# Patient Record
Sex: MALE | Race: BLACK OR AFRICAN AMERICAN
[De-identification: names, ages, dates, MRNs, and addresses within clinical notes are randomized per-mention and may not be internally consistent; named-entity substitution may affect disease eponyms.]

---

## 2017-01-12 ENCOUNTER — HOSPITAL ENCOUNTER (INPATIENT)
Dept: HOSPITAL 61 - ER | Age: 62
LOS: 2 days | Discharge: HOME | DRG: 392 | End: 2017-01-14
Attending: INTERNAL MEDICINE | Admitting: INTERNAL MEDICINE
Payer: MEDICARE

## 2017-01-12 VITALS — WEIGHT: 177 LBS | HEIGHT: 67 IN | BODY MASS INDEX: 27.78 KG/M2

## 2017-01-12 VITALS — DIASTOLIC BLOOD PRESSURE: 73 MMHG | SYSTOLIC BLOOD PRESSURE: 134 MMHG

## 2017-01-12 VITALS — SYSTOLIC BLOOD PRESSURE: 151 MMHG | DIASTOLIC BLOOD PRESSURE: 79 MMHG

## 2017-01-12 VITALS — SYSTOLIC BLOOD PRESSURE: 140 MMHG | DIASTOLIC BLOOD PRESSURE: 89 MMHG

## 2017-01-12 DIAGNOSIS — M87.9: ICD-10-CM

## 2017-01-12 DIAGNOSIS — M16.10: ICD-10-CM

## 2017-01-12 DIAGNOSIS — R07.89: ICD-10-CM

## 2017-01-12 DIAGNOSIS — E66.3: ICD-10-CM

## 2017-01-12 DIAGNOSIS — R10.30: Primary | ICD-10-CM

## 2017-01-12 DIAGNOSIS — N43.3: ICD-10-CM

## 2017-01-12 DIAGNOSIS — Z82.49: ICD-10-CM

## 2017-01-12 DIAGNOSIS — Z84.1: ICD-10-CM

## 2017-01-12 DIAGNOSIS — F12.90: ICD-10-CM

## 2017-01-12 DIAGNOSIS — G89.29: ICD-10-CM

## 2017-01-12 DIAGNOSIS — Z98.890: ICD-10-CM

## 2017-01-12 DIAGNOSIS — F17.210: ICD-10-CM

## 2017-01-12 LAB
ALBUMIN SERPL-MCNC: 3.3 G/DL (ref 3.4–5)
ALBUMIN/GLOB SERPL: 0.9 {RATIO} (ref 1–1.7)
ALP SERPL-CCNC: 76 U/L (ref 46–116)
ALT SERPL-CCNC: 18 U/L (ref 16–63)
ANION GAP SERPL CALC-SCNC: 8 MMOL/L (ref 6–14)
AST SERPL-CCNC: 12 U/L (ref 15–37)
BASOPHILS # BLD AUTO: 0 X10^3/UL (ref 0–0.2)
BASOPHILS NFR BLD: 0 % (ref 0–3)
BILIRUB SERPL-MCNC: 0.3 MG/DL (ref 0.2–1)
BUN SERPL-MCNC: 6 MG/DL (ref 8–26)
BUN/CREAT SERPL: 8 (ref 6–20)
CALCIUM SERPL-MCNC: 8.7 MG/DL (ref 8.5–10.1)
CHLORIDE SERPL-SCNC: 108 MMOL/L (ref 98–107)
CK SERPL-CCNC: 148 U/L (ref 39–308)
CKMB INDEX: 0.5 % (ref 0–4)
CKMB MASS: 0.8 NG/ML (ref 0–3.6)
CO2 SERPL-SCNC: 26 MMOL/L (ref 21–32)
CREAT SERPL-MCNC: 0.8 MG/DL (ref 0.7–1.3)
EOSINOPHIL NFR BLD: 2 % (ref 0–3)
ERYTHROCYTE [DISTWIDTH] IN BLOOD BY AUTOMATED COUNT: 13.3 % (ref 11.5–14.5)
GFR SERPLBLD BASED ON 1.73 SQ M-ARVRAT: 119.7 ML/MIN
GLOBULIN SER-MCNC: 3.7 G/DL (ref 2.2–3.8)
GLUCOSE SERPL-MCNC: 106 MG/DL (ref 70–99)
HCT VFR BLD CALC: 41.1 % (ref 39–53)
HGB BLD-MCNC: 13.7 G/DL (ref 13–17.5)
INR PPP: 1.1 (ref 0.8–1.1)
LYMPHOCYTES # BLD: 1.1 X10^3/UL (ref 1–4.8)
LYMPHOCYTES NFR BLD AUTO: 10 % (ref 24–48)
MCH RBC QN AUTO: 31 PG (ref 25–35)
MCHC RBC AUTO-ENTMCNC: 33 G/DL (ref 31–37)
MCV RBC AUTO: 93 FL (ref 79–100)
MONOCYTES NFR BLD: 6 % (ref 0–9)
NEUTROPHILS NFR BLD AUTO: 82 % (ref 31–73)
PLATELET # BLD AUTO: 228 X10^3/UL (ref 140–400)
POTASSIUM SERPL-SCNC: 3.6 MMOL/L (ref 3.5–5.1)
PROT SERPL-MCNC: 7 G/DL (ref 6.4–8.2)
PROTHROMBIN TIME: 13.2 SEC (ref 11.7–14)
RBC # BLD AUTO: 4.43 X10^6/UL (ref 4.3–5.7)
SODIUM SERPL-SCNC: 142 MMOL/L (ref 136–145)
WBC # BLD AUTO: 10.8 X10^3/UL (ref 4–11)

## 2017-01-12 PROCEDURE — 80053 COMPREHEN METABOLIC PANEL: CPT

## 2017-01-12 PROCEDURE — 83880 ASSAY OF NATRIURETIC PEPTIDE: CPT

## 2017-01-12 PROCEDURE — 85610 PROTHROMBIN TIME: CPT

## 2017-01-12 PROCEDURE — 82150 ASSAY OF AMYLASE: CPT

## 2017-01-12 PROCEDURE — 93306 TTE W/DOPPLER COMPLETE: CPT

## 2017-01-12 PROCEDURE — 36415 COLL VENOUS BLD VENIPUNCTURE: CPT

## 2017-01-12 PROCEDURE — 96374 THER/PROPH/DIAG INJ IV PUSH: CPT

## 2017-01-12 PROCEDURE — 93005 ELECTROCARDIOGRAM TRACING: CPT

## 2017-01-12 PROCEDURE — 84484 ASSAY OF TROPONIN QUANT: CPT

## 2017-01-12 PROCEDURE — 73502 X-RAY EXAM HIP UNI 2-3 VIEWS: CPT

## 2017-01-12 PROCEDURE — 76870 US EXAM SCROTUM: CPT

## 2017-01-12 PROCEDURE — 71010: CPT

## 2017-01-12 PROCEDURE — 83690 ASSAY OF LIPASE: CPT

## 2017-01-12 PROCEDURE — 82553 CREATINE MB FRACTION: CPT

## 2017-01-12 PROCEDURE — 85027 COMPLETE CBC AUTOMATED: CPT

## 2017-01-12 PROCEDURE — 72195 MRI PELVIS W/O DYE: CPT

## 2017-01-12 PROCEDURE — 80061 LIPID PANEL: CPT

## 2017-01-12 RX ADMIN — MORPHINE SULFATE PRN MG: 4 INJECTION, SOLUTION INTRAMUSCULAR; INTRAVENOUS at 15:16

## 2017-01-12 RX ADMIN — MORPHINE SULFATE PRN MG: 4 INJECTION, SOLUTION INTRAMUSCULAR; INTRAVENOUS at 18:36

## 2017-01-12 RX ADMIN — LIDOCAINE SCH PATCH: 50 PATCH CUTANEOUS at 16:33

## 2017-01-12 RX ADMIN — OXYCODONE HYDROCHLORIDE AND ACETAMINOPHEN PRN TAB: 10; 325 TABLET ORAL at 16:31

## 2017-01-12 RX ADMIN — TRAZODONE HYDROCHLORIDE SCH MG: 100 TABLET ORAL at 22:16

## 2017-01-12 NOTE — EKG
[facility]

              [address] [place] [address]

Test Date:    2017               Test Time:    11:02:52

Pat Name:     OSMIN BARRAZA           Department:   

Patient ID:   [id number]           Room:          

Gender:       M                        Technician:   

:          1955               Requested By: RICHARD PAK

Order Number: [id number]            Reading MD:   Terrence Cooper

                                 Measurements

Intervals                              Axis          

Rate:         70                       P:            38

FL:           168                      QRS:          84

QRSD:         106                      T:            34

QT:           366                                    

QTc:          398                                    

                           Interpretive Statements

SINUS RHYTHM



Electronically Signed On 2017 10:13:54 CST by Terrence Cooper

## 2017-01-12 NOTE — RAD
Exam:  AP portable chest. 



History:  Chest pain beginning this morning, smoking history.



Comparison:  6/20/2014.



Findings:  The heart and mediastinal structures are within normal limits for

size.  Lungs are without infiltrate.  No pneumothorax or pleural effusion is

appreciated.  Old granulomatous disease of the chest is noted.



Impression:  

1.  No acute cardiopulmonary process.

## 2017-01-12 NOTE — ACF
Admission Forms Criteria


                            CARDIOLOGY GRG





Clinical Indications for Admission to Inpatient Care


    


                                                                               (

Place 'X' for any and all applicable criteria): 





Hospital admission is needed for appropriate care of the patient because of ANY 

ONE of the following (1): 





[ ] I.    Hemodynamic instability as indicated by ALL of the following (1)(2)(3)

(4)(5)


         [ ]a)   Vital signs or other findings not as expected for chronic 

patient condition or baseline


         [ ]b)   Instability indicated by ANY ONE of the following:


                  [ ]i)     Hypotension


                  [ ]ii)    Symptomatic Tachycardia unresponsive to treatment (

e.g., analgesia, fluids, sedation as indicated)


                  [ ]iii)   Inadequate perfusion indicated by ANY ONE of the 

following:


                            [ ] 1)   Lactic acidosis (> 2 mmol/L)


                            [ ] 2)   New abnormal capillary refill (> 3 seconds)


                            [ ] 3)   Reduced urine output


                            [ ] 4)   New altered mental status


                  [ ]iv)   Orthostatic vital sign changes unresponsive to 

treatment (e.g., fluids)              


                  [ ]v)    IV inotropic or vasopressor medication required to 

maintain adequate blood pressure or perfusion


[ ] II.  Severe heart failure as indicated by ANY ONE of the following(17)(18)


         [ ]a)  Respiratory distress        


         [ ]b)  Hypotension


         [ ]c)  Anasarca (refractory to outpatient therapy) 


         [ ]d)  Cardiac arrhythmias of immediate concern 


         [ ]e)  Myocardial ischemia


[ ] III. Cardiac arrhythmias or findings of immediate concern indicated by ANY 

ONE of the following (19)(20):


         [ ] a)  Heart rhythms that are inherently dangerous or unstable 

indicated by ANY ONE of the following (21)(22)(23):


                 [ ] i)    Resuscitated ventricular fibrillation or cardiac 

arrest


                 [ ] ii)   Ventricular escape rhythm


                 [ ] iii)  Sustained ventricular tachycardia (30 seconds or 

more of ventricular rhythm at greater than 100 beats per minute)


                 [ ] iv)  Nonsustained ventricular tachycardia and ANY ONE of 

the following:


                          [ ] 1)    Suspected cardiac ischemia as cause or 

consequence of ventricular tachycardia    


                          [ ] 2)    In setting of acute myocarditis         


         [ ] b)  Unstable cardiac conduction defects indicated by ANY ONE of 

the following(23)(24)(25)


                  [ ] i)    Type II second-degree atrioventricular block    


                  [ ]ii)    Third-degree atrioventricular block                


                  [ ]iii)    New-onset left bundle branch block with suspected 

myocardial ischemia


         [ ]c)   Any heart rhythm and ANY ONE of the following (21)(22)(26)(27) 

(28)


                  [ ] i)    Continuous long-term ECG monitoring needed (e.g., 

initiation of drug requiring monitoring for more than 24 hours)


                  [ ] ii)   Patient has automatic implanted cardioverter 

defibrillator that is repeatedly firing, malfunctioning, or in need of 

immediate 


                            adjustment of settings beyond the scope of 

ambulatory or observation care


        [ ]d)    Heart rhythms of concern due to ANY ONE of the following:


                  [ ] i)    Hypotension


                  [ ] ii)    Respiratory distress


                  [ ] iii)   Association with other significant symptoms (e.g., 

bradycardia with syncope or ongoing dizziness, supraventricular 


                            tachycardia with chest pain (14)(15)(17)


[ ] IV.   Monitoring for cardiac contusion beyond the scope of observation care 

needed [A](30)(31)(32)


[ ] V.    Surgical or device complication (e.g., valve replacement complication

, pacemaker dysfunction) (35)(41)(44)(45)(46)


[ ] VI.   Inpatient palliative care needed. [B](49) Also use Inpatient 

Palliative Care Criteria


[ ] VII.  Nonbacterial thrombotic (marantic) endocarditis (36)(43)(47)(48)


[X] VIII. Cardiology condition, symptom, or finding for which emergency and 

observation care has failed or are not considered appropriate.


[ ] IX.   Acute valvular disease requiring inpatient as indicated by ANY ONE of 

the following (41)


          [ ]a)   Acute valvular regurgitation (42)


          [ ]b)   Noninfectious valvulitis (43)


          [ ]c)   Obstructive valve thrombosis 


          [ ]d)   Paravalvular leak


          [ ]e)   Other significant valvular disorder remaining after emergency 

or observation level of care (as appropriate)


[ ]X.    Pericardial disease requiring inpatient treatment as indicated by ANY 

ONE of the following (33)(34)(35)(36)(37)           


          [ ]a)   Suspected tamponade (38)(39)(40)


          [ ]b)   Hemopericardium


          [ ]c)   Other significant pericardial disorder remaining after 

emergency or observation level of care (as appropriate)


[ ] XI.  Cardiac ischemia beyond scope of emergency and observation care. 


[ ] XII. Hypertension requiring inpatient treatment as indicated by ANY ONE of 

the following (6)(7)(8)


         [ ]a)    SBP greater than 220 mm Hg or DBP greater than 120 mmHg 

despite treatment


         [ ]b)    SBP greater than 140 mm Hg or DBP greater than 100 mm Hg with 

evidence of acute end organ damage as indicated


                   by ANY ONE of the following


                   [ ] i)      Encephalopathy


                   [ ] ii)     Acute renal failure as indicated by new onset of 

ANY ONE of the following (9)(10)(11)(12)(13)


                               [ ]1)  3-fold rise in serum creatinine from 

baseline


                               [ ]2)  Serum creatinine greater than 4 mg/dL (

354 micromoles/L) with acute rise greater than 0.5 mg/dL (44.2 micromoles/L)


                               [ ]3)  Reduction of more than 75% in estimated 

glomerular filtration rate from baseline 


                               [ ]4)  Estimated glomerular filtration rate less 

than 35 mL/min/1.73m2 (0.59 mL/sec/1.73m2) in child up to 18 years of age


                               [ ]5)  Cessation of urine output indicated by ALL

 of the following


                                       [ ]A.   Adequate volume status


                                       [ ]B.   Inadequate urine output as 

indicated by ANY ONE of the following       


                                                [ ]a.   Urine output less than 

0.3 mL/kg/hr for 24 hours


                                                [ ]b.   Anuria (urine output 

less than 0.1 mL/kg/hr) for 12 hours 


                  [ ] iii)      Aortic dissection


                  [ ] iv)      Myocardial Ischemia


                  [ ] v)       Left ventricular heart failure 


                  [ ]vi)       Retinal Hemorrhage


                  [ ]vii)      Other significant finding


         [ ]c)   Hypertension in child requiring inpatient treatment as 

indicated by ALL of the following(14)(15)(16)


                  [ ] i)        Outpatient treatment not effective, not 

available, or not appropriate               


                  [ ]ii)        SBP or DBP greater than 95th percentile for age


                  [ ]iii)       Evidence of acute end organ damage as indicated 

by ANY ONE of the following 


                               [ ]1)     Altered mental status


                               [ ]2)    Acute renal failure as indicated by new 

onset of ANY ONE of the following(9)(10)(11)(12)(13)


                                         [ ]A.    3-fold rise in serum 

creatinine from baseline


                                         [ ]B.    Serum creatinine greater than 

4 mg/dL (354 micromoles/L) with acute rise greater than 0.5 mg/dL (44.2 

micromoles/L)


                                         [ ]C.    Reduction of more than 75% in 

estimated glomerular filtration rate from baseline


                                         [ ]D.    Estimated glomerular 

filtration rate less than 35 mL/min/1.73m2 (0.59 mL/sec/1.73m2) in child up to 

18 years of age 


                                         [ ]E.    Cessation of urine output 

indicated by ALL of the following 


                                                   [ ]a.  Adequate volume status


                                                   [ ]b.  Inadequate urine 

output as indicated by ANY ONE of the following 


                                                           [ ]i)    Urine 

output less than 0.3 mL/kg/hr for 24 hours


                                                           [ ]ii)   Anuria (

urine output less than 0.1 mL/kg/hr) for 12 hours                              

  


                                                    [ ]3)  Severe headache


                              [ ]4)  Visual disturbance 


                              [ ]5)  Retinal hemorrhage


                              [ ]6)  Other significant finding


[ ]XIII.   Complications of transplanted heart indicated by ANY ONE of the 

following(61):


           [ ]a)  Acute graft rejection requiring inpatient management (eg, 

intravenous immunosuppression)(62)(63)


           [ ]b)  Acute graft heart failure indicated by ANY ONE of the 

following(64):


                   [ ]i)   Hemodynamic instability


                   [ ]ii)  Cardiac arrhythmias of immediate concern


                   [ ]iii) Pulmonary edema that is very severe (eg, mechanical 

ventilation needed,


                          imminent or likely, need for 100% oxygen to keep 

oxygen saturation above 90%)


                   [ ]iv) Pulmonary edema that is persistent as indicated by ALL

 of the following:


                          [ ]1)   New need for oxygen therapy to keep oxygen 

saturation above 90% (or increased FiO2 need from baseline)


                          [ ]2)   Has not improved sufficiently with emergency 

department or observation


                                   care IV diuretics or other heart failure 

treatments[E]


                   [ ]v)   Altered mental status that is severe or persistent


                   [ ]vi)   Increased creatinine (new on laboratory test) with 

reduction of more than 50% in


                            estimated glomerular filtration rate from baseline


                   [ ]vii)  Progressively (ongoing) rising creatinine (known 

from past laboratory test) with


                            reduction of more than 25% in estimated glomerular 

filtration rate from baseline


                   [ ]viii) Acute renal failure


                   [ ]ix)  Acute peripheral ischemia (eg, examination shows 

pulseless, cool, mottled, or cyanotic extremity)


                   [ ]x)   Pulmonary artery catheter monitoring needed


                   [ ]xi)  Other sign or symptom of heart failure requiring 

inpatient treatment (ie, too severe or


                            not responsive to outpatient and observation care 

treatment)


        [ ]c)    Infection requiring inpatient management (eg, Hemodynamic 

instability, need for intravenous antimicrobial treatment)(66)(67)(68)(69)(70)


        [ ]d)   Cardiac allograft vasculopathy requiring inpatient management (

eg evidence of cardiac ischemia)(71)


        [ ]e)   Other complication of transplanted heart (eg, stroke, severe 

pulmonary hypertension, severe valvular 


                 dysfunction) requiring inpatient management(72)








The original [facility] content created by [facility] has been revised. 


The portions of the content which have been revised are identified through the 

use of italic text or in bold, and [facility] 


has neither reviewed nor approved the modified material. All other unmodified 

content is copyright  [facility].





Please see references footnoted in the original [facility] edition 

2016


Admission Criteria Met?:  Yes








MARELY MORA Jan 12, 2017 11:28

## 2017-01-12 NOTE — PDOC2
ASHLEY ROSARIO APRN 1/12/17 1530:


CARDIAC CONSULT


DATE OF CONSULT


Date of Consult


DATE: 1/12/17 


TIME: 15:05





REASON FOR CONSULT


Reason for Consult:


Chest pain





REFERRING PHYSICIAN


Referring Physician:


Félix





SOURCE


Source:  Chart review, Patient





HISTORY OF PRESENT ILLNESS


HISTORY OF PRESENT ILLNESS


This is a 60 yo AA male admitted for multiple complaints.  All of which started 

about 2 days ago.  Reports right neck pain and anterior chest pain sharp in 

consistency and duplicated easily with palpation. Reports that he has chronic 

pain and takes percocet due to chronic back pain.  He just had his 2nd lumbar 

surgery notable for lumbar fusion about 2 months ago which he did well.  He 

actually fell about a month ago and was reevaluated by his neurosurgery and was 

ok accdg to him.  Reports that his wife just had stroke and also he has a son 

that has CP.  As far as activity tolerance he has been doing well but exertion 

wise it has significantly increased as far as lifting with his son and wife. 

Also he has been lifting laundry. Concurrent with his chest pain he also 

abdominal pain.  He has diffuse tenderness throughout but mainly localized to 

his LLQ with mild palpation.  This also started 2 days ago and may have had 

some chills but no documented fever.  Verbalized nausea and vomiting but no 

blood.  In the last 24 hours he has vomited twice and had 3 hard stools and 

last one was runny.  Denies any blood or dark stools.  Denies any SOA, 

palpitations, had some dizziness but at the same time he has not drank much the 

whole day due to his abd pain and nausea.  Denies any CAD, VTE, falls, syncope, 

CVA, HA, or any recent injury. Denies chronic NSAID use and only takes percocet.





PAST MEDICAL HISTORY


Cardiovascular:  No pertinent hx


Pulmonary:  No pertinent hx


CENTRAL NERVOUS SYSTEM:  Other (No pertinent history)


GI:  Diverticulosis, Other (inguinal hernia)


Heme/Onc:  No pertinent hx


Hepatobiliary:  No pertinent hx


Psych:  No pertinent hx


Musculoskeletal:   low back pain, Osteoarthritis


Rheumatologic:  No pertinent hx


Infectious disease:  No pertinent hx


ENT:  No pertinent hx


Renal/:  No pertinent hx


Endocrine:  No pertinent hx


Dermatology:  No pertinent hx





PAST SURGICAL HISTORY


Past Surgical History:  Hernia Repair (inguinal bilateral ), Other (lumbar 

surgery x2 with the last one with fusion 11/2016)





FAMILY HISTORY


Family History:  Kidney Disease (mother)





SOCIAL HISTORY


Smoke:  <1 pack per day (cigars in the last 4 yrs otherwise 30 pk yr of regular 

tobacco)


ALCOHOL:  occassional


Drugs:  Marijuana


Lives:  with Family





CURRENT MEDICATIONS


CURRENT MEDICATIONS





Current Medications








 Medications


  (Trade)  Dose


 Ordered  Sig/Andrew


 Route


 PRN Reason  Start Time


 Stop Time Status Last Admin


Dose Admin


 


 Aspirin


  (Franklin Aspirin)  325 mg  1X  ONCE


 PO


   1/12/17 11:30


 1/12/17 11:31 DC 1/12/17 11:45


 


 


 Morphine Sulfate  5 mg  1X  ONCE


 IV


   1/12/17 11:30


 1/12/17 11:31 DC 1/12/17 11:46


 











ALLERGIES


ALLERGIES:  


Coded Allergies:  


     No Known Drug Allergies (Unverified , 6/20/14)





ROS


Review of System


14 point ROS evaluated with pertinent positives noted per HPI





PHYSICAL EXAM


General:  Alert, Oriented X3, Cooperative, mild distress


HEENT:  Atraumatic, Mucous membr. moist/pink


Lungs:  Clear to auscultation, Normal air movement


Abdomen:  Soft, Other (hyperactive bowel sounds to all quads; Diffuse abd 

tenderness localazing with more intensity to LLQ. )


Extremities:  No cyanosis, No edema


Skin:  No breakdown, No significant lesion


Neuro:  Normal speech, Sensation intact


Psych/Mental Status:  Mental status NL, Mood NL


MUSCULOSKELETAL:  Osteoarthritic changes both hands





VITALS


VITALS





 Vital Signs








  Date Time  Temp Pulse Resp B/P Pulse Ox O2 Delivery O2 Flow Rate FiO2


 


1/12/17 10:58 97.8 72 22 169/98 98 Room Air  





 97.8       











LABS


Lab:





Laboratory Tests








Test


  1/12/17


11:12


 


White Blood Count


  10.8x10^3/uL


(4.0-11.0)


 


Red Blood Count


  4.43x10^6/uL


(4.30-5.70)


 


Hemoglobin


  13.7g/dL


(13.0-17.5)


 


Hematocrit


  41.1%


(39.0-53.0)


 


Mean Corpuscular Volume 93fL () 


 


Mean Corpuscular Hemoglobin 31pg (25-35) 


 


Mean Corpuscular Hemoglobin


Concent 33g/dL (31-37) 


 


 


Red Cell Distribution Width


  13.3%


(11.5-14.5)


 


Platelet Count


  228x10^3/uL


(140-400)


 


Neutrophils (%) (Auto) 82% (31-73) 


 


Lymphocytes (%) (Auto) 10% (24-48) 


 


Monocytes (%) (Auto) 6% (0-9) 


 


Eosinophils (%) (Auto) 2% (0-3) 


 


Basophils (%) (Auto) 0% (0-3) 


 


Neutrophils # (Auto)


  8.8x10^3uL


(1.8-7.7)


 


Lymphocytes # (Auto)


  1.1x10^3/uL


(1.0-4.8)


 


Monocytes # (Auto)


  0.6x10^3/uL


(0.0-1.1)


 


Eosinophils # (Auto)


  0.2x10^3/uL


(0.0-0.7)


 


Basophils # (Auto)


  0.0x10^3/uL


(0.0-0.2)


 


Prothrombin Time


  13.2SEC


(11.7-14.0)


 


Prothromb Time International


Ratio 1.1 (0.8-1.1) 


 


 


Sodium Level


  142mmol/L


(136-145)


 


Potassium Level


  3.6mmol/L


(3.5-5.1)


 


Chloride Level


  108mmol/L


()


 


Carbon Dioxide Level


  26mmol/L


(21-32)


 


Anion Gap 8 (6-14) 


 


Blood Urea Nitrogen 6mg/dL (8-26) 


 


Creatinine


  0.8mg/dL


(0.7-1.3)


 


Estimated GFR


(Cockcroft-Gault) 119.7 


 


 


BUN/Creatinine Ratio 8 (6-20) 


 


Glucose Level


  106mg/dL


(70-99)


 


Calcium Level


  8.7mg/dL


(8.5-10.1)


 


Total Bilirubin


  0.3mg/dL


(0.2-1.0)


 


Aspartate Amino Transf


(AST/SGOT) 12U/L (15-37) 


 


 


Alanine Aminotransferase


(ALT/SGPT) 18U/L (16-63) 


 


 


Alkaline Phosphatase 76U/L () 


 


Creatine Kinase


  148U/L


()


 


Creatine Kinase MB (Mass)


  0.8ng/mL


(0.0-3.6)


 


Creatine Kinase MB Relative


Index 0.5% (0-4) 


 


 


Troponin I Quantitative


  < 0.017ng/mL


(0.000-0.055)


 


NT-Pro-B-Type Natriuretic


Peptide 29pg/mL


(0-124)


 


Total Protein


  7.0g/dL


(6.4-8.2)


 


Albumin


  3.3g/dL


(3.4-5.0)


 


Albumin/Globulin Ratio 0.9 (1.0-1.7) 


 


Lipase


  457U/L


()











ASSESSMENT/PLAN


ASSESSMENT/PLAN


1. Atypical CP: doubt ACS. Initial troponin normal, continue to trend.  EKG SR, 

no acute changes.  Suspect MSK with well documented increased lifting with 

chronic back pain.  No prior cardiac workup.  TTE to note baseline.  If no 

significant changes then no further cardiac workup. 





2. Abdominal pain.  localized LLQ pain, likely inguinal lymphadenopathy, unable 

to palpate well due to significant tenderness.  Possible STI. UA pending. Sono 

revealed possible epididymitis. WBC normal but hx of diverticulosis. Positive 

for loose stools and vomiting.  Recommend CT abd/pevis and will defer workup to 

PCP





3. Chronic back pain: successful lumbar fusion about 2 months ago at .  





4. HTN? elevated likely from pain.  Will trend and evaluate for need of 

antiHTN. 





5. Tobaccoism: 30 pk yr regular tobacco then quit and switch to cigars for 4 

yrs now. 





6. Substance abuse: reports uses it for pain.


Problems:  





FABIO CATALAN MD 1/13/17 1640:


CARDIAC CONSULT


ALLERGIES


ALLERGIES:  


Coded Allergies:  


     No Known Drug Allergies (Unverified , 6/20/14)





ASSESSMENT/PLAN


ASSESSMENT/PLAN


Patient seen and examined.  Agree with above nurse practitioner note.


Patient seen on 01/13/2017.  61-year-old male presenting with noncardiac chest 

pain.  Echocardiogram is unremarkable.  Blood pressure likely elevated from his 

pain.  Discussed tobacco cessation.  Continue supportive care from a cardiac 

perspective.  No further testing necessary at this time.  Thank you for this 

consultation.  We will follow along peripherally.


Problems:  








ASHLEY ROSARIO Jan 12, 2017 15:30


FABIO CATALAN MD Jan 13, 2017 16:40

## 2017-01-12 NOTE — RAD
EXAM: Left hip, 2 views.



HISTORY: Pain.



COMPARISON: 5/8/2013.



FINDINGS: Frontal and frog-leg views of the left hip are obtained. There is

curvilinear sclerosis within the superior left greater than right femoral

heads, most images of of avascular necrosis. No cortical collapse is seen.

There is fusion and instrumentation at the lumbosacral junction. A left S1

screw is not attached to a vertical allan.



IMPRESSION: 

1. 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

2. Instrumented fusion at the lumbosacral junction. A left S1 screw does is

not attached to a vertical allan.

## 2017-01-12 NOTE — PDOC1
History and Physical


Date of Admission


Date of Admission


DATE: 1/12/17 


TIME: 14:55





Identification/Chief Complaint


Chief Complaint


groin pain, chest pain, neck pain





History of Present Illness


History of Present Illness


61 y.o AA male who just had laminectomy at  not long ago comes to ER 

complaining of chest pain, neck pain and left groin pain. NO known CAD, HTN or 

DM, 1 tobacco lasts him 2 days, occasional drinker, He has some chronic opiates 

at home, those are his only meds. CArdiac work up at er level is neg, MACKAY 

scrotum was done bec of groin pain which was unimpressive,


On palpation, left hip - tender to touch, no dx of OA


Pt admitted for ACS r.o





Past Medical History


Musculoskeletal:   low back pain





Past Surgical History


Past Surgical History:  Other (laminectomy )





Family History


Family History:  Heart Disease, High Cholestrol, Hypertension





Social History


Smoke:  <1 pack per day


ALCOHOL:  occassional


Drugs:  None





Current Problem List


Problem List


 Problems


Medical Problems:


(1) Chest pain


Status: Acute  





(2) Chest pain of uncertain etiology


Status: Acute  





(3) Hydrocele in adult


Status: Acute  





(4) Neck pain, acute


Status: Acute  








Problems:  





Current Medications


Current Medications





 Current Medications


Aspirin (Fraknlin Aspirin) 325 mg 1X  ONCE PO  Last administered on 1/12/17at 11:45

;  Start 1/12/17 at 11:30;  Stop 1/12/17 at 11:31;  Status DC


Morphine Sulfate 5 mg 1X  ONCE IV  Last administered on 1/12/17at 11:46;  Start 

1/12/17 at 11:30;  Stop 1/12/17 at 11:31;  Status DC


Ondansetron HCl (Zofran) 4 mg PRN Q8HRS  PRN IV NAUSEA/VOMITING;  Start 1/12/17 

at 14:15;  Stop 1/13/17 at 14:14


Morphine Sulfate 4 mg PRN Q2HR  PRN IV PAIN;  Start 1/12/17 at 14:15;  Stop 1/13 /17 at 14:14


Acetaminophen (Tylenol) 650 mg PRN Q4HRS  PRN PO FEVER;  Start 1/12/17 at 14:15

;  Stop 1/13/17 at 14:14





Active Scripts


Active


Hydrocodone-Apap 5-325  ** (Hydrocodone Bit/Acetaminophen) 1 Each Tablet 1 Tab 

PO PRN Q6HRS PRN


Zofran Odt (Ondansetron) 4 Mg Tab.rapdis 1 Tab SL Q8HRS


Reported


Percocet  Mg Tablet (Oxycodone/Acetaminophen) 1 Each Tablet 1 Each PO 


Trazodone Hcl 100 Mg Tablet 100 Mg PO 


Diazepam 10 Mg Tablet 10 Mg PO





Allergies


Allergies:  


Coded Allergies:  


     No Known Drug Allergies (Unverified , 6/20/14)





ROS


Review of System


all else in HPI,none rest ROS





Physical Exam


General:  Alert, Oriented X3, Cooperative, No acute distress


HEENT:  Atraumatic, EOMI


Lungs:  Clear to auscultation, Normal air movement


Heart:  S1S2, RRR, no thrills, no rubs, no gallops, no murmurs


Cardiovascular:  S1


Abdomen:  Normal bowel sounds, Soft, No tenderness, No hepatosplenomegaly, No 

masses


Male Genitals Exam:  normal genitalia, normal prostate


PELVIC:  Other (tender to palpation left hip )


Extremities:  No clubbing, No cyanosis, No edema, Normal pulses, No tenderness/

swelling


Skin:  No rashes, No breakdown, No significant lesion


Neuro:  Normal gait, Normal speech, Strength at 5/5 X4 ext, Normal tone, 

Sensation intact, Cranial nerves 3-12 NL, Reflexes 2+





Vitals


Vitals





 Vital Signs








  Date Time  Temp Pulse Resp B/P Pulse Ox O2 Delivery O2 Flow Rate FiO2


 


1/12/17 10:58 97.8 72 22 169/98 98 Room Air  





 97.8       











Labs


Labs





Laboratory Tests








Test


  1/12/17


11:12


 


White Blood Count


  10.8x10^3/uL


(4.0-11.0)


 


Red Blood Count


  4.43x10^6/uL


(4.30-5.70)


 


Hemoglobin


  13.7g/dL


(13.0-17.5)


 


Hematocrit


  41.1%


(39.0-53.0)


 


Mean Corpuscular Volume 93fL () 


 


Mean Corpuscular Hemoglobin 31pg (25-35) 


 


Mean Corpuscular Hemoglobin


Concent 33g/dL (31-37) 


 


 


Red Cell Distribution Width


  13.3%


(11.5-14.5)


 


Platelet Count


  228x10^3/uL


(140-400)


 


Neutrophils (%) (Auto) 82% (31-73) 


 


Lymphocytes (%) (Auto) 10% (24-48) 


 


Monocytes (%) (Auto) 6% (0-9) 


 


Eosinophils (%) (Auto) 2% (0-3) 


 


Basophils (%) (Auto) 0% (0-3) 


 


Neutrophils # (Auto)


  8.8x10^3uL


(1.8-7.7)


 


Lymphocytes # (Auto)


  1.1x10^3/uL


(1.0-4.8)


 


Monocytes # (Auto)


  0.6x10^3/uL


(0.0-1.1)


 


Eosinophils # (Auto)


  0.2x10^3/uL


(0.0-0.7)


 


Basophils # (Auto)


  0.0x10^3/uL


(0.0-0.2)


 


Prothrombin Time


  13.2SEC


(11.7-14.0)


 


Prothromb Time International


Ratio 1.1 (0.8-1.1) 


 


 


Sodium Level


  142mmol/L


(136-145)


 


Potassium Level


  3.6mmol/L


(3.5-5.1)


 


Chloride Level


  108mmol/L


()


 


Carbon Dioxide Level


  26mmol/L


(21-32)


 


Anion Gap 8 (6-14) 


 


Blood Urea Nitrogen 6mg/dL (8-26) 


 


Creatinine


  0.8mg/dL


(0.7-1.3)


 


Estimated GFR


(Cockcroft-Gault) 119.7 


 


 


BUN/Creatinine Ratio 8 (6-20) 


 


Glucose Level


  106mg/dL


(70-99)


 


Calcium Level


  8.7mg/dL


(8.5-10.1)


 


Total Bilirubin


  0.3mg/dL


(0.2-1.0)


 


Aspartate Amino Transf


(AST/SGOT) 12U/L (15-37) 


 


 


Alanine Aminotransferase


(ALT/SGPT) 18U/L (16-63) 


 


 


Alkaline Phosphatase 76U/L () 


 


Creatine Kinase


  148U/L


()


 


Creatine Kinase MB (Mass)


  0.8ng/mL


(0.0-3.6)


 


Creatine Kinase MB Relative


Index 0.5% (0-4) 


 


 


Troponin I Quantitative


  < 0.017ng/mL


(0.000-0.055)


 


NT-Pro-B-Type Natriuretic


Peptide 29pg/mL


(0-124)


 


Total Protein


  7.0g/dL


(6.4-8.2)


 


Albumin


  3.3g/dL


(3.4-5.0)


 


Albumin/Globulin Ratio 0.9 (1.0-1.7) 


 


Lipase


  457U/L


()








Laboratory Tests








Test


  1/12/17


11:12


 


White Blood Count


  10.8x10^3/uL


(4.0-11.0)


 


Red Blood Count


  4.43x10^6/uL


(4.30-5.70)


 


Hemoglobin


  13.7g/dL


(13.0-17.5)


 


Hematocrit


  41.1%


(39.0-53.0)


 


Mean Corpuscular Volume 93fL () 


 


Mean Corpuscular Hemoglobin 31pg (25-35) 


 


Mean Corpuscular Hemoglobin


Concent 33g/dL (31-37) 


 


 


Red Cell Distribution Width


  13.3%


(11.5-14.5)


 


Platelet Count


  228x10^3/uL


(140-400)


 


Neutrophils (%) (Auto) 82% (31-73) 


 


Lymphocytes (%) (Auto) 10% (24-48) 


 


Monocytes (%) (Auto) 6% (0-9) 


 


Eosinophils (%) (Auto) 2% (0-3) 


 


Basophils (%) (Auto) 0% (0-3) 


 


Neutrophils # (Auto)


  8.8x10^3uL


(1.8-7.7)


 


Lymphocytes # (Auto)


  1.1x10^3/uL


(1.0-4.8)


 


Monocytes # (Auto)


  0.6x10^3/uL


(0.0-1.1)


 


Eosinophils # (Auto)


  0.2x10^3/uL


(0.0-0.7)


 


Basophils # (Auto)


  0.0x10^3/uL


(0.0-0.2)


 


Prothrombin Time


  13.2SEC


(11.7-14.0)


 


Prothromb Time International


Ratio 1.1 (0.8-1.1) 


 


 


Sodium Level


  142mmol/L


(136-145)


 


Potassium Level


  3.6mmol/L


(3.5-5.1)


 


Chloride Level


  108mmol/L


()


 


Carbon Dioxide Level


  26mmol/L


(21-32)


 


Anion Gap 8 (6-14) 


 


Blood Urea Nitrogen 6mg/dL (8-26) 


 


Creatinine


  0.8mg/dL


(0.7-1.3)


 


Estimated GFR


(Cockcroft-Gault) 119.7 


 


 


BUN/Creatinine Ratio 8 (6-20) 


 


Glucose Level


  106mg/dL


(70-99)


 


Calcium Level


  8.7mg/dL


(8.5-10.1)


 


Total Bilirubin


  0.3mg/dL


(0.2-1.0)


 


Aspartate Amino Transf


(AST/SGOT) 12U/L (15-37) 


 


 


Alanine Aminotransferase


(ALT/SGPT) 18U/L (16-63) 


 


 


Alkaline Phosphatase 76U/L () 


 


Creatine Kinase


  148U/L


()


 


Creatine Kinase MB (Mass)


  0.8ng/mL


(0.0-3.6)


 


Creatine Kinase MB Relative


Index 0.5% (0-4) 


 


 


Troponin I Quantitative


  < 0.017ng/mL


(0.000-0.055)


 


NT-Pro-B-Type Natriuretic


Peptide 29pg/mL


(0-124)


 


Total Protein


  7.0g/dL


(6.4-8.2)


 


Albumin


  3.3g/dL


(3.4-5.0)


 


Albumin/Globulin Ratio 0.9 (1.0-1.7) 


 


Lipase


  457U/L


()











VTE Prophylaxis Ordered


VTE Prophylaxis Devices:  Yes


VTE Pharmacological Prophylaxi:  Yes





Assessment/Plan


Assessment/Plan


1, Atypical chest pain in an adult at rest - unlikely ACS , but admitted for 

rule out


2. SMoker


3. Left hip pain and groin pain - most of the times hip OA manifests as groin 

pain, will check basic xray, trial lidoderm, PT/OT


4  overweight


5. Lumbago with recent laminectomy





PLAN


Cycle CE


Consult cards


Resume home meds. 


PT/O


Check hip xray


Trial lidoderm patch or capsaicin cream


Seen at [facility] MD Jan 12, 2017 15:01

## 2017-01-12 NOTE — PHYS DOC
Past Medical History


Past Medical History:  Other


Additional Past Medical Histor:  chronic back pain


Past Surgical History:  Lumbar Laminectomy, Other


Additional Past Surgical Histo:  back surgery- hardware, inguinal hernia


Alcohol Use:  Rarely


Drug Use:  Marijuana





Adult General


Chief Complaint


Chief Complaint:  Neck Pain





HPI


HPI


Patient is a 61  year old male with history of lumbar laminectomy done in 

October 2016 presents today with an 8 out of 10 right lateral neck pain, 8/10 

sharp bilateral chest pain, and left groin pain  that began yesterday around 8 

PM when he went to bed. Patient denies any known injury. He states his pain is 

worse on the neck when he moves his neck side-to-side. Patient denies his chest 

pain radiating to or from the neck. Patient denies any shortness of breath. 

Patient states he has history of left inguinal hernia which has been repaired 

twice before. Patient states he has no PCP. He states he follows up with a 

neurosurgeon at [facility] that did his back. He states he is currently on 

oxycodone, trazodone, and baclofen, which she took last night. He states his 

pain to the neck chest and groin has been exacerbated by lifting laundry as 

well as taking care of his son with cerebral palsy.


Patient has been very insistent on getting pain medicine.





Review of Systems


Review of Systems





Constitutional: Denies fever or chills []


Eyes: Denies change in visual acuity, redness, or eye pain []


HENT: Denies nasal congestion or sore throat []


Respiratory: Denies cough or shortness of breath []


Cardiovascular: Bilateral chest pain


GI: Denies abdominal pain, nausea, vomiting, bloody stools or diarrhea []


: Left groin pain


Musculoskeletal: Right lateral neck pain


Integument: Denies rash or skin lesions []


Neurologic: Denies headache, focal weakness or sensory changes []


Endocrine: Denies polyuria or polydipsia []





Current Medications


Current Medications





 Current Medications








 Medications


  (Trade)  Dose


 Ordered  Sig/Andrew  Start Time


 Stop Time Status Last Admin


Dose Admin


 


 Aspirin


  (Franklin Aspirin)  325 mg  1X  ONCE  1/12/17 11:30


 1/12/17 11:31 DC 1/12/17 11:45


325 MG


 


 Morphine Sulfate  5 mg  1X  ONCE  1/12/17 11:30


 1/12/17 11:31 DC 1/12/17 11:46


5 MG











Allergies


Allergies





 Allergies








Coded Allergies Type Severity Reaction Last Updated Verified


 


  No Known Drug Allergies    6/20/14 No











Physical Exam


Physical Exam





Constitutional: Well developed, well nourished, no acute distress, non-toxic 

appearance. []


HENT: Normocephalic, atraumatic, bilateral external ears normal, oropharynx 

moist, no oral exudates, nose normal. []


Eyes: PERRLA, EOMI, conjunctiva normal, no discharge. [] 


Neck: Normal range of motion, paraspinal muscle tenderness on palpation of the 

right lateral cervical spine, no midline tenderness to the cervical spine, 

supple, no stridor. [] 


Cardiovascular:Heart rate regular rhythm, no murmur []


Lungs & Thorax:  Bilateral breath sounds clear to auscultation []


Abdomen: Bowel sounds normal, soft, no tenderness, no masses, no pulsatile 

masses. [] 


Male  exam: Groin pain difficult because patient will not get up and stand 

for exam. No obvious abnormalities noted on physical assessment of the groin.


Skin: Warm, dry, no erythema, no rash. [] 


Back: No tenderness, no CVA tenderness. [] 


Extremities: No tenderness, no cyanosis, no clubbing, ROM intact, no edema. [] 


Neurologic: Alert and oriented X 3, normal motor function, normal sensory 

function, no focal deficits noted. []


Psychologic: Affect normal, judgement normal, mood normal. []





Current Patient Data


Vital Signs





 Vital Signs








  Date Time  Temp Pulse Resp B/P Pulse Ox O2 Delivery O2 Flow Rate FiO2


 


1/12/17 10:58 97.8 72 22 169/98 98 Room Air  





 97.8       








Lab Values





 Laboratory Tests








Test


  1/12/17


11:12


 


White Blood Count


  10.8x10^3/uL


(4.0-11.0)


 


Red Blood Count


  4.43x10^6/uL


(4.30-5.70)


 


Hemoglobin


  13.7g/dL


(13.0-17.5)


 


Hematocrit


  41.1%


(39.0-53.0)


 


Mean Corpuscular Volume 93fL ()  


 


Mean Corpuscular Hemoglobin 31pg (25-35)  


 


Mean Corpuscular Hemoglobin


Concent 33g/dL (31-37)


 


 


Red Cell Distribution Width


  13.3%


(11.5-14.5)


 


Platelet Count


  228x10^3/uL


(140-400)


 


Neutrophils (%) (Auto) 82% (31-73)  H


 


Lymphocytes (%) (Auto) 10% (24-48)  L


 


Monocytes (%) (Auto) 6% (0-9)  


 


Eosinophils (%) (Auto) 2% (0-3)  


 


Basophils (%) (Auto) 0% (0-3)  


 


Neutrophils # (Auto)


  8.8x10^3uL


(1.8-7.7)  H


 


Lymphocytes # (Auto)


  1.1x10^3/uL


(1.0-4.8)


 


Monocytes # (Auto)


  0.6x10^3/uL


(0.0-1.1)


 


Eosinophils # (Auto)


  0.2x10^3/uL


(0.0-0.7)


 


Basophils # (Auto)


  0.0x10^3/uL


(0.0-0.2)


 


Prothrombin Time


  13.2SEC


(11.7-14.0)


 


Prothrombin Time INR 1.1 (0.8-1.1)  


 


Sodium Level


  142mmol/L


(136-145)


 


Potassium Level


  3.6mmol/L


(3.5-5.1)


 


Chloride Level


  108mmol/L


()  H


 


Carbon Dioxide Level


  26mmol/L


(21-32)


 


Anion Gap 8 (6-14)  


 


Blood Urea Nitrogen


  6mg/dL (8-26)


L


 


Creatinine


  0.8mg/dL


(0.7-1.3)


 


Estimated GFR


(Cockcroft-Gault) 119.7  


 


 


BUN/Creatinine Ratio 8 (6-20)  


 


Glucose Level


  106mg/dL


(70-99)  H


 


Calcium Level


  8.7mg/dL


(8.5-10.1)


 


Total Bilirubin


  0.3mg/dL


(0.2-1.0)


 


Aspartate Amino Transferase


(AST) 12U/L (15-37)


L


 


Alanine Aminotransferase (ALT) 18U/L (16-63)  


 


Alkaline Phosphatase


  76U/L ()


 


 


Creatine Kinase


  148U/L


()


 


Creatine Kinase MB (Mass)


  0.8ng/mL


(0.0-3.6)


 


Creatine Kinase MB Relative


Index 0.5% (0-4)  


 


 


Troponin I Quantitative


  < 0.017ng/mL


(0.000-0.055)


 


NT-Pro-B-Type Natriuretic


Peptide 29pg/mL


(0-124)


 


Total Protein


  7.0g/dL


(6.4-8.2)


 


Albumin


  3.3g/dL


(3.4-5.0)  L


 


Albumin/Globulin Ratio


  0.9 (1.0-1.7)


L


 


Lipase


  457U/L


()  H





 Laboratory Tests


1/12/17 11:12








 Laboratory Tests


1/12/17 11:12











EKG


EKG


[]





Radiology/Procedures


Radiology/Procedures


[]PROCEDURE: TESTICULAR/SCROTUM














EXAM: Testicular sonogram.





HISTORY: Left groin pain.





TECHNIQUE: Grayscale and color Doppler sonographic imaging of the testes with


spectral waveform analysis was performed.





COMPARISON: None.





FINDINGS: The right testis measures 3.3 x 1.9 x 3.3 cm. The left testis


measures 3.7 x 3.3 x 1.8 cm. There is normal symmetric blood flow within both


testes. No focal testicular parenchymal lesion is seen. There is slight


asymmetry in the size of the left greater than right epididymis. There is


symmetric blood flow within the epididymides. There are bilateral epididymal


head cysts, measuring 4 mm on the right and 5 mm of the left. There is a trace


right hydrocele. There is no varicocele.





IMPRESSION:


1. Slight asymmetry in the size of the left greater than right epididymides.


There is no corresponding increased blood flow to suggest epididymitis.


2. Bilateral epididymal head cysts.


3. Trace right hydrocele.

















DICTATED and SIGNED BY:     YASMIN ROME MD


DATE:     01/12/17 1243





CC: RICHARD PAK APRN; NO PCP ~





Course & Med Decision Making


Course & Med Decision Making


Pertinent Labs and Imaging studies reviewed. (See chart for details)





Patient is in the ED with multiple complaints including right lateral neck pain

, the lateral chest pain, and left groin pain since last night. Patient has 

been very insistent on getting pain medicine since he arrived to the ED. I even 

told him  priority will be getting aspirin.





11:02 Ekg interpreted by Dr. Paulson, sinus rate them, incomplete right bundle 

branch block, heart rate 70, QRS interval 106, no STEMI.





CBC no acute findings, CMP with no acute findings, lipase 457. Patient has no 

abdominal pain.





He has not given us urine yet





Chest x-ray interpreted by radiologist as negative for any acute findings.





Scrotal ultrasound is noted for hydrocele





Vitals on arrival to the ED temperature 97.8, heart rate 72, respiration 22 on 

room air, O2 sats 98% on room air, blood pressure 169/98, patient denies any 

history of hypertension.





Considering his age and complaint of chest pain, we admitted patient to rule 

out any cardiac origin for his pain





13:45 consulted with  who accepted patient for admission





13:47 spoke with Rocio Mccarthy NP for cardiology who will follow-up with 

patient





Dragon Disclaimer


Dragon Disclaimer


This electronic medical record was generated, in whole or in part, using a 

voice recognition dictation system.





Departure


Departure


Impression:  


 Primary Impression:  


 Chest pain of uncertain etiology


 Additional Impressions:  


 Neck pain, acute


 Hydrocele in adult


Disposition:  09 ADMITTED AS INPATIENT


Admitting Physician:  Vanessa Jacques


Referrals:  


NO PCP (PCP)





Problem Qualifiers








RICHARD PAK Jan 12, 2017 11:54

## 2017-01-12 NOTE — RAD
EXAM: Testicular sonogram.



HISTORY: Left groin pain.



TECHNIQUE: Grayscale and color Doppler sonographic imaging of the testes with

spectral waveform analysis was performed.



COMPARISON: None.



FINDINGS: The right testis measures 3.3 x 1.9 x 3.3 cm. The left testis

measures 3.7 x 3.3 x 1.8 cm. There is normal symmetric blood flow within both

testes. No focal testicular parenchymal lesion is seen. There is slight

asymmetry in the size of the left greater than right epididymis. There is

symmetric blood flow within the epididymides. There are bilateral epididymal

head cysts, measuring 4 mm on the right and 5 mm of the left. There is a trace

right hydrocele. There is no varicocele.



IMPRESSION:

1. Slight asymmetry in the size of the left greater than right epididymides.

There is no corresponding increased blood flow to suggest epididymitis.

2. Bilateral epididymal head cysts.

3. Trace right hydrocele.

## 2017-01-13 VITALS — DIASTOLIC BLOOD PRESSURE: 69 MMHG | SYSTOLIC BLOOD PRESSURE: 149 MMHG

## 2017-01-13 VITALS — DIASTOLIC BLOOD PRESSURE: 70 MMHG | SYSTOLIC BLOOD PRESSURE: 125 MMHG

## 2017-01-13 VITALS — SYSTOLIC BLOOD PRESSURE: 136 MMHG | DIASTOLIC BLOOD PRESSURE: 71 MMHG

## 2017-01-13 VITALS — DIASTOLIC BLOOD PRESSURE: 75 MMHG | SYSTOLIC BLOOD PRESSURE: 143 MMHG

## 2017-01-13 VITALS — DIASTOLIC BLOOD PRESSURE: 71 MMHG | SYSTOLIC BLOOD PRESSURE: 136 MMHG

## 2017-01-13 VITALS — SYSTOLIC BLOOD PRESSURE: 142 MMHG | DIASTOLIC BLOOD PRESSURE: 69 MMHG

## 2017-01-13 VITALS — SYSTOLIC BLOOD PRESSURE: 143 MMHG | DIASTOLIC BLOOD PRESSURE: 70 MMHG

## 2017-01-13 LAB
ALBUMIN SERPL-MCNC: 2.9 G/DL (ref 3.4–5)
ALBUMIN/GLOB SERPL: 0.9 {RATIO} (ref 1–1.7)
ALP SERPL-CCNC: 65 U/L (ref 46–116)
ALT SERPL-CCNC: 15 U/L (ref 16–63)
ANION GAP SERPL CALC-SCNC: 9 MMOL/L (ref 6–14)
AST SERPL-CCNC: 9 U/L (ref 15–37)
BASOPHILS # BLD AUTO: 0 X10^3/UL (ref 0–0.2)
BASOPHILS NFR BLD: 0 % (ref 0–3)
BILIRUB SERPL-MCNC: 0.5 MG/DL (ref 0.2–1)
BUN SERPL-MCNC: 9 MG/DL (ref 8–26)
BUN/CREAT SERPL: 11 (ref 6–20)
CALCIUM SERPL-MCNC: 8.5 MG/DL (ref 8.5–10.1)
CHLORIDE SERPL-SCNC: 108 MMOL/L (ref 98–107)
CHOLEST SERPL-MCNC: 125 MG/DL (ref 0–200)
CHOLEST/HDLC SERPL: 3 {RATIO}
CO2 SERPL-SCNC: 27 MMOL/L (ref 21–32)
CREAT SERPL-MCNC: 0.8 MG/DL (ref 0.7–1.3)
EOSINOPHIL NFR BLD: 3 % (ref 0–3)
ERYTHROCYTE [DISTWIDTH] IN BLOOD BY AUTOMATED COUNT: 13.6 % (ref 11.5–14.5)
GFR SERPLBLD BASED ON 1.73 SQ M-ARVRAT: 119.7 ML/MIN
GLOBULIN SER-MCNC: 3.4 G/DL (ref 2.2–3.8)
GLUCOSE SERPL-MCNC: 101 MG/DL (ref 70–99)
HCT VFR BLD CALC: 38.9 % (ref 39–53)
HDLC SERPL-MCNC: 42 MG/DL (ref 40–60)
HGB BLD-MCNC: 12.6 G/DL (ref 13–17.5)
LYMPHOCYTES # BLD: 1.5 X10^3/UL (ref 1–4.8)
LYMPHOCYTES NFR BLD AUTO: 15 % (ref 24–48)
MCH RBC QN AUTO: 31 PG (ref 25–35)
MCHC RBC AUTO-ENTMCNC: 32 G/DL (ref 31–37)
MCV RBC AUTO: 96 FL (ref 79–100)
MONOCYTES NFR BLD: 7 % (ref 0–9)
NEUTROPHILS NFR BLD AUTO: 76 % (ref 31–73)
PLATELET # BLD AUTO: 202 X10^3/UL (ref 140–400)
POTASSIUM SERPL-SCNC: 3.6 MMOL/L (ref 3.5–5.1)
PROT SERPL-MCNC: 6.3 G/DL (ref 6.4–8.2)
RBC # BLD AUTO: 4.07 X10^6/UL (ref 4.3–5.7)
SODIUM SERPL-SCNC: 144 MMOL/L (ref 136–145)
TRIGL SERPL-MCNC: 56 MG/DL (ref 0–150)
WBC # BLD AUTO: 10 X10^3/UL (ref 4–11)

## 2017-01-13 RX ADMIN — OXYCODONE HYDROCHLORIDE AND ACETAMINOPHEN PRN TAB: 10; 325 TABLET ORAL at 12:38

## 2017-01-13 RX ADMIN — TRAZODONE HYDROCHLORIDE SCH MG: 100 TABLET ORAL at 21:51

## 2017-01-13 RX ADMIN — MORPHINE SULFATE PRN MG: 4 INJECTION, SOLUTION INTRAMUSCULAR; INTRAVENOUS at 12:39

## 2017-01-13 RX ADMIN — OXYCODONE HYDROCHLORIDE AND ACETAMINOPHEN PRN TAB: 10; 325 TABLET ORAL at 18:50

## 2017-01-13 RX ADMIN — LIDOCAINE SCH PATCH: 50 PATCH CUTANEOUS at 08:17

## 2017-01-13 RX ADMIN — OXYCODONE HYDROCHLORIDE AND ACETAMINOPHEN PRN TAB: 10; 325 TABLET ORAL at 02:50

## 2017-01-13 RX ADMIN — DOCUSATE SODIUM SCH MG: 100 CAPSULE, LIQUID FILLED ORAL at 21:50

## 2017-01-13 RX ADMIN — MORPHINE SULFATE PRN MG: 4 INJECTION, SOLUTION INTRAMUSCULAR; INTRAVENOUS at 08:16

## 2017-01-13 RX ADMIN — MORPHINE SULFATE PRN MG: 2 INJECTION, SOLUTION INTRAMUSCULAR; INTRAVENOUS at 21:51

## 2017-01-13 RX ADMIN — OXYCODONE HYDROCHLORIDE AND ACETAMINOPHEN PRN TAB: 10; 325 TABLET ORAL at 08:15

## 2017-01-13 NOTE — EKG
[facility]

              [address] [place] [address]

Test Date:    2017               Test Time:    07:34:40

Pat Name:     OSMIN BARRAZA           Department:   

Patient ID:   [id number]           Room:         408 

Gender:       M                        Technician:   DU

:          [id number]               Requested By: RICHARD PAK

Order Number: [id number]            Reading MD:   Terrence Cooper

                                 Measurements

Intervals                              Axis          

Rate:         62                       P:            33

MO:           166                      QRS:          134

QRSD:         106                      T:            25

QT:           390                                    

QTc:          398                                    

                           Interpretive Statements

SINUS RHYTHM



Electronically Signed On 2017 10:27:02 CST by Terrence Cooper

## 2017-01-13 NOTE — CARD
--------------- APPROVED REPORT --------------





EXAM: Two-dimensional and M-mode echocardiogram with Doppler and color Doppler.



Other Information 

Quality : GoodHR: 67bpm

Rhythm : NSR



INDICATION

Chest Pain 



2D DIMENSIONS 

RVDd2.3 (2.9-3.5cm)Left Atrium(2D)3.9 (1.6-4.0cm)

IVSd1.3 (0.7-1.1cm)Aortic Root(2D)3.5 (2.0-3.7cm)

LVDd4.0 (3.9-5.9cm)LVOT Diameter2.3 (1.8-2.4cm)

PWd1.3 (0.7-1.1cm)LVDs2.6 (2.5-4.0cm)

FS (%) 35.5 %SV44.9 ml

LVEF(%)65.6 (>50%)



Aortic Valve

AoV Peak Luis Enrique.200.9cm/sAoV VTI41.1cm

AO Peak GR.16.1mmHgLVOT Peak Luis Enrique.127.6cm/s

AO Mean GR.8mmHgAVA (VMAX)2.68cm2

AI P 1/2 [id number]



Mitral Valve

MV E Vejfjsmz149.9cm/sMV DECEL [id number]

MV A Hblgwjug441.2cm/sE/A  Ratio0.8

MV A [id number]



Pulmonary Valve

PV Peak Npjpuuru47.0cm/s



Pulmonary Vein

S1 Ltitjwne78.0cm/sD2 Vmtsrbpv79.7cm/s

PVa [id number]



 LEFT VENTRICLE 

The left ventricle is normal size. There is mild concentric left ventricular hypertrophy. The left ve
ntricular systolic function is normal and the ejection fraction is within normal range. The Ejection 
Fraction is 60-65%. There is normal LV segmental wall motion. Transmitral Doppler flow pattern is Gra
de I-abnormal relaxation pattern.



 RIGHT VENTRICLE 

The right ventricle is normal size. There is normal right ventricular wall thickness. The right ventr
icular systolic function is normal.



 ATRIA 

The left atrium size is normal. The right atrium size is normal. The interatrial septum is intact wit
h no evidence for an atrial septal defect or patent foramen ovale as noted on 2-D or Doppler imaging.




 AORTIC VALVE 

The aortic valve is normal in structure and function. Doppler and Color Flow revealed trace to mild a
ortic regurgitation. There is no significant aortic valvular stenosis.



 MITRAL VALVE 

The mitral valve is normal in structure and function. There is no evidence of mitral valve prolapse. 
There is no mitral valve stenosis. Doppler and Color Flow revealed trace mitral regurgitation.



 TRICUSPID VALVE 

The tricuspid valve is normal in structure and function. Doppler and Color Flow revealed no tricuspid
 valve regurgitation noted. There is no tricuspid valve stenosis.



 PULMONIC VALVE 

The pulmonary valve is normal in structure and function. Doppler and Color Flow revealed trace pulmon
ic valvular regurgitation. There is no pulmonic valvular stenosis.



 GREAT VESSELS 

The aortic root is normal in size. The ascending aorta is normal in size. The pulmonary artery is nor
mal. The IVC is normal in size and collapses >50% with inspiration.



 PERICARDIAL EFFUSION 

There is no evidence of significant pericardial effusion.



Critical Notification

Critical Value: No



<Conclusion>

The left ventricle is normal size.

The left ventricular systolic function is normal and the ejection fraction is within normal range.

The Ejection Fraction is 60-65%.

There is mild concentric left ventricular hypertrophy.

There is no significant aortic valvular stenosis.

Doppler and Color Flow revealed trace to mild aortic regurgitation.

Doppler and Color Flow revealed trace mitral regurgitation.

Doppler and Color Flow revealed no tricuspid valve regurgitation noted.

## 2017-01-13 NOTE — PDOC
ORTHO PROGRESS NOTES


Vitals





 Vital Signs








  Date Time  Temp Pulse Resp B/P Pulse Ox O2 Delivery O2 Flow Rate FiO2


 


1/13/17 09:09      Room Air  


 


1/13/17 07:00 97.9 59 18 136/71 96   





 97.9       








Labs





Laboratory Tests








Test


  1/12/17


11:12 1/12/17


17:05 1/13/17


02:00


 


White Blood Count


  10.8x10^3/uL


(4.0-11.0) 


  10.0x10^3/uL


(4.0-11.0)


 


Red Blood Count


  4.43x10^6/uL


(4.30-5.70) 


  4.07x10^6/uL


(4.30-5.70)


 


Hemoglobin


  13.7g/dL


(13.0-17.5) 


  12.6g/dL


(13.0-17.5)


 


Hematocrit


  41.1%


(39.0-53.0) 


  38.9%


(39.0-53.0)


 


Mean Corpuscular Volume 93fL ()   96fL () 


 


Mean Corpuscular Hemoglobin 31pg (25-35)   31pg (25-35) 


 


Mean Corpuscular Hemoglobin


Concent 33g/dL (31-37) 


  


  32g/dL (31-37) 


 


 


Red Cell Distribution Width


  13.3%


(11.5-14.5) 


  13.6%


(11.5-14.5)


 


Platelet Count


  228x10^3/uL


(140-400) 


  202x10^3/uL


(140-400)


 


Neutrophils (%) (Auto) 82% (31-73)   76% (31-73) 


 


Lymphocytes (%) (Auto) 10% (24-48)   15% (24-48) 


 


Monocytes (%) (Auto) 6% (0-9)   7% (0-9) 


 


Eosinophils (%) (Auto) 2% (0-3)   3% (0-3) 


 


Basophils (%) (Auto) 0% (0-3)   0% (0-3) 


 


Neutrophils # (Auto)


  8.8x10^3uL


(1.8-7.7) 


  7.6x10^3uL


(1.8-7.7)


 


Lymphocytes # (Auto)


  1.1x10^3/uL


(1.0-4.8) 


  1.5x10^3/uL


(1.0-4.8)


 


Monocytes # (Auto)


  0.6x10^3/uL


(0.0-1.1) 


  0.7x10^3/uL


(0.0-1.1)


 


Eosinophils # (Auto)


  0.2x10^3/uL


(0.0-0.7) 


  0.3x10^3/uL


(0.0-0.7)


 


Basophils # (Auto)


  0.0x10^3/uL


(0.0-0.2) 


  0.0x10^3/uL


(0.0-0.2)


 


Prothrombin Time


  13.2SEC


(11.7-14.0) 


  


 


 


Prothromb Time International


Ratio 1.1 (0.8-1.1) 


  


  


 


 


Sodium Level


  142mmol/L


(136-145) 


  144mmol/L


(136-145)


 


Potassium Level


  3.6mmol/L


(3.5-5.1) 


  3.6mmol/L


(3.5-5.1)


 


Chloride Level


  108mmol/L


() 


  108mmol/L


()


 


Carbon Dioxide Level


  26mmol/L


(21-32) 


  27mmol/L


(21-32)


 


Anion Gap 8 (6-14)   9 (6-14) 


 


Blood Urea Nitrogen 6mg/dL (8-26)   9mg/dL (8-26) 


 


Creatinine


  0.8mg/dL


(0.7-1.3) 


  0.8mg/dL


(0.7-1.3)


 


Estimated GFR


(Cockcroft-Gault) 119.7 


  


  119.7 


 


 


BUN/Creatinine Ratio 8 (6-20)   11 (6-20) 


 


Glucose Level


  106mg/dL


(70-99) 


  101mg/dL


(70-99)


 


Calcium Level


  8.7mg/dL


(8.5-10.1) 


  8.5mg/dL


(8.5-10.1)


 


Total Bilirubin


  0.3mg/dL


(0.2-1.0) 


  0.5mg/dL


(0.2-1.0)


 


Aspartate Amino Transf


(AST/SGOT) 12U/L (15-37) 


  


  9U/L (15-37) 


 


 


Alanine Aminotransferase


(ALT/SGPT) 18U/L (16-63) 


  


  15U/L (16-63) 


 


 


Alkaline Phosphatase 76U/L ()   65U/L () 


 


Creatine Kinase


  148U/L


() 


  


 


 


Creatine Kinase MB (Mass)


  0.8ng/mL


(0.0-3.6) 


  


 


 


Creatine Kinase MB Relative


Index 0.5% (0-4) 


  


  


 


 


Troponin I Quantitative


  < 0.017ng/mL


(0.000-0.055) < 0.017ng/mL


(0.000-0.055) < 0.017ng/mL


(0.000-0.055)


 


NT-Pro-B-Type Natriuretic


Peptide 29pg/mL


(0-124) 


  


 


 


Total Protein


  7.0g/dL


(6.4-8.2) 


  6.3g/dL


(6.4-8.2)


 


Albumin


  3.3g/dL


(3.4-5.0) 


  2.9g/dL


(3.4-5.0)


 


Albumin/Globulin Ratio 0.9 (1.0-1.7)   0.9 (1.0-1.7) 


 


Amylase Level


  261U/L


() 


  


 


 


Lipase


  457U/L


() 


  


 


 


Triglycerides Level


  


  


  56mg/dL


(0-150)


 


Cholesterol Level


  


  


  125mg/dL


(0-200)


 


LDL Cholesterol, Calculated


  


  


  72mg/dL


(0-100)


 


VLDL Cholesterol, Calculated   11mg/dL (0-40) 


 


HDL Cholesterol


  


  


  42mg/dL


(40-60)


 


Cholesterol/HDL Ratio   3.0 








Laboratory Tests








Test


  1/12/17


11:12 1/12/17


17:05 1/13/17


02:00


 


White Blood Count


  10.8x10^3/uL


(4.0-11.0) 


  10.0x10^3/uL


(4.0-11.0)


 


Red Blood Count


  4.43x10^6/uL


(4.30-5.70) 


  4.07x10^6/uL


(4.30-5.70)


 


Hemoglobin


  13.7g/dL


(13.0-17.5) 


  12.6g/dL


(13.0-17.5)


 


Hematocrit


  41.1%


(39.0-53.0) 


  38.9%


(39.0-53.0)


 


Mean Corpuscular Volume 93fL ()   96fL () 


 


Mean Corpuscular Hemoglobin 31pg (25-35)   31pg (25-35) 


 


Mean Corpuscular Hemoglobin


Concent 33g/dL (31-37) 


  


  32g/dL (31-37) 


 


 


Red Cell Distribution Width


  13.3%


(11.5-14.5) 


  13.6%


(11.5-14.5)


 


Platelet Count


  228x10^3/uL


(140-400) 


  202x10^3/uL


(140-400)


 


Neutrophils (%) (Auto) 82% (31-73)   76% (31-73) 


 


Lymphocytes (%) (Auto) 10% (24-48)   15% (24-48) 


 


Monocytes (%) (Auto) 6% (0-9)   7% (0-9) 


 


Eosinophils (%) (Auto) 2% (0-3)   3% (0-3) 


 


Basophils (%) (Auto) 0% (0-3)   0% (0-3) 


 


Neutrophils # (Auto)


  8.8x10^3uL


(1.8-7.7) 


  7.6x10^3uL


(1.8-7.7)


 


Lymphocytes # (Auto)


  1.1x10^3/uL


(1.0-4.8) 


  1.5x10^3/uL


(1.0-4.8)


 


Monocytes # (Auto)


  0.6x10^3/uL


(0.0-1.1) 


  0.7x10^3/uL


(0.0-1.1)


 


Eosinophils # (Auto)


  0.2x10^3/uL


(0.0-0.7) 


  0.3x10^3/uL


(0.0-0.7)


 


Basophils # (Auto)


  0.0x10^3/uL


(0.0-0.2) 


  0.0x10^3/uL


(0.0-0.2)


 


Prothrombin Time


  13.2SEC


(11.7-14.0) 


  


 


 


Prothromb Time International


Ratio 1.1 (0.8-1.1) 


  


  


 


 


Sodium Level


  142mmol/L


(136-145) 


  144mmol/L


(136-145)


 


Potassium Level


  3.6mmol/L


(3.5-5.1) 


  3.6mmol/L


(3.5-5.1)


 


Chloride Level


  108mmol/L


() 


  108mmol/L


()


 


Carbon Dioxide Level


  26mmol/L


(21-32) 


  27mmol/L


(21-32)


 


Anion Gap 8 (6-14)   9 (6-14) 


 


Blood Urea Nitrogen 6mg/dL (8-26)   9mg/dL (8-26) 


 


Creatinine


  0.8mg/dL


(0.7-1.3) 


  0.8mg/dL


(0.7-1.3)


 


Estimated GFR


(Cockcroft-Gault) 119.7 


  


  119.7 


 


 


BUN/Creatinine Ratio 8 (6-20)   11 (6-20) 


 


Glucose Level


  106mg/dL


(70-99) 


  101mg/dL


(70-99)


 


Calcium Level


  8.7mg/dL


(8.5-10.1) 


  8.5mg/dL


(8.5-10.1)


 


Total Bilirubin


  0.3mg/dL


(0.2-1.0) 


  0.5mg/dL


(0.2-1.0)


 


Aspartate Amino Transf


(AST/SGOT) 12U/L (15-37) 


  


  9U/L (15-37) 


 


 


Alanine Aminotransferase


(ALT/SGPT) 18U/L (16-63) 


  


  15U/L (16-63) 


 


 


Alkaline Phosphatase 76U/L ()   65U/L () 


 


Creatine Kinase


  148U/L


() 


  


 


 


Creatine Kinase MB (Mass)


  0.8ng/mL


(0.0-3.6) 


  


 


 


Creatine Kinase MB Relative


Index 0.5% (0-4) 


  


  


 


 


Troponin I Quantitative


  < 0.017ng/mL


(0.000-0.055) < 0.017ng/mL


(0.000-0.055) < 0.017ng/mL


(0.000-0.055)


 


NT-Pro-B-Type Natriuretic


Peptide 29pg/mL


(0-124) 


  


 


 


Total Protein


  7.0g/dL


(6.4-8.2) 


  6.3g/dL


(6.4-8.2)


 


Albumin


  3.3g/dL


(3.4-5.0) 


  2.9g/dL


(3.4-5.0)


 


Albumin/Globulin Ratio 0.9 (1.0-1.7)   0.9 (1.0-1.7) 


 


Amylase Level


  261U/L


() 


  


 


 


Lipase


  457U/L


() 


  


 


 


Triglycerides Level


  


  


  56mg/dL


(0-150)


 


Cholesterol Level


  


  


  125mg/dL


(0-200)


 


LDL Cholesterol, Calculated


  


  


  72mg/dL


(0-100)


 


VLDL Cholesterol, Calculated   11mg/dL (0-40) 


 


HDL Cholesterol


  


  


  42mg/dL


(40-60)


 


Cholesterol/HDL Ratio   3.0 








Assessment and Plan


note dictated


L groin pain x 1wk


AVN on xray, will image R hip and MRI bilateral hips








TISH COOPER II, MD Jan 13, 2017 09:21

## 2017-01-13 NOTE — PDOC
PROGRESS NOTES


Chief Complaint


Chief Complaint


1, Atypical chest pain in an adult at rest - unlikely ACS , but admitted for 

rule out


2. SMoker


3 Avascular necrosis of bilateral hip


4. Lumbago with recent laminectomy





History of Present Illness


History of Present Illness


NO inc in CP


Left groin pain is the more bothersome if at all





XRay reviewed, shows bilateral necrosis





Reviewed films with orthopedics - will do MRI, if need injections for pain 

relief, usually CT guided by IR


Plan of care dw pt





Vitals


Vitals





 Vital Signs








  Date Time  Temp Pulse Resp B/P Pulse Ox O2 Delivery O2 Flow Rate FiO2


 


1/13/17 09:09      Room Air  


 


1/13/17 07:00 97.9 59 18 136/71 96   





 97.9       











Physical Exam


General:  Alert, Oriented X3, Cooperative, mild distress


Abdomen:  Soft, Other (hyperactive bowel sounds to all quads; Diffuse abd 

tenderness localazing with more intensity to LLQ. )


Extremities:  No cyanosis, No edema


Skin:  No breakdown, No significant lesion





Labs


LABS





Laboratory Tests








Test


  1/12/17


17:05 1/13/17


02:00


 


Troponin I Quantitative


  < 0.017ng/mL


(0.000-0.055) < 0.017ng/mL


(0.000-0.055)


 


White Blood Count


  


  10.0x10^3/uL


(4.0-11.0)


 


Red Blood Count


  


  4.07x10^6/uL


(4.30-5.70)


 


Hemoglobin


  


  12.6g/dL


(13.0-17.5)


 


Hematocrit


  


  38.9%


(39.0-53.0)


 


Mean Corpuscular Volume  96fL () 


 


Mean Corpuscular Hemoglobin  31pg (25-35) 


 


Mean Corpuscular Hemoglobin


Concent 


  32g/dL (31-37) 


 


 


Red Cell Distribution Width


  


  13.6%


(11.5-14.5)


 


Platelet Count


  


  202x10^3/uL


(140-400)


 


Neutrophils (%) (Auto)  76% (31-73) 


 


Lymphocytes (%) (Auto)  15% (24-48) 


 


Monocytes (%) (Auto)  7% (0-9) 


 


Eosinophils (%) (Auto)  3% (0-3) 


 


Basophils (%) (Auto)  0% (0-3) 


 


Neutrophils # (Auto)


  


  7.6x10^3uL


(1.8-7.7)


 


Lymphocytes # (Auto)


  


  1.5x10^3/uL


(1.0-4.8)


 


Monocytes # (Auto)


  


  0.7x10^3/uL


(0.0-1.1)


 


Eosinophils # (Auto)


  


  0.3x10^3/uL


(0.0-0.7)


 


Basophils # (Auto)


  


  0.0x10^3/uL


(0.0-0.2)


 


Sodium Level


  


  144mmol/L


(136-145)


 


Potassium Level


  


  3.6mmol/L


(3.5-5.1)


 


Chloride Level


  


  108mmol/L


()


 


Carbon Dioxide Level


  


  27mmol/L


(21-32)


 


Anion Gap  9 (6-14) 


 


Blood Urea Nitrogen  9mg/dL (8-26) 


 


Creatinine


  


  0.8mg/dL


(0.7-1.3)


 


Estimated GFR


(Cockcroft-Gault) 


  119.7 


 


 


BUN/Creatinine Ratio  11 (6-20) 


 


Glucose Level


  


  101mg/dL


(70-99)


 


Calcium Level


  


  8.5mg/dL


(8.5-10.1)


 


Total Bilirubin


  


  0.5mg/dL


(0.2-1.0)


 


Aspartate Amino Transf


(AST/SGOT) 


  9U/L (15-37) 


 


 


Alanine Aminotransferase


(ALT/SGPT) 


  15U/L (16-63) 


 


 


Alkaline Phosphatase  65U/L () 


 


Total Protein


  


  6.3g/dL


(6.4-8.2)


 


Albumin


  


  2.9g/dL


(3.4-5.0)


 


Albumin/Globulin Ratio  0.9 (1.0-1.7) 


 


Triglycerides Level


  


  56mg/dL


(0-150)


 


Cholesterol Level


  


  125mg/dL


(0-200)


 


LDL Cholesterol, Calculated


  


  72mg/dL


(0-100)


 


VLDL Cholesterol, Calculated  11mg/dL (0-40) 


 


HDL Cholesterol


  


  42mg/dL


(40-60)


 


Cholesterol/HDL Ratio  3.0 











Review of Systems


Review of Systems


NO inc in CP, no SOA


Groin pain





Assessment and Plan


Assessmemt and Plan


ortho consult


MRI hip today


further tx Pending MRI 


cont supprotive care


await echo results


ok to dc tele


 Problems


Medical Problems:


(1) Chest pain


Status: Acute  





(2) Chest pain of uncertain etiology


Status: Acute  





(3) Hydrocele in adult


Status: Acute  





(4) Neck pain, acute


Status: Acute  





Problems:  





Comment


Review of Relevant


I have reviewed the following items fred (where applicable) has been applied.


Labs





Laboratory Tests








Test


  1/12/17


11:12 1/12/17


17:05 1/13/17


02:00


 


White Blood Count


  10.8x10^3/uL


(4.0-11.0) 


  10.0x10^3/uL


(4.0-11.0)


 


Red Blood Count


  4.43x10^6/uL


(4.30-5.70) 


  4.07x10^6/uL


(4.30-5.70)


 


Hemoglobin


  13.7g/dL


(13.0-17.5) 


  12.6g/dL


(13.0-17.5)


 


Hematocrit


  41.1%


(39.0-53.0) 


  38.9%


(39.0-53.0)


 


Mean Corpuscular Volume 93fL ()   96fL () 


 


Mean Corpuscular Hemoglobin 31pg (25-35)   31pg (25-35) 


 


Mean Corpuscular Hemoglobin


Concent 33g/dL (31-37) 


  


  32g/dL (31-37) 


 


 


Red Cell Distribution Width


  13.3%


(11.5-14.5) 


  13.6%


(11.5-14.5)


 


Platelet Count


  228x10^3/uL


(140-400) 


  202x10^3/uL


(140-400)


 


Neutrophils (%) (Auto) 82% (31-73)   76% (31-73) 


 


Lymphocytes (%) (Auto) 10% (24-48)   15% (24-48) 


 


Monocytes (%) (Auto) 6% (0-9)   7% (0-9) 


 


Eosinophils (%) (Auto) 2% (0-3)   3% (0-3) 


 


Basophils (%) (Auto) 0% (0-3)   0% (0-3) 


 


Neutrophils # (Auto)


  8.8x10^3uL


(1.8-7.7) 


  7.6x10^3uL


(1.8-7.7)


 


Lymphocytes # (Auto)


  1.1x10^3/uL


(1.0-4.8) 


  1.5x10^3/uL


(1.0-4.8)


 


Monocytes # (Auto)


  0.6x10^3/uL


(0.0-1.1) 


  0.7x10^3/uL


(0.0-1.1)


 


Eosinophils # (Auto)


  0.2x10^3/uL


(0.0-0.7) 


  0.3x10^3/uL


(0.0-0.7)


 


Basophils # (Auto)


  0.0x10^3/uL


(0.0-0.2) 


  0.0x10^3/uL


(0.0-0.2)


 


Prothrombin Time


  13.2SEC


(11.7-14.0) 


  


 


 


Prothromb Time International


Ratio 1.1 (0.8-1.1) 


  


  


 


 


Sodium Level


  142mmol/L


(136-145) 


  144mmol/L


(136-145)


 


Potassium Level


  3.6mmol/L


(3.5-5.1) 


  3.6mmol/L


(3.5-5.1)


 


Chloride Level


  108mmol/L


() 


  108mmol/L


()


 


Carbon Dioxide Level


  26mmol/L


(21-32) 


  27mmol/L


(21-32)


 


Anion Gap 8 (6-14)   9 (6-14) 


 


Blood Urea Nitrogen 6mg/dL (8-26)   9mg/dL (8-26) 


 


Creatinine


  0.8mg/dL


(0.7-1.3) 


  0.8mg/dL


(0.7-1.3)


 


Estimated GFR


(Cockcroft-Gault) 119.7 


  


  119.7 


 


 


BUN/Creatinine Ratio 8 (6-20)   11 (6-20) 


 


Glucose Level


  106mg/dL


(70-99) 


  101mg/dL


(70-99)


 


Calcium Level


  8.7mg/dL


(8.5-10.1) 


  8.5mg/dL


(8.5-10.1)


 


Total Bilirubin


  0.3mg/dL


(0.2-1.0) 


  0.5mg/dL


(0.2-1.0)


 


Aspartate Amino Transf


(AST/SGOT) 12U/L (15-37) 


  


  9U/L (15-37) 


 


 


Alanine Aminotransferase


(ALT/SGPT) 18U/L (16-63) 


  


  15U/L (16-63) 


 


 


Alkaline Phosphatase 76U/L ()   65U/L () 


 


Creatine Kinase


  148U/L


() 


  


 


 


Creatine Kinase MB (Mass)


  0.8ng/mL


(0.0-3.6) 


  


 


 


Creatine Kinase MB Relative


Index 0.5% (0-4) 


  


  


 


 


Troponin I Quantitative


  < 0.017ng/mL


(0.000-0.055) < 0.017ng/mL


(0.000-0.055) < 0.017ng/mL


(0.000-0.055)


 


NT-Pro-B-Type Natriuretic


Peptide 29pg/mL


(0-124) 


  


 


 


Total Protein


  7.0g/dL


(6.4-8.2) 


  6.3g/dL


(6.4-8.2)


 


Albumin


  3.3g/dL


(3.4-5.0) 


  2.9g/dL


(3.4-5.0)


 


Albumin/Globulin Ratio 0.9 (1.0-1.7)   0.9 (1.0-1.7) 


 


Amylase Level


  261U/L


() 


  


 


 


Lipase


  457U/L


() 


  


 


 


Triglycerides Level


  


  


  56mg/dL


(0-150)


 


Cholesterol Level


  


  


  125mg/dL


(0-200)


 


LDL Cholesterol, Calculated


  


  


  72mg/dL


(0-100)


 


VLDL Cholesterol, Calculated   11mg/dL (0-40) 


 


HDL Cholesterol


  


  


  42mg/dL


(40-60)


 


Cholesterol/HDL Ratio   3.0 








Laboratory Tests








Test


  1/12/17


17:05 1/13/17


02:00


 


Troponin I Quantitative


  < 0.017ng/mL


(0.000-0.055) < 0.017ng/mL


(0.000-0.055)


 


White Blood Count


  


  10.0x10^3/uL


(4.0-11.0)


 


Red Blood Count


  


  4.07x10^6/uL


(4.30-5.70)


 


Hemoglobin


  


  12.6g/dL


(13.0-17.5)


 


Hematocrit


  


  38.9%


(39.0-53.0)


 


Mean Corpuscular Volume  96fL () 


 


Mean Corpuscular Hemoglobin  31pg (25-35) 


 


Mean Corpuscular Hemoglobin


Concent 


  32g/dL (31-37) 


 


 


Red Cell Distribution Width


  


  13.6%


(11.5-14.5)


 


Platelet Count


  


  202x10^3/uL


(140-400)


 


Neutrophils (%) (Auto)  76% (31-73) 


 


Lymphocytes (%) (Auto)  15% (24-48) 


 


Monocytes (%) (Auto)  7% (0-9) 


 


Eosinophils (%) (Auto)  3% (0-3) 


 


Basophils (%) (Auto)  0% (0-3) 


 


Neutrophils # (Auto)


  


  7.6x10^3uL


(1.8-7.7)


 


Lymphocytes # (Auto)


  


  1.5x10^3/uL


(1.0-4.8)


 


Monocytes # (Auto)


  


  0.7x10^3/uL


(0.0-1.1)


 


Eosinophils # (Auto)


  


  0.3x10^3/uL


(0.0-0.7)


 


Basophils # (Auto)


  


  0.0x10^3/uL


(0.0-0.2)


 


Sodium Level


  


  144mmol/L


(136-145)


 


Potassium Level


  


  3.6mmol/L


(3.5-5.1)


 


Chloride Level


  


  108mmol/L


()


 


Carbon Dioxide Level


  


  27mmol/L


(21-32)


 


Anion Gap  9 (6-14) 


 


Blood Urea Nitrogen  9mg/dL (8-26) 


 


Creatinine


  


  0.8mg/dL


(0.7-1.3)


 


Estimated GFR


(Cockcroft-Gault) 


  119.7 


 


 


BUN/Creatinine Ratio  11 (6-20) 


 


Glucose Level


  


  101mg/dL


(70-99)


 


Calcium Level


  


  8.5mg/dL


(8.5-10.1)


 


Total Bilirubin


  


  0.5mg/dL


(0.2-1.0)


 


Aspartate Amino Transf


(AST/SGOT) 


  9U/L (15-37) 


 


 


Alanine Aminotransferase


(ALT/SGPT) 


  15U/L (16-63) 


 


 


Alkaline Phosphatase  65U/L () 


 


Total Protein


  


  6.3g/dL


(6.4-8.2)


 


Albumin


  


  2.9g/dL


(3.4-5.0)


 


Albumin/Globulin Ratio  0.9 (1.0-1.7) 


 


Triglycerides Level


  


  56mg/dL


(0-150)


 


Cholesterol Level


  


  125mg/dL


(0-200)


 


LDL Cholesterol, Calculated


  


  72mg/dL


(0-100)


 


VLDL Cholesterol, Calculated  11mg/dL (0-40) 


 


HDL Cholesterol


  


  42mg/dL


(40-60)


 


Cholesterol/HDL Ratio  3.0 








Medications





 Current Medications


Aspirin ([place] Aspirin) 325 mg 1X  ONCE PO  Last administered on 1/12/17at 11:45

;  Start 1/12/17 at 11:30;  Stop 1/12/17 at 11:31;  Status DC


Morphine Sulfate 5 mg 1X  ONCE IV  Last administered on 1/12/17at 11:46;  Start 

1/12/17 at 11:30;  Stop 1/12/17 at 11:31;  Status DC


Ondansetron HCl (Zofran) 4 mg PRN Q8HRS  PRN IV NAUSEA/VOMITING;  Start 1/12/17 

at 14:15;  Stop 1/12/17 at 14:55;  Status DC


Morphine Sulfate 4 mg PRN Q2HR  PRN IV PAIN Last administered on 1/13/17at 08:16

;  Start 1/12/17 at 14:15;  Stop 1/13/17 at 14:14


Acetaminophen (Tylenol) 650 mg PRN Q4HRS  PRN PO FEVER;  Start 1/12/17 at 14:15

;  Stop 1/13/17 at 14:14


Ondansetron HCl (Zofran) 4 mg PRN Q6HRS  PRN IV NAUSEA/VOMITING;  Start 1/12/17 

at 14:52


Lidocaine (Lidoderm) 1 patch DAILY TD  Last administered on 1/12/17at 16:33;  

Start 1/12/17 at 16:00


Acetaminophen/ Hydrocodone Bitart (Lortab 5/325) 1 tab PRN Q6HRS  PRN PO PAIN 

Last administered on 1/12/17at 22:14;  Start 1/12/17 at 15:00


Ondansetron HCl (Zofran Odt) 4 mg PRN Q8HRS  PRN PO n/v;  Start 1/12/17 at 15:00


Oxycodone/ Acetaminophen (Percocet 10/325) 1 tab PRN QID  PRN PO pain Last 

administered on 1/13/17at 08:15;  Start 1/12/17 at 15:00


Trazodone HCl (Desyrel) 100 mg DAILY PO ;  Start 1/13/17 at 09:00;  Stop 1/13/ 17 at 09:00;  Status DC


Trazodone HCl (Desyrel) 100 mg HS PO  Last administered on 1/12/17at 22:16;  

Start 1/12/17 at 21:00





Active Scripts


Active


Hydrocodone-Apap 5-325  ** (Hydrocodone Bit/Acetaminophen) 1 Each Tablet 1 Tab 

PO PRN Q6HRS PRN


Zofran Odt (Ondansetron) 4 Mg Tab.rapdis 1 Tab SL Q8HRS


Reported


Baclofen 10 Mg Tablet 1 Tab PO PRN QID PRN


Percocet  Mg Tablet (Oxycodone/Acetaminophen) 1 Each Tablet 1 Each PO 


Trazodone Hcl 100 Mg Tablet 100 Mg PO 


Diazepam 10 Mg Tablet 10 Mg PO


Vitals/I & O





 Vital Sign - Last 24 Hours








 1/12/17 1/12/17 1/12/17 1/12/17





 11:45 14:00 14:00 15:00


 


Pulse 72  68 62


 


Resp 16  16 16


 


B/P 167/90   


 


Pulse Ox 99  97 97


 


O2 Delivery Room Air Room Air Room Air 





 1/12/17 1/12/17 1/12/17 1/12/17





 16:10 16:31 19:00 19:06


 


Temp 98.1  97.9 





 98.1  97.9 


 


Pulse 69  64 


 


Resp 18  18 16


 


B/P 134/73  151/79 


 


Pulse Ox 96  97 


 


O2 Delivery Room Air Room Air Room Air 


 


    





    





 1/12/17 1/12/17 1/12/17 1/12/17





 20:00 22:14 23:10 23:45


 


Temp    98.2





    98.2


 


Pulse    62


 


Resp  16 15 18


 


B/P    140/89


 


Pulse Ox    94


 


O2 Delivery Room Air Room Air Room Air Room Air


 


    





    





 1/13/17 1/13/17 1/13/17 1/13/17





 02:50 03:19 03:50 07:00


 


Temp  98.4  97.9





  98.4  97.9


 


Pulse  67  59


 


Resp 16 18 14 18


 


B/P  142/69  136/71


 


Pulse Ox  91  96


 


O2 Delivery Room Air Room Air  Room Air


 


    





    





 1/13/17 1/13/17 1/13/17 1/13/17





 08:00 08:15 09:09 09:09


 


O2 Delivery Room Air Room Air Room Air Room Air














 Intake and Output   


 


 1/12/17 1/12/17 1/13/17





 15:00 23:00 07:00


 


Intake Total   400 ml


 


Output Total  0 ml 


 


Balance  0 ml 400 ml














GRACE CEJA MD Jan 13, 2017 11:21

## 2017-01-13 NOTE — CONS
DATE OF CONSULTATION:  01/13/2017



REFERRING PROVIDER:  Dr. Jacques



CONSULTING PROVIDER:  Kalia Cooper MD



REASON FOR CONSULTATION:  Left hip pain and AVN.



CHIEF COMPLAINT:  Left groin pain.



HISTORY OF PRESENT ILLNESS:  The patient is a very pleasant 61-year-old who had

a recent laminectomy and fusion at  who presents to the Emergency Department

complaining of chest and neck pain as well as left groin pain.  He has been seen

by Cardiology and is undergoing cardiac workup.  He denies any trauma to his hip

region.  He tells me his whole left leg feels numb and tingly at times.  He also

has groin pain.  The groin pain does not radiate.  It began atraumatically and

approximately 1 week ago.  Denies any pain on his right hip or groin.



ALLERGIES:  None.



MEDICATIONS:  Reviewed.  Please see MRAD.



PAST MEDICAL HISTORY:  Significant for low back pain.



PAST SURGICAL HISTORY:  Lumbar surgery at .



FAMILY HISTORY:  Cardiac disease.



SOCIAL HISTORY:  He does smoke.  He does use alcohol.



REVIEW OF SYSTEMS:  Twelve-point review of systems negative except as per HPI.



PHYSICAL EXAMINATION:

GENERAL:  The patient is alert and oriented.  He is lying in bed.  Speech is

clear.

HEENT:  Head normocephalic, atraumatic.  Extraocular muscles are intact.

CARDIOVASCULAR:  Regular rate and rhythm.  No edema in his lower extremities.

LUNGS:  Respirations are unlabored with symmetric chest rise.

ABDOMEN:  Soft, nondistended.

EXTREMITIES:  Examination of bilateral lower extremities reveals decreased

sensation circumferentially in his left lower extremity.  EHL and FHL are 5/5

bilaterally.  Dorsiflexion and plantarflexion 5/5 bilaterally.  He has no pain

with log rolling at either hip.  He does have pain at end range of motion and

internal rotation on his left side.  He has approximately 20 degrees of internal

rotation on his left side and slightly more on his right side.  No pain on

his right side.  No tenderness around his hip.



IMAGING:  Two views of his left hip demonstrate findings consistent with early

AVN with collapse.



IMPRESSION:  Likely AVN, bilateral femoral heads.



PLAN:  We will get AP pelvis and images of right hip radiographically.  I would

like to get an MRI as he may be a candidate for cord decompression.  I will

review the MRI once it has been completed and discuss this with the patient.



Thank you for this consultation.

 



______________________________

KALIA COOPER MD



DR:  Marino  JOB#:  [id number] / [id number]

DD:  01/13/2017 09:26  DT:  01/13/2017 22:10

DELANEY

## 2017-01-13 NOTE — RAD
PROCEDURE 

MRI pelvis without contrast. 

 

HISTORY 

Avascular necrosis of the femoral heads. 

 

TECHNIQUE 

MRI of the pelvis was performed without intravenous contrast. 

 

FINDINGS 

There is subchondral sclerosis along the superior weightbearing portions 

of both femoral heads on the right greater than left. There is associated 

marrow edema within the right femoral head and neck. There is only minimal

edema on the left adjacent to the subchondral fracture line. There is 

minimal irregularity of both femoral articular surfaces at the involved 

portions without articular surface collapse. 

There are small osteophytes along both femoral heads. The hip joint spaces

are moderately narrowed on the right and mildly narrowed on the left 

consistent with moderate right and mild left osteoarthritis. Foci of 

subchondral edema are seen medially on the left and superiorly on the 

right acetabulum. 

Femoral head/neck offset is moderately decreased anteriorly on the right 

and mildly decreased on the left. There is no acetabular retroversion. 

There are bilateral degenerative acetabular labral tears without a 

paralabral cyst. 

The pubic symphysis and sacroiliac joints are unremarkable. There are 

postsurgical changes of lower lumbar fusion with laminectomies. Images of 

the pelvic organs are unremarkable. There is no muscular edema. There is 

no significant greater trochanteric or hamstring bursitis. 

 

IMPRESSION 

- Avascular necrosis of both femoral heads. A subchondral fracture line 

and subchondral sclerosis involves the majority of the weightbearing 

articular surface on the right greater the left. There is mild bilateral 

articular surface irregularity without gross collapse.

- Edema within the right femoral head and neck may reflect a component of 

residual ischemia, or a stress reaction from the aforementioned 

subchondral fracture.

- Moderate right and mild left hip osteoarthritis.

- Femoral head/neck morphology consistent with femoral acetabular 

impingement on the right greater the left. Degenerative acetabular labral 

tears bilaterally.

 

 

 

Electronically signed by: Orlando Chand (Jan 13, 2017 12:59:25)

## 2017-01-13 NOTE — RAD
Pelvis and right hip radiographs 



History:  Avascular necrosis. Pain.



Comparison:  Left hip radiograph 1/12/2017.



Findings:  



AP view of the pelvis including both hips. AP and frog-leg view of the right

hip fracture dislocation is identified. There is evidence of mild flattening

and sclerosis of right femoral head, compatible with avascular necrosis.

Sclerosis of left femoral head is also seen.



Impression:  

Avascular necrosis of bilateral femoral heads.

## 2017-01-14 VITALS — DIASTOLIC BLOOD PRESSURE: 69 MMHG | SYSTOLIC BLOOD PRESSURE: 142 MMHG

## 2017-01-14 VITALS — DIASTOLIC BLOOD PRESSURE: 72 MMHG | SYSTOLIC BLOOD PRESSURE: 117 MMHG

## 2017-01-14 VITALS — DIASTOLIC BLOOD PRESSURE: 72 MMHG | SYSTOLIC BLOOD PRESSURE: 127 MMHG

## 2017-01-14 RX ADMIN — BACLOFEN PRN MG: 10 TABLET ORAL at 04:16

## 2017-01-14 RX ADMIN — HYDROMORPHONE HYDROCHLORIDE PRN MG: 2 INJECTION INTRAMUSCULAR; INTRAVENOUS; SUBCUTANEOUS at 14:19

## 2017-01-14 RX ADMIN — LIDOCAINE SCH PATCH: 50 PATCH CUTANEOUS at 08:35

## 2017-01-14 RX ADMIN — OXYCODONE HYDROCHLORIDE AND ACETAMINOPHEN PRN TAB: 10; 325 TABLET ORAL at 08:35

## 2017-01-14 RX ADMIN — BACLOFEN PRN MG: 10 TABLET ORAL at 12:56

## 2017-01-14 RX ADMIN — MORPHINE SULFATE PRN MG: 2 INJECTION, SOLUTION INTRAMUSCULAR; INTRAVENOUS at 04:17

## 2017-01-14 RX ADMIN — BACLOFEN PRN MG: 10 TABLET ORAL at 08:34

## 2017-01-14 RX ADMIN — DOCUSATE SODIUM SCH MG: 100 CAPSULE, LIQUID FILLED ORAL at 08:35

## 2017-01-14 RX ADMIN — OXYCODONE HYDROCHLORIDE AND ACETAMINOPHEN PRN TAB: 10; 325 TABLET ORAL at 12:57

## 2017-01-14 RX ADMIN — HYDROMORPHONE HYDROCHLORIDE PRN MG: 2 INJECTION INTRAMUSCULAR; INTRAVENOUS; SUBCUTANEOUS at 10:03

## 2017-01-14 NOTE — DISCH
DISCHARGE INSTRUCTIONS


Condition on Discharge


Condition on Discharge:  Stable





Activity After Discharge


Activity Instructions for Disc:  Other, see below


Lifting Instructions after Dis:  No heavy lifting


Weight Bearing Status after Di:  As tolerated





Diet after Discharge


Diet after Discharge:  Regular





Contacting the DRIsrael after DC


Call your doctor for:  Concerns you may have





Follow-Up


Follow up with:  Alvin Wednesday for surgery





Treatment/Equipment after DC


Adaptive Equipment Issued:  None








TISH COOPER II, MD Jan 14, 2017 10:05

## 2017-01-14 NOTE — PDOC3
Discharge Summary


Visit Information


Date of Admission:  Jan 12, 2017


Date of Discharge:  Jan 14, 2017


Admitting Diagnosis Comment:


1, Atypical chest pain in an adult at rest - unlikely ACS , but admitted for 

rule out


2. SMoker


3 Avascular necrosis of bilateral hip


4. Lumbago with recent laminectomy


Final Diagnosis


 Problems


Medical Problems:


(1) Avascular necrosis of bones of both hips


Status: Acute  





(2) Chest pain


Status: Acute  





(3) Chest pain of uncertain etiology


Status: Acute  





(4) Hydrocele in adult


Status: Acute  





(5) Neck pain, acute


Status: Acute  











Brief Hospital Course


Allergies





 Allergies








Coded Allergies Type Severity Reaction Last Updated Verified


 


  No Known Drug Allergies    6/20/14 No








Vital Signs





 Vital Signs








  Date Time  Temp Pulse Resp B/P Pulse Ox O2 Delivery O2 Flow Rate FiO2


 


1/14/17 10:03   16  98 Room Air  


 


1/14/17 03:52 97.7 64  127/72    





 97.7       








Lab Results





Laboratory Tests








Test


  1/12/17


11:12 1/12/17


17:05 1/13/17


02:00


 


White Blood Count


  10.8x10^3/uL


(4.0-11.0) 


  10.0x10^3/uL


(4.0-11.0)


 


Red Blood Count


  4.43x10^6/uL


(4.30-5.70) 


  4.07x10^6/uL


(4.30-5.70)


 


Hemoglobin


  13.7g/dL


(13.0-17.5) 


  12.6g/dL


(13.0-17.5)


 


Hematocrit


  41.1%


(39.0-53.0) 


  38.9%


(39.0-53.0)


 


Mean Corpuscular Volume 93fL ()   96fL () 


 


Mean Corpuscular Hemoglobin 31pg (25-35)   31pg (25-35) 


 


Mean Corpuscular Hemoglobin


Concent 33g/dL (31-37) 


  


  32g/dL (31-37) 


 


 


Red Cell Distribution Width


  13.3%


(11.5-14.5) 


  13.6%


(11.5-14.5)


 


Platelet Count


  228x10^3/uL


(140-400) 


  202x10^3/uL


(140-400)


 


Neutrophils (%) (Auto) 82% (31-73)   76% (31-73) 


 


Lymphocytes (%) (Auto) 10% (24-48)   15% (24-48) 


 


Monocytes (%) (Auto) 6% (0-9)   7% (0-9) 


 


Eosinophils (%) (Auto) 2% (0-3)   3% (0-3) 


 


Basophils (%) (Auto) 0% (0-3)   0% (0-3) 


 


Neutrophils # (Auto)


  8.8x10^3uL


(1.8-7.7) 


  7.6x10^3uL


(1.8-7.7)


 


Lymphocytes # (Auto)


  1.1x10^3/uL


(1.0-4.8) 


  1.5x10^3/uL


(1.0-4.8)


 


Monocytes # (Auto)


  0.6x10^3/uL


(0.0-1.1) 


  0.7x10^3/uL


(0.0-1.1)


 


Eosinophils # (Auto)


  0.2x10^3/uL


(0.0-0.7) 


  0.3x10^3/uL


(0.0-0.7)


 


Basophils # (Auto)


  0.0x10^3/uL


(0.0-0.2) 


  0.0x10^3/uL


(0.0-0.2)


 


Prothrombin Time


  13.2SEC


(11.7-14.0) 


  


 


 


Prothromb Time International


Ratio 1.1 (0.8-1.1) 


  


  


 


 


Sodium Level


  142mmol/L


(136-145) 


  144mmol/L


(136-145)


 


Potassium Level


  3.6mmol/L


(3.5-5.1) 


  3.6mmol/L


(3.5-5.1)


 


Chloride Level


  108mmol/L


() 


  108mmol/L


()


 


Carbon Dioxide Level


  26mmol/L


(21-32) 


  27mmol/L


(21-32)


 


Anion Gap 8 (6-14)   9 (6-14) 


 


Blood Urea Nitrogen 6mg/dL (8-26)   9mg/dL (8-26) 


 


Creatinine


  0.8mg/dL


(0.7-1.3) 


  0.8mg/dL


(0.7-1.3)


 


Estimated GFR


(Cockcroft-Gault) 119.7 


  


  119.7 


 


 


BUN/Creatinine Ratio 8 (6-20)   11 (6-20) 


 


Glucose Level


  106mg/dL


(70-99) 


  101mg/dL


(70-99)


 


Calcium Level


  8.7mg/dL


(8.5-10.1) 


  8.5mg/dL


(8.5-10.1)


 


Total Bilirubin


  0.3mg/dL


(0.2-1.0) 


  0.5mg/dL


(0.2-1.0)


 


Aspartate Amino Transf


(AST/SGOT) 12U/L (15-37) 


  


  9U/L (15-37) 


 


 


Alanine Aminotransferase


(ALT/SGPT) 18U/L (16-63) 


  


  15U/L (16-63) 


 


 


Alkaline Phosphatase 76U/L ()   65U/L () 


 


Creatine Kinase


  148U/L


() 


  


 


 


Creatine Kinase MB (Mass)


  0.8ng/mL


(0.0-3.6) 


  


 


 


Creatine Kinase MB Relative


Index 0.5% (0-4) 


  


  


 


 


Troponin I Quantitative


  < 0.017ng/mL


(0.000-0.055) < 0.017ng/mL


(0.000-0.055) < 0.017ng/mL


(0.000-0.055)


 


NT-Pro-B-Type Natriuretic


Peptide 29pg/mL


(0-124) 


  


 


 


Total Protein


  7.0g/dL


(6.4-8.2) 


  6.3g/dL


(6.4-8.2)


 


Albumin


  3.3g/dL


(3.4-5.0) 


  2.9g/dL


(3.4-5.0)


 


Albumin/Globulin Ratio 0.9 (1.0-1.7)   0.9 (1.0-1.7) 


 


Amylase Level


  261U/L


() 


  


 


 


Lipase


  457U/L


() 


  


 


 


Triglycerides Level


  


  


  56mg/dL


(0-150)


 


Cholesterol Level


  


  


  125mg/dL


(0-200)


 


LDL Cholesterol, Calculated


  


  


  72mg/dL


(0-100)


 


VLDL Cholesterol, Calculated   11mg/dL (0-40) 


 


HDL Cholesterol


  


  


  42mg/dL


(40-60)


 


Cholesterol/HDL Ratio   3.0 








Brief Hospital Course


Mr. Hernandez  is a 61 old AA male admitted for chest pain which was non cardiac

, echo normal,. BUt had bilateral hip pains, and on xray and mRI shows 

avascular necrosis of both hips. Ortho consulted. Planning on outpt sx wednesday

, in the meantime, will dc on PO dilaudid, pt used to narcs as just had 

kyphoplasty at  2 weeks ago





Dc time 30 mins including earlier rounds, marcie RN at bedside, provided copies of 

results to pt





Pt seen and examined





Discharge Information


Condition at Discharge:  Improved, Stable


Follow Up:  Weeks (Dr. Esquivel on wed)


Disposition/Orders:  D/C to Home


Scheduled


Ondansetron (Zofran Odt) 1 TAB SL Q8HRS 





Scheduled PRN


Baclofen (Baclofen) 1 TAB PO PRN QID PRN PRN MUSCLE SPASMS (Reported) 


Hydrocodone Bit/Acetaminophen (Hydrocodone-Apap 5-325  **) 1 TAB PO PRN Q6HRS 

PRN PRN PAIN 





Miscellaneous Medications


Diazepam (Diazepam) 10 MG PO (Reported) 


Oxycodone/Apap  (Percocet  Mg Tablet) 1 EACH PO (Reported) 


Trazodone Hcl (Trazodone Hcl) 100 MG PO (Reported) 








GRACE CEJA MD Jan 14, 2017 10:21

## 2017-01-14 NOTE — PDOC
ORTHO PROGRESS NOTES


Subjective


L hip/groin pain unchanged.


Vitals





 Vital Signs








  Date Time  Temp Pulse Resp B/P Pulse Ox O2 Delivery O2 Flow Rate FiO2


 


1/14/17 10:03   16  98 Room Air  


 


1/14/17 03:52 97.7 64  127/72    





 97.7       








Labs





Laboratory Tests








Test


  1/12/17


11:12 1/12/17


17:05 1/13/17


02:00


 


White Blood Count


  10.8x10^3/uL


(4.0-11.0) 


  10.0x10^3/uL


(4.0-11.0)


 


Red Blood Count


  4.43x10^6/uL


(4.30-5.70) 


  4.07x10^6/uL


(4.30-5.70)


 


Hemoglobin


  13.7g/dL


(13.0-17.5) 


  12.6g/dL


(13.0-17.5)


 


Hematocrit


  41.1%


(39.0-53.0) 


  38.9%


(39.0-53.0)


 


Mean Corpuscular Volume 93fL ()   96fL () 


 


Mean Corpuscular Hemoglobin 31pg (25-35)   31pg (25-35) 


 


Mean Corpuscular Hemoglobin


Concent 33g/dL (31-37) 


  


  32g/dL (31-37) 


 


 


Red Cell Distribution Width


  13.3%


(11.5-14.5) 


  13.6%


(11.5-14.5)


 


Platelet Count


  228x10^3/uL


(140-400) 


  202x10^3/uL


(140-400)


 


Neutrophils (%) (Auto) 82% (31-73)   76% (31-73) 


 


Lymphocytes (%) (Auto) 10% (24-48)   15% (24-48) 


 


Monocytes (%) (Auto) 6% (0-9)   7% (0-9) 


 


Eosinophils (%) (Auto) 2% (0-3)   3% (0-3) 


 


Basophils (%) (Auto) 0% (0-3)   0% (0-3) 


 


Neutrophils # (Auto)


  8.8x10^3uL


(1.8-7.7) 


  7.6x10^3uL


(1.8-7.7)


 


Lymphocytes # (Auto)


  1.1x10^3/uL


(1.0-4.8) 


  1.5x10^3/uL


(1.0-4.8)


 


Monocytes # (Auto)


  0.6x10^3/uL


(0.0-1.1) 


  0.7x10^3/uL


(0.0-1.1)


 


Eosinophils # (Auto)


  0.2x10^3/uL


(0.0-0.7) 


  0.3x10^3/uL


(0.0-0.7)


 


Basophils # (Auto)


  0.0x10^3/uL


(0.0-0.2) 


  0.0x10^3/uL


(0.0-0.2)


 


Prothrombin Time


  13.2SEC


(11.7-14.0) 


  


 


 


Prothromb Time International


Ratio 1.1 (0.8-1.1) 


  


  


 


 


Sodium Level


  142mmol/L


(136-145) 


  144mmol/L


(136-145)


 


Potassium Level


  3.6mmol/L


(3.5-5.1) 


  3.6mmol/L


(3.5-5.1)


 


Chloride Level


  108mmol/L


() 


  108mmol/L


()


 


Carbon Dioxide Level


  26mmol/L


(21-32) 


  27mmol/L


(21-32)


 


Anion Gap 8 (6-14)   9 (6-14) 


 


Blood Urea Nitrogen 6mg/dL (8-26)   9mg/dL (8-26) 


 


Creatinine


  0.8mg/dL


(0.7-1.3) 


  0.8mg/dL


(0.7-1.3)


 


Estimated GFR


(Cockcroft-Gault) 119.7 


  


  119.7 


 


 


BUN/Creatinine Ratio 8 (6-20)   11 (6-20) 


 


Glucose Level


  106mg/dL


(70-99) 


  101mg/dL


(70-99)


 


Calcium Level


  8.7mg/dL


(8.5-10.1) 


  8.5mg/dL


(8.5-10.1)


 


Total Bilirubin


  0.3mg/dL


(0.2-1.0) 


  0.5mg/dL


(0.2-1.0)


 


Aspartate Amino Transf


(AST/SGOT) 12U/L (15-37) 


  


  9U/L (15-37) 


 


 


Alanine Aminotransferase


(ALT/SGPT) 18U/L (16-63) 


  


  15U/L (16-63) 


 


 


Alkaline Phosphatase 76U/L ()   65U/L () 


 


Creatine Kinase


  148U/L


() 


  


 


 


Creatine Kinase MB (Mass)


  0.8ng/mL


(0.0-3.6) 


  


 


 


Creatine Kinase MB Relative


Index 0.5% (0-4) 


  


  


 


 


Troponin I Quantitative


  < 0.017ng/mL


(0.000-0.055) < 0.017ng/mL


(0.000-0.055) < 0.017ng/mL


(0.000-0.055)


 


NT-Pro-B-Type Natriuretic


Peptide 29pg/mL


(0-124) 


  


 


 


Total Protein


  7.0g/dL


(6.4-8.2) 


  6.3g/dL


(6.4-8.2)


 


Albumin


  3.3g/dL


(3.4-5.0) 


  2.9g/dL


(3.4-5.0)


 


Albumin/Globulin Ratio 0.9 (1.0-1.7)   0.9 (1.0-1.7) 


 


Amylase Level


  261U/L


() 


  


 


 


Lipase


  457U/L


() 


  


 


 


Triglycerides Level


  


  


  56mg/dL


(0-150)


 


Cholesterol Level


  


  


  125mg/dL


(0-200)


 


LDL Cholesterol, Calculated


  


  


  72mg/dL


(0-100)


 


VLDL Cholesterol, Calculated   11mg/dL (0-40) 


 


HDL Cholesterol


  


  


  42mg/dL


(40-60)


 


Cholesterol/HDL Ratio   3.0 








X-Rays


MRI bilateral AVN


Notes


A and A


some irritation with logrolling of LLE


EHL/FHL 5/5


toes warm


Assessment and Plan


core decompression Wednesday


ok to go home today








TISH COOPER II, MD Jan 14, 2017 10:07

## 2017-01-18 ENCOUNTER — HOSPITAL ENCOUNTER (OUTPATIENT)
Dept: HOSPITAL 61 - SURG | Age: 62
Setting detail: OBSERVATION
LOS: 1 days | Discharge: HOME | End: 2017-01-19
Attending: ORTHOPAEDIC SURGERY | Admitting: ORTHOPAEDIC SURGERY
Payer: MEDICARE

## 2017-01-18 VITALS — DIASTOLIC BLOOD PRESSURE: 80 MMHG | SYSTOLIC BLOOD PRESSURE: 152 MMHG

## 2017-01-18 VITALS — DIASTOLIC BLOOD PRESSURE: 75 MMHG | SYSTOLIC BLOOD PRESSURE: 124 MMHG

## 2017-01-18 VITALS — HEIGHT: 67 IN | WEIGHT: 184 LBS | BODY MASS INDEX: 28.88 KG/M2

## 2017-01-18 VITALS — DIASTOLIC BLOOD PRESSURE: 43 MMHG | SYSTOLIC BLOOD PRESSURE: 106 MMHG

## 2017-01-18 VITALS — SYSTOLIC BLOOD PRESSURE: 132 MMHG | DIASTOLIC BLOOD PRESSURE: 74 MMHG

## 2017-01-18 VITALS — SYSTOLIC BLOOD PRESSURE: 143 MMHG | DIASTOLIC BLOOD PRESSURE: 79 MMHG

## 2017-01-18 VITALS — DIASTOLIC BLOOD PRESSURE: 74 MMHG | SYSTOLIC BLOOD PRESSURE: 148 MMHG

## 2017-01-18 VITALS — DIASTOLIC BLOOD PRESSURE: 78 MMHG | SYSTOLIC BLOOD PRESSURE: 148 MMHG

## 2017-01-18 VITALS — DIASTOLIC BLOOD PRESSURE: 72 MMHG | SYSTOLIC BLOOD PRESSURE: 129 MMHG

## 2017-01-18 VITALS — DIASTOLIC BLOOD PRESSURE: 64 MMHG | SYSTOLIC BLOOD PRESSURE: 127 MMHG

## 2017-01-18 VITALS — DIASTOLIC BLOOD PRESSURE: 77 MMHG | SYSTOLIC BLOOD PRESSURE: 138 MMHG

## 2017-01-18 VITALS — DIASTOLIC BLOOD PRESSURE: 76 MMHG | SYSTOLIC BLOOD PRESSURE: 160 MMHG

## 2017-01-18 DIAGNOSIS — M87.852: Primary | ICD-10-CM

## 2017-01-18 PROCEDURE — 97116 GAIT TRAINING THERAPY: CPT

## 2017-01-18 PROCEDURE — 27299 UNLISTED PX PELVIS/HIP JOINT: CPT

## 2017-01-18 PROCEDURE — 76000 FLUOROSCOPY <1 HR PHYS/QHP: CPT

## 2017-01-18 PROCEDURE — 96376 TX/PRO/DX INJ SAME DRUG ADON: CPT

## 2017-01-18 PROCEDURE — 96375 TX/PRO/DX INJ NEW DRUG ADDON: CPT

## 2017-01-18 PROCEDURE — G0378 HOSPITAL OBSERVATION PER HR: HCPCS

## 2017-01-18 PROCEDURE — C1713 ANCHOR/SCREW BN/BN,TIS/BN: HCPCS

## 2017-01-18 PROCEDURE — 97161 PT EVAL LOW COMPLEX 20 MIN: CPT

## 2017-01-18 PROCEDURE — 96365 THER/PROPH/DIAG IV INF INIT: CPT

## 2017-01-18 PROCEDURE — G0379 DIRECT REFER HOSPITAL OBSERV: HCPCS

## 2017-01-18 PROCEDURE — 97166 OT EVAL MOD COMPLEX 45 MIN: CPT

## 2017-01-18 PROCEDURE — 97530 THERAPEUTIC ACTIVITIES: CPT

## 2017-01-18 RX ADMIN — HYDROMORPHONE HYDROCHLORIDE SCH MG: 2 TABLET ORAL at 13:15

## 2017-01-18 RX ADMIN — HYDROMORPHONE HYDROCHLORIDE SCH MG: 2 TABLET ORAL at 21:10

## 2017-01-18 RX ADMIN — OXYCODONE HYDROCHLORIDE PRN MG: 5 TABLET ORAL at 18:16

## 2017-01-18 RX ADMIN — DIAZEPAM PRN MG: 5 TABLET ORAL at 10:59

## 2017-01-18 RX ADMIN — OXYCODONE HYDROCHLORIDE AND ACETAMINOPHEN PRN TAB: 10; 325 TABLET ORAL at 10:59

## 2017-01-18 RX ADMIN — DIAZEPAM PRN MG: 5 TABLET ORAL at 18:16

## 2017-01-18 RX ADMIN — CELECOXIB SCH MG: 200 CAPSULE ORAL at 21:10

## 2017-01-18 RX ADMIN — OXYCODONE HYDROCHLORIDE AND ACETAMINOPHEN PRN TAB: 10; 325 TABLET ORAL at 15:29

## 2017-01-18 RX ADMIN — FERROUS SULFATE TAB 325 MG (65 MG ELEMENTAL FE) SCH MG: 325 (65 FE) TAB at 18:16

## 2017-01-18 RX ADMIN — CEFAZOLIN SODIUM SCH MLS/HR: 2 SOLUTION INTRAVENOUS at 13:18

## 2017-01-18 RX ADMIN — OXYCODONE HYDROCHLORIDE PRN MG: 5 TABLET ORAL at 22:34

## 2017-01-18 RX ADMIN — CEFAZOLIN SODIUM SCH MLS/HR: 2 SOLUTION INTRAVENOUS at 21:07

## 2017-01-18 NOTE — PDOC
BRIEF OPERATIVE NOTE


Date:  Jan 18, 2017


Pre-Op Diagnosis


L hip AVN


Post-Op Diagnosis


same


Procedure Performed


L hip core decompression


Surgeon


Alvin


Anesthesiologist


McNitt


Blood Loss


5mL


Complications


none








TISH COOPER II, MD Jan 18, 2017 07:59

## 2017-01-18 NOTE — OP
DATE OF SURGERY:  01/18/2017



SURGEON:  Kalia Cooper MD



ASSISTANT:  None.



PREOPERATIVE DIAGNOSIS:  Bilateral, symptomatic on the left, hip avascular

necrosis.



POSTOPERATIVE DIAGNOSIS:  Bilateral, symptomatic on the left, hip avascular

necrosis.



PROCEDURE PERFORMED:  Left hip core decompression with three passes of a 3.2 mm

guide pin.



BLOOD LOSS:  5 mL.



COMPLICATIONS:  None.



REASON FOR PROCEDURE:  This is a 61-year-old gentleman who I was asked to see in

consultation for his left-sided groin pain.  The pain began approximately 1-2

weeks ago and was atraumatic in onset.  He did have an episode of right hip pain

several years ago that he can recall.  No complaints of his right hip currently.

 Radiographs and MRI revealed avascular necrosis at his hips without collapse. 

The lesions were sclerotic, however.  Nonetheless, I did discuss the possibility

of core decompression and its rationale as well as the risks, benefits,

alternatives and he wished to proceed with surgery.



DESCRIPTION OF PROCEDURE:  The patient was greeted in the preoperative area by

myself.  Correct extremity was marked and verified.  He was taken to the

operative suite and antibiotics started en route.  Once in the OR, he had

successful induction of general anesthesia, we then transferred him gently

supine to the fracture table, placed a perineal post, slid him down into

position and secured his right leg in a well leg sanchez, left leg in a traction

ski boot.  I performed some internal rotation in the line of the femoral neck

perpendicular to the peroneal post.  C-arm was brought in to make sure we could

obtain adequate images and then we proceeded to prep and drape of the left lower

extremity and hip in usual sterile fashion with Ioban at the periphery.  I then

used C-arm to localize a trajectory in the superior aspect of his femoral neck

on an AP as well as my starting point lateral to the proximal aspect of the

lesser trochanter.  I then performed a stab incision over this area and palpated

with the tip of my guide pin for the anterior, posterior dimensions of the femur

and then when in the middle, drilled perpendicular to the bone at the center of

these points.  I then used C-arm and careful drilling under AP and lateral

imaging to guide three passes of my guidewire into the avascular lesion.  After

this, I withdrew the guidewire, irrigated out the small stab incision I had made

and closed skin with simple interrupted 2-0 nylon.  The area was cleansed and

dried and a sterile occlusive dressing was applied.  We then took the patient

down from the fracture table and removed the post and then transferred gently

supine to the recovery room cart.  He was taken to PACU in stable and extubated

condition.  Postop plan is partial weightbearing left lower extremity.  He will

receive PT and OT and possible discharge later today versus tomorrow morning. 

He will receive antibiotic prophylaxis.

 



______________________________

KALIA COOPER MD



DR:  STACEY/alvin  JOB#:  [id number] / [id number]

DD:  01/18/2017 08:45  DT:  01/18/2017 09:26

DELANEY

## 2017-01-19 VITALS — SYSTOLIC BLOOD PRESSURE: 147 MMHG | DIASTOLIC BLOOD PRESSURE: 70 MMHG

## 2017-01-19 VITALS — DIASTOLIC BLOOD PRESSURE: 82 MMHG | SYSTOLIC BLOOD PRESSURE: 136 MMHG

## 2017-01-19 VITALS — DIASTOLIC BLOOD PRESSURE: 82 MMHG | SYSTOLIC BLOOD PRESSURE: 135 MMHG

## 2017-01-19 RX ADMIN — CELECOXIB SCH MG: 200 CAPSULE ORAL at 09:34

## 2017-01-19 RX ADMIN — HYDROMORPHONE HYDROCHLORIDE SCH MG: 2 TABLET ORAL at 09:34

## 2017-01-19 RX ADMIN — OXYCODONE HYDROCHLORIDE PRN MG: 5 TABLET ORAL at 09:35

## 2017-01-19 RX ADMIN — FERROUS SULFATE TAB 325 MG (65 MG ELEMENTAL FE) SCH MG: 325 (65 FE) TAB at 09:34

## 2017-01-19 RX ADMIN — OXYCODONE HYDROCHLORIDE PRN MG: 5 TABLET ORAL at 03:14

## 2017-01-19 RX ADMIN — DIAZEPAM PRN MG: 5 TABLET ORAL at 03:14

## 2017-01-19 RX ADMIN — CEFAZOLIN SODIUM SCH MLS/HR: 2 SOLUTION INTRAVENOUS at 03:06

## 2017-01-19 NOTE — PDOC
ORTHO PROGRESS NOTES


Subjective


"the pressure in my hip is gone" Doing ok, pain tolerable, has been non-

compliant with PWB, per nursing. No CP, SOB, abd complaints


Vitals





 Vital Signs








  Date Time  Temp Pulse Resp B/P Pulse Ox O2 Delivery O2 Flow Rate FiO2


 


1/19/17 04:15   16   Room Air  


 


1/19/17 03:49 98.1 69  147/70 94   





 98.1       


 


1/18/17 08:47       10 








Notes


A and A


in bed


incision c/d/i


LLE: wiggles toes, toes warm


Assessment and Plan


ok to D/C


f/u in 2wks


PWB LLE


PT prior to D/C








TISH COOPER II, MD Jan 19, 2017 08:34

## 2017-01-19 NOTE — DISCH
DISCHARGE INSTRUCTIONS


Condition on Discharge


Condition on Discharge:  Stable





Activity After Discharge


Activity Instructions for Disc:  Other, see below


Other activity instructions:  crutches


Bathing Instructions:  Shower-keep dressing dry


Lifting Instructions after Dis:  No heavy lifting


Weight Bearing Status after Di:  Partial weight bearing





Diet after Discharge


Diet after Discharge:  Regular





Wound Incision Care


Wound/Incision Care:  Ice to area for comfort, Keep wound/cast CDI, Change 

dressing





Contacting the DR. after DC


Call your doctor for:  Concerns you may have





Follow-Up


Follow up with:  Alvin in 2wks








TISH COOPER II, MD Jan 19, 2017 08:31

## 2017-03-14 ENCOUNTER — HOSPITAL ENCOUNTER (EMERGENCY)
Dept: HOSPITAL 61 - ER | Age: 62
Discharge: HOME | End: 2017-03-14
Payer: MEDICARE

## 2017-03-14 VITALS — BODY MASS INDEX: 28.25 KG/M2 | WEIGHT: 180 LBS | HEIGHT: 67 IN

## 2017-03-14 VITALS — SYSTOLIC BLOOD PRESSURE: 142 MMHG | DIASTOLIC BLOOD PRESSURE: 81 MMHG

## 2017-03-14 DIAGNOSIS — G89.29: ICD-10-CM

## 2017-03-14 DIAGNOSIS — R07.9: Primary | ICD-10-CM

## 2017-03-14 DIAGNOSIS — I10: ICD-10-CM

## 2017-03-14 DIAGNOSIS — F12.10: ICD-10-CM

## 2017-03-14 LAB
ALBUMIN SERPL-MCNC: 3.7 G/DL (ref 3.4–5)
ALP SERPL-CCNC: 77 U/L (ref 46–116)
ALT SERPL-CCNC: 21 U/L (ref 16–63)
ANION GAP SERPL CALC-SCNC: 9 MMOL/L (ref 6–14)
AST SERPL-CCNC: 18 U/L (ref 15–37)
BASOPHILS # BLD AUTO: 0 X10^3/UL (ref 0–0.2)
BASOPHILS NFR BLD: 1 % (ref 0–3)
BILIRUB DIRECT SERPL-MCNC: < 0.1 MG/DL (ref 0–0.2)
BILIRUB SERPL-MCNC: 0.3 MG/DL (ref 0.2–1)
BUN SERPL-MCNC: 11 MG/DL (ref 8–26)
CALCIUM SERPL-MCNC: 9.3 MG/DL (ref 8.5–10.1)
CHLORIDE SERPL-SCNC: 106 MMOL/L (ref 98–107)
CO2 SERPL-SCNC: 30 MMOL/L (ref 21–32)
CREAT SERPL-MCNC: 1 MG/DL (ref 0.7–1.3)
EOSINOPHIL NFR BLD: 3 % (ref 0–3)
ERYTHROCYTE [DISTWIDTH] IN BLOOD BY AUTOMATED COUNT: 13.8 % (ref 11.5–14.5)
GFR SERPLBLD BASED ON 1.73 SQ M-ARVRAT: 92.5 ML/MIN
GLUCOSE SERPL-MCNC: 92 MG/DL (ref 70–99)
HCT VFR BLD CALC: 45.2 % (ref 39–53)
HGB BLD-MCNC: 14.8 G/DL (ref 13–17.5)
LYMPHOCYTES # BLD: 1.5 X10^3/UL (ref 1–4.8)
LYMPHOCYTES NFR BLD AUTO: 29 % (ref 24–48)
MCH RBC QN AUTO: 30 PG (ref 25–35)
MCHC RBC AUTO-ENTMCNC: 33 G/DL (ref 31–37)
MCV RBC AUTO: 93 FL (ref 79–100)
MONOCYTES NFR BLD: 6 % (ref 0–9)
NEUTROPHILS NFR BLD AUTO: 61 % (ref 31–73)
PLATELET # BLD AUTO: 221 X10^3/UL (ref 140–400)
POTASSIUM SERPL-SCNC: 3.8 MMOL/L (ref 3.5–5.1)
PROT SERPL-MCNC: 7.4 G/DL (ref 6.4–8.2)
RBC # BLD AUTO: 4.86 X10^6/UL (ref 4.3–5.7)
SODIUM SERPL-SCNC: 145 MMOL/L (ref 136–145)
WBC # BLD AUTO: 5.2 X10^3/UL (ref 4–11)

## 2017-03-14 PROCEDURE — 83690 ASSAY OF LIPASE: CPT

## 2017-03-14 PROCEDURE — 83880 ASSAY OF NATRIURETIC PEPTIDE: CPT

## 2017-03-14 PROCEDURE — 93005 ELECTROCARDIOGRAM TRACING: CPT

## 2017-03-14 PROCEDURE — 85027 COMPLETE CBC AUTOMATED: CPT

## 2017-03-14 PROCEDURE — 36415 COLL VENOUS BLD VENIPUNCTURE: CPT

## 2017-03-14 PROCEDURE — 84484 ASSAY OF TROPONIN QUANT: CPT

## 2017-03-14 PROCEDURE — 80048 BASIC METABOLIC PNL TOTAL CA: CPT

## 2017-03-14 PROCEDURE — 71010: CPT

## 2017-03-14 PROCEDURE — 80076 HEPATIC FUNCTION PANEL: CPT

## 2017-03-14 NOTE — EKG
[facility]

               [address] [place] [address]

Test Date:    2017               Test Time:    12:00:11

Pat Name:     OSMIN BARRAZA           Department:   

Patient ID:   [id number]           Room:          

Gender:       M                        Technician:   

:          [id number]               Requested By: ANITA KEANE

Order Number: [id number]            Reading MD:   Alexandra Green

                                 Measurements

Intervals                              Axis          

Rate:         77                       P:            6

WY:           156                      QRS:          110

QRSD:         106                      T:            33

QT:           354                                    

QTc:          402                                    

                           Interpretive Statements

SINUS RHYTHM.

MISSING LEAD V4.

OTHERWISE NORMAL EKG

Electronically Signed On 3- 21:19:53 CDT by Alexandra Green

## 2017-03-14 NOTE — RAD
Portable chest, 3/14/2017:



History: Chest and back pain



Comparison is made to a study from 1/12/2017. The heart size and pulmonary

vascularity are normal. There is a calcified granuloma in the left base. No

acute infiltrates are seen. There is no evidence of pleural fluid.



IMPRESSION: No acute cardiopulmonary abnormality is detected.

## 2017-03-14 NOTE — PHYS DOC
Past Medical History


Past Medical History:  Other


Additional Past Medical Histor:  chronic back pain


Past Surgical History:  Lumbar Laminectomy, Other


Additional Past Surgical Histo:  back surgery- hardware, inguinal hernia


Alcohol Use:  Rarely


Drug Use:  Marijuana





Adult General


Chief Complaint


Chief Complaint:  CHEST PAIN





HPI


HPI


61-year-old male presenting to the emergency department today with chest pain 

that started last night around 8 PM. He describes it as sharp worse with deep 

breaths and worse when pressing on the chest. He also has mild shortness of 

breath intermittently. He denies any other symptoms. He denies nausea vomiting. 

He denies diabetes he admits to smoking. He denies having chronic hypertension 

however does have mild hypertension here. He denies high cholesterol. He denies 

family history of heart disease. He denies hemoptysis unilateral leg swelling 

personal or family history of blood clotting disorder. He denies recent 

immobilization.





Review of systems is negative for diaphoresis nausea vomiting abdominal pain 

diarrhea fevers or chills. He denies cough. All other review of systems is 

negative unless otherwise noted in history of present illness.





Review of Systems


Review of Systems


SEE ABOVE.





Current Medications


Current Medications





 Current Medications








 Medications


  (Trade)  Dose


 Ordered  Sig/Andrew  Start Time


 Stop Time Status Last Admin


Dose Admin


 


 Aspirin


  ([facility]


 Aspirin)  324 mg  1X  ONCE  3/14/17 12:15


 3/14/17 12:16 DC 3/14/17 12:24


324 MG











Allergies


Allergies





 Allergies








Coded Allergies Type Severity Reaction Last Updated Verified


 


  No Known Drug Allergies    3/14/17 No











Physical Exam


Physical Exam





Constitutional: Well developed, well nourished, no acute distress, non-toxic 

appearance. 


HENT: Normocephalic, atraumatic, bilateral external ears normal, oropharynx 

moist, no oral exudates, nose normal. []


Eyes: PERRLA, EOMI, conjunctiva normal, no discharge. [] 


Neck: Normal range of motion, no tenderness, supple, no stridor.  


Cardiovascular:Heart rate regular rhythm, no murmur. Pain with palpation of the 

chest wall.


Lungs & Thorax:  Bilateral breath sounds clear to auscultation 


Abdomen: Bowel sounds normal, soft, no tenderness, no masses, no pulsatile 

masses. [] 


Skin: Warm, dry, no erythema, no rash.  


Back: No tenderness, no CVA tenderness. [] 


Extremities: No tenderness, no cyanosis, no clubbing, ROM intact, no edema. [] 


Neurologic: Alert and oriented X 3, normal motor function, normal sensory 

function, no focal deficits noted. 


Psychologic: Affect normal, judgement normal, mood normal. []





Current Patient Data


Vital Signs





 Vital Signs








  Date Time  Temp Pulse Resp B/P Pulse Ox O2 Delivery O2 Flow Rate FiO2


 


3/14/17 12:00 97.5 73 18 142/81 98 Room Air  





 97.5       








Lab Values





 Laboratory Tests








Test


  3/14/17


11:58


 


White Blood Count


  5.2x10^3/uL


(4.0-11.0)


 


Red Blood Count


  4.86x10^6/uL


(4.30-5.70)


 


Hemoglobin


  14.8g/dL


(13.0-17.5)


 


Hematocrit


  45.2%


(39.0-53.0)


 


Mean Corpuscular Volume 93fL ()  


 


Mean Corpuscular Hemoglobin 30pg (25-35)  


 


Mean Corpuscular Hemoglobin


Concent 33g/dL (31-37)


 


 


Red Cell Distribution Width


  13.8%


(11.5-14.5)


 


Platelet Count


  221x10^3/uL


(140-400)


 


Neutrophils (%) (Auto) 61% (31-73)  


 


Lymphocytes (%) (Auto) 29% (24-48)  


 


Monocytes (%) (Auto) 6% (0-9)  


 


Eosinophils (%) (Auto) 3% (0-3)  


 


Basophils (%) (Auto) 1% (0-3)  


 


Neutrophils # (Auto)


  3.2x10^3uL


(1.8-7.7)


 


Lymphocytes # (Auto)


  1.5x10^3/uL


(1.0-4.8)


 


Monocytes # (Auto)


  0.3x10^3/uL


(0.0-1.1)


 


Eosinophils # (Auto)


  0.2x10^3/uL


(0.0-0.7)


 


Basophils # (Auto)


  0.0x10^3/uL


(0.0-0.2)


 


Sodium Level


  145mmol/L


(136-145)


 


Potassium Level


  3.8mmol/L


(3.5-5.1)


 


Chloride Level


  106mmol/L


()


 


Carbon Dioxide Level


  30mmol/L


(21-32)


 


Anion Gap 9 (6-14)  


 


Blood Urea Nitrogen


  11mg/dL (8-26)


 


 


Creatinine


  1.0mg/dL


(0.7-1.3)


 


Estimated GFR


(Cockcroft-Gault) 92.5  


 


 


Glucose Level


  92mg/dL


(70-99)


 


Calcium Level


  9.3mg/dL


(8.5-10.1)


 


Total Bilirubin


  0.3mg/dL


(0.2-1.0)


 


Direct Bilirubin


  < 0.1mg/dL


(0.0-0.2)


 


Aspartate Amino Transferase


(AST) 18U/L (15-37)  


 


 


Alanine Aminotransferase (ALT) 21U/L (16-63)  


 


Alkaline Phosphatase


  77U/L ()


 


 


Troponin I Quantitative


  < 0.017ng/mL


(0.000-0.055)


 


NT-Pro-B-Type Natriuretic


Peptide 31pg/mL


(0-124)


 


Total Protein


  7.4g/dL


(6.4-8.2)


 


Albumin


  3.7g/dL


(3.4-5.0)


 


Lipase


  239U/L


()





 Laboratory Tests


3/14/17 11:58








 Laboratory Tests


3/14/17 11:58














EKG


EKG


EKG shows mild repolarization in lead V3 and V4. Sinus rhythm with a regular 

rate. Axis is leftward. no reciprical depression present. ST segment concavity 

upwards. []





Radiology/Procedures


Radiology/Procedures


[]





Course & Med Decision Making


Course & Med Decision Making


Pertinent Labs and Imaging studies reviewed. (See chart for details)





[] 61-year-old gentleman presenting to the emergency Department chest pain for 

16 hours. Vital signs afebrile with normal heart rate. Mild hypertension 

present. Pertinent Physical exam showed tenderness to the sternum. EKG shows 

mild repolarization. Blood work obtained. Chest x-ray unremarkable. Patient was 

then discharged home to follow up with our cardiology team, Dr. Cooper over 

the next 24-48 hours for further evaluation workup and care. Heart score 3. 

Calculated for age, 1-2 risk factors, slightly, nonspecific EKG, troponin 

negative.





Dragon Disclaimer


Dragon Disclaimer


This electronic medical record was generated, in whole or in part, using a 

voice recognition dictation system.





Departure


Departure


Impression:  


 Primary Impression:  


 Chest pain


Disposition:  01 HOME, SELF-CARE


Referrals:  


NO PCP (PCP)








KAVON LAUGHLIN MD, PRASHANTH S MD


follow up in 2-3 days.


Patient Instructions:  Chest Pain (Nonspecific)





Additional Instructions:


Thank you for allowing us to participate in your care today.





Followup with your primary care physician in 3 days if your symptoms do not 

improve. If you do not have a primary care provider you can ask for a list of 

our primary care providers. Return to the emergency department you have any new 

or concerning findings.





This should be evaluated by the primary care physician and any necessary 

consulting services for continued management within a few days after discharge. 

Return to emergency room if you have any  new or concerning symptoms including 

but not limited to fever, chills, nausea, vomiting, intractable pain, any new 

rashes, chest pain, shortness of air, uncontrolled bleeding, difficulty 

breathing, and/or vision loss.


Scripts


Aspirin 81 Mg Tab.[email or web address] Tab PO DAILY #14 TAB  Ref 0


   Prov:ANTIA KEANE MD         3/14/17








ANITA KEANE MD Mar 14, 2017 12:31

## 2017-05-17 ENCOUNTER — HOSPITAL ENCOUNTER (OUTPATIENT)
Dept: HOSPITAL 61 - SURG | Age: 62
Discharge: HOME | End: 2017-05-17
Attending: ORTHOPAEDIC SURGERY
Payer: MEDICARE

## 2017-05-17 VITALS — HEIGHT: 67 IN | BODY MASS INDEX: 28.25 KG/M2 | WEIGHT: 180 LBS

## 2017-05-17 VITALS
DIASTOLIC BLOOD PRESSURE: 82 MMHG | SYSTOLIC BLOOD PRESSURE: 146 MMHG | DIASTOLIC BLOOD PRESSURE: 82 MMHG | SYSTOLIC BLOOD PRESSURE: 146 MMHG | DIASTOLIC BLOOD PRESSURE: 82 MMHG | SYSTOLIC BLOOD PRESSURE: 146 MMHG

## 2017-05-17 DIAGNOSIS — M87.852: Primary | ICD-10-CM

## 2017-05-17 DIAGNOSIS — Z87.39: ICD-10-CM

## 2017-05-17 DIAGNOSIS — Z72.89: ICD-10-CM

## 2017-05-17 DIAGNOSIS — Z86.69: ICD-10-CM

## 2017-05-17 DIAGNOSIS — M87.851: ICD-10-CM

## 2017-05-17 DIAGNOSIS — F17.200: ICD-10-CM

## 2017-05-17 PROCEDURE — C1887 CATHETER, GUIDING: HCPCS

## 2017-05-17 PROCEDURE — C1713 ANCHOR/SCREW BN/BN,TIS/BN: HCPCS

## 2017-05-17 PROCEDURE — 27299 UNLISTED PX PELVIS/HIP JOINT: CPT

## 2017-05-17 PROCEDURE — 76000 FLUOROSCOPY <1 HR PHYS/QHP: CPT

## 2017-05-17 PROCEDURE — A4215 STERILE NEEDLE: HCPCS

## 2017-05-17 RX ADMIN — FENTANYL CITRATE PRN MCG: 50 INJECTION INTRAMUSCULAR; INTRAVENOUS at 10:58

## 2017-05-17 RX ADMIN — FENTANYL CITRATE PRN MCG: 50 INJECTION INTRAMUSCULAR; INTRAVENOUS at 11:16

## 2017-05-17 NOTE — DISCH
DISCHARGE INSTRUCTIONS


Condition on Discharge


Condition on Discharge:  Stable





Activity After Discharge


Activity Instructions for Disc:  Other, see below


Bathing Instructions:  Shower-keep dressing dry


Weight Bearing Status after Di:  Touch down weight bearing





Diet after Discharge


Diet after Discharge:  Regular





Wound Incision Care


Wound/Incision Care:  Ice to area for comfort, Keep wound/cast CDI, Change 

dressing





Contacting the DRIsrael after DC


Call your doctor for:  Concerns you may have





Follow-Up


Follow up with:  Hawa or Alvin in 2wks











TISH COOPER II, MD May 17, 2017 07:41

## 2017-05-17 NOTE — PDOC
BRIEF OPERATIVE NOTE


Date:  May 17, 2017


Pre-Op Diagnosis


R hip AVN


Post-Op Diagnosis


same


Procedure Performed


R hip core decompression


Surgeon


Alvin


Anesthesia Type:  General, Local


Blood Loss


10mL


Complications


none











TISH COOPER II, MD May 17, 2017 07:42

## 2017-05-19 NOTE — OP
DATE OF SURGERY:  05/17/2017



SURGEON:  Kalia Cooper MD



ASSISTANT:  None.



PREOPERATIVE DIAGNOSIS:  Bilateral hip avascular necrosis.



POSTOPERATIVE DIAGNOSIS:  Bilateral hip avascular necrosis.



PROCEDURE PERFORMED:  Core decompression of right hip using a 3.2-mm guidewire.



COMPLICATIONS:  None.



ESTIMATED BLOOD LOSS:  5 mL.



REASON FOR PROCEDURE:  The patient is a very pleasant 61-year-old with bilateral

hip AVN.  We had previously performed a core decompression on his left side,

which helped his pain.  During his followup, he had been complaining of more

right-sided groin pain.  His MRI was reviewed, he had a little bit more advanced

disease, and I told him that we cannot guarantee the same outcome as we had on

his left side, but that proceeding is not totally unreasonable on the right

side.  After discussing the risks, benefits, and alternatives, he elected to

proceed with the above surgery on his right hip.



DESCRIPTION OF PROCEDURE:  The patient was greeted in the preoperative area,

where the correct site was marked.  He was taken back to the operative suite and

antibiotics were started en route.  Once in the OR, he had successful induction

of general anesthesia and then we transferred him to the fracture table and

secured him to the bed with all pressure points padded.  Peroneal padded post 
was used,

but no traction was used during this case.  We then proceeded to prep and drape

the right lower extremity and hip in our usual sterile fashion and then we

conducted a standard preoperative timeout.  I brought in C-arm to obtain a good

trajectory into the lesion, as well as marking a spot from my skin incision 
adjacent

to the lesser trochanter.  I then made a stab incision in his lateral thigh in

line with his lesser trochanter and then, I palpated with the guide pin for the

anterior and posterior margin of the proximal femur and I then used biplanar

fluoroscopy to advance the guide pin for three passes in 2 different areas

through the lesion.  After this, through the guide pin. I injected local

anesthetic around the incision.  I then closed the skin incision with a single

simple interrupted nylon suture.  Steri-Strips and sterile dressings were then

applied.  He tolerated the surgery well.  At the conclusion of the surgery, he

was awakened from anesthesia, transferred gently supine to the recovery room

cart and taken to PACU in stable and extubated condition.  Postop plan is for

him to be toe-touch weightbearing on his right lower extremity, and he will

follow up with me in 2 weeks, sooner should problems arise.  We will discharge

him home today.

 



______________________________

KALIA COOPER MD



DR:  STACEY/alvin  JOB#:  [id number] / [id number]

DD:  05/19/2017 10:57  DT:  05/19/2017 15:15

DELANEY

## 2017-11-20 ENCOUNTER — HOSPITAL ENCOUNTER (OUTPATIENT)
Dept: HOSPITAL 61 - SURGPAT | Age: 62
Discharge: HOME | End: 2017-11-20
Attending: ORTHOPAEDIC SURGERY
Payer: MEDICARE

## 2017-11-20 DIAGNOSIS — Z01.818: Primary | ICD-10-CM

## 2017-11-20 DIAGNOSIS — J84.10: ICD-10-CM

## 2017-11-20 DIAGNOSIS — Z72.0: ICD-10-CM

## 2017-11-20 DIAGNOSIS — I45.10: ICD-10-CM

## 2017-11-20 DIAGNOSIS — Z96.641: ICD-10-CM

## 2017-11-20 LAB
ALBUMIN SERPL-MCNC: 3.7 G/DL (ref 3.4–5)
ANION GAP SERPL CALC-SCNC: 6 MMOL/L (ref 6–14)
APTT BLD: 33 SEC (ref 24–38)
BACTERIA #/AREA URNS HPF: (no result) /HPF
BASOPHILS # BLD AUTO: 0 X10^3/UL (ref 0–0.2)
BASOPHILS NFR BLD: 0 % (ref 0–3)
BILIRUB UR QL STRIP: NEGATIVE
BUN SERPL-MCNC: 14 MG/DL (ref 8–26)
CALCIUM SERPL-MCNC: 9.2 MG/DL (ref 8.5–10.1)
CHLORIDE SERPL-SCNC: 104 MMOL/L (ref 98–107)
CO2 SERPL-SCNC: 30 MMOL/L (ref 21–32)
CREAT SERPL-MCNC: 1.1 MG/DL (ref 0.7–1.3)
EOSINOPHIL NFR BLD: 4 % (ref 0–3)
ERYTHROCYTE [DISTWIDTH] IN BLOOD BY AUTOMATED COUNT: 13.8 % (ref 11.5–14.5)
GFR SERPLBLD BASED ON 1.73 SQ M-ARVRAT: 82.6 ML/MIN
GLUCOSE SERPL-MCNC: 89 MG/DL (ref 70–99)
GLUCOSE UR STRIP-MCNC: NEGATIVE MG/DL
HCT VFR BLD CALC: 49 % (ref 39–53)
HGB BLD-MCNC: 16.1 G/DL (ref 13–17.5)
INR PPP: 1 (ref 0.8–1.1)
LYMPHOCYTES # BLD: 1.5 X10^3/UL (ref 1–4.8)
LYMPHOCYTES NFR BLD AUTO: 31 % (ref 24–48)
MCH RBC QN AUTO: 31 PG (ref 25–35)
MCHC RBC AUTO-ENTMCNC: 33 G/DL (ref 31–37)
MCV RBC AUTO: 95 FL (ref 79–100)
MONOCYTES NFR BLD: 10 % (ref 0–9)
NEUTROPHILS NFR BLD AUTO: 54 % (ref 31–73)
NITRITE UR QL STRIP: NEGATIVE
PH UR STRIP: 6 [PH]
PLATELET # BLD AUTO: 212 X10^3/UL (ref 140–400)
POTASSIUM SERPL-SCNC: 3.9 MMOL/L (ref 3.5–5.1)
PROT UR STRIP-MCNC: NEGATIVE MG/DL
PROTHROMBIN TIME: 12.5 SEC (ref 11.7–14)
RBC # BLD AUTO: 5.17 X10^6/UL (ref 4.3–5.7)
RBC #/AREA URNS HPF: 0 /HPF (ref 0–2)
SODIUM SERPL-SCNC: 140 MMOL/L (ref 136–145)
SP GR UR STRIP: 1.02
SQUAMOUS #/AREA URNS LPF: (no result) /LPF
UROBILINOGEN UR-MCNC: 0.2 MG/DL
WBC # BLD AUTO: 4.7 X10^3/UL (ref 4–11)
WBC #/AREA URNS HPF: (no result) /HPF (ref 0–4)

## 2017-11-20 PROCEDURE — 93005 ELECTROCARDIOGRAM TRACING: CPT

## 2017-11-20 PROCEDURE — 71020: CPT

## 2017-11-20 PROCEDURE — 36415 COLL VENOUS BLD VENIPUNCTURE: CPT

## 2017-11-20 PROCEDURE — 85730 THROMBOPLASTIN TIME PARTIAL: CPT

## 2017-11-20 PROCEDURE — 82040 ASSAY OF SERUM ALBUMIN: CPT

## 2017-11-20 PROCEDURE — 80048 BASIC METABOLIC PNL TOTAL CA: CPT

## 2017-11-20 PROCEDURE — 85651 RBC SED RATE NONAUTOMATED: CPT

## 2017-11-20 PROCEDURE — 81001 URINALYSIS AUTO W/SCOPE: CPT

## 2017-11-20 PROCEDURE — 85610 PROTHROMBIN TIME: CPT

## 2017-11-20 PROCEDURE — 85025 COMPLETE CBC W/AUTO DIFF WBC: CPT

## 2017-11-20 PROCEDURE — 87641 MR-STAPH DNA AMP PROBE: CPT

## 2017-11-20 NOTE — RAD
Indication: Preop for hip replacement surgery.



Time of exam 1313 hours.



Correlation is made with prior study from 3/14/2017.



Calcified granuloma left lung base is stable. The heart size is normal. The

lungs are clear. The pulmonary vascularity is normal. No infiltrate, effusion

or pneumothorax is detected.



Impression: No acute cardiopulmonary process is detected.

## 2017-11-20 NOTE — EKG
[facility]

              [address] [place] [address]

Test Date:    2017               Test Time:    12:47:46

Pat Name:     OSMIN BARRAZA           Department:   

Patient ID:   [id number]           Room:          

Gender:       M                        Technician:   AT

:          1955               Requested By: TISH COOPER

Order Number: [id number]            Reading MD:   Terrence Cooper MD

                                 Measurements

Intervals                              Axis          

Rate:         69                       P:            16

AL:           164                      QRS:          118

QRSD:         106                      T:            37

QT:           376                                    

QTc:          404                                    

                           Interpretive Statements

SINUS RHYTHM

RBBB



Electronically Signed On 2017 11:38:27 CST by Terrence Cooper MD

## 2017-12-01 ENCOUNTER — HOSPITAL ENCOUNTER (OUTPATIENT)
Dept: HOSPITAL 61 - NM | Age: 62
Discharge: HOME | End: 2017-12-01
Attending: INTERNAL MEDICINE
Payer: MEDICARE

## 2017-12-01 DIAGNOSIS — Z01.818: Primary | ICD-10-CM

## 2017-12-01 PROCEDURE — A9500 TC99M SESTAMIBI: HCPCS

## 2017-12-01 PROCEDURE — 96375 TX/PRO/DX INJ NEW DRUG ADDON: CPT

## 2017-12-01 PROCEDURE — 96376 TX/PRO/DX INJ SAME DRUG ADON: CPT

## 2017-12-01 PROCEDURE — 96374 THER/PROPH/DIAG INJ IV PUSH: CPT

## 2017-12-01 PROCEDURE — 78452 HT MUSCLE IMAGE SPECT MULT: CPT

## 2017-12-01 PROCEDURE — 93017 CV STRESS TEST TRACING ONLY: CPT

## 2017-12-01 NOTE — RAD
--------------- APPROVED REPORT --------------





Test Type:          Pharmacological

Stress Nurse/Tech: Siria Rosenbaum R.N.

Test Indications: pre-op hip replacement

Cardiac History: smoker- occaisional cigar.

Medications:     See Electronic Medical Record

Medical History: See Electronic Medical Record

Resting ECG:     SR

Resting Heart Rate: 58 bpm

Resting Blood Pressure: 133/70mmHg

Pretest Chest Pain: None



Nurse/Tech Notes

S1S2, lungs CTA

Consent: The procedure was explained to the patient in lay terms. Informed consent was witnessed. Randal
eout was entered into GLO. History and Stress Test performed by RT He (R) (N)



Pharm. Details

Pharmacologic stress testing was performed using 0.4mg per 5ml of regadenoson given intravenously ove
r 7-10 seconds.



Stress Symptoms

slight SOB and queasiness for about 1-2 minutes. notified mango LOU[place] about abnormal ST elevation at 1
120. will have  view test results before allowing pt to go home after resting images.



POST EXERCISE

Reason for Termination: Infusion complete

Max HR: 81 bpm

Max Blood Pressure: 148/76mmHg

Blood Pressure response to exercise: Normal blood pressure response during stress.

Heart Rate response to exercise: wnl

Chest Pain: No. 

Arrhythmia: No. 

ST Change: No. no changes from abnormal baseline levels noted above



INTERPRETATION

Stress EKG Conclusion: No evidence of stress induced EKG changes. 



Imaging Protocol

IMAGE PROTOCOL: Rest Tc-99m/stress Tc-99m 1 day



Rest:            Stress:         Viability:   

Radiopharm.Tc99m IujnywngxDa56n Sestamibi

Dose11.3mCi            32mCi            

Duration    15min.           10min.           

Img Date  12/01/2017 12/01/2017      

Inj-Img Grtc29ohm.           60min.           



Rest Admin Site:IV - Right HandAdministrator:JHONATAN Mireles

Stress Admin Site: IV - Right HandAdministrator: BECKY Pearce, ARRT (R)(N)



STRESS DATA

End Diast. Vol.127.0mlAv. Heart Rate69.0bpm

End Syst. Vol.35.0mlCO Index BSA0.0L/min

Myocardial Bfsg442.0gEject. Dgvxuxfs95.0%



Stress Rates

Pk. Fill Rate2.65EDV/secLVtime Pk. Fill 181.44msec

Pk. Empty Rate3.92ESV/secLVtime Pk. Uvvws459.08msec

1/3 Pk. Fill1.62EDV/sec



Stress Scores

Regional WT0.00Summed WT0.00

Regional WM0.00Summed WM0.00



The rest and stress images show normal perfusion, normal contraction and thickening.



LV Perf. Quant

17 Seg. SSS0.00

17 Seg. SRS0.00

17 Seg. SDS0.00

Stress Defect Extent (% LAD)0.00Rest Defect Extent (% LAD)0.00Rev. Defect Extent (% LAD)0.00

Stress Defect Extent (% LCX) 0.00Rest Defect Extent (% LCX)0.00Rev. Defect Extent (% LCX)0.00

Stress Defect Extent (% RCA)0.00Rest Defect Extent (% RCA)0.00Rev. Defect Extent (% RCA)0.00

Stress Defect Extent (% CASPER)0.00Rest Defect Extent (% CASPER)0.00Rev. Defect Extent (% CASPER)0.00



Other Information

Quality:Good

Risk Assessment: Low Risk



Conclusion

1. No evidence of stress induced EKG changes. Baseline J-point elevation stable throughout vasodilato
r infusion.

2. Normal perfusion at stress/rest. EF 60%

3. Low risk study.

## 2017-12-11 ENCOUNTER — HOSPITAL ENCOUNTER (EMERGENCY)
Dept: HOSPITAL 61 - ER | Age: 62
Discharge: HOME | End: 2017-12-11
Payer: MEDICARE

## 2017-12-11 VITALS — SYSTOLIC BLOOD PRESSURE: 126 MMHG | DIASTOLIC BLOOD PRESSURE: 69 MMHG

## 2017-12-11 VITALS — HEIGHT: 67 IN | WEIGHT: 182 LBS | BODY MASS INDEX: 28.56 KG/M2

## 2017-12-11 DIAGNOSIS — M25.561: Primary | ICD-10-CM
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
## 2017-12-11 DIAGNOSIS — Z96.649: ICD-10-CM
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
## 2017-12-11 DIAGNOSIS — G89.29: ICD-10-CM
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
## 2017-12-11 PROCEDURE — 99284 EMERGENCY DEPT VISIT MOD MDM: CPT

## 2017-12-11 PROCEDURE — 73564 X-RAY EXAM KNEE 4 OR MORE: CPT

## 2017-12-11 NOTE — RAD
Indication: Pain after a fall today.



Technique: Right knee series contains 4 images. No comparison is available.



Findings: There is no fracture or dislocation. There is no joint effusion.

There may be minimal prepatellar soft tissue swelling.



Impression: Negative for fracture.

## 2017-12-11 NOTE — PHYS DOC
Past Medical History


Past Medical History:  Other


Additional Past Medical Histor:  chronic back pain


Past Surgical History:  Hip Replacement, Lumbar Laminectomy, Other


Additional Past Surgical Histo:  back surgery- hardware, inguinal hernia


Alcohol Use:  None


Drug Use:  None





Adult General


Chief Complaint


Chief Complaint:  KNEE INJURY





[facility]


HPI





Patient is a 62  year old male who presents with right knee pain after having a 

fall today. The patient is recovering from hip surgery and is supposed to be 

going to [facility] for rehabilitation. When he fell today he was 

instructed to go to the emergency department and have the knee checked out 

before he presented to [facility]. He took oxycodone for his post 

surgical pain with his last dose being last night. He denies any other injury.





Review of Systems


Review of Systems





Constitutional: Denies fever or chills []


Respiratory: Denies cough or shortness of breath []


Cardiovascular: No additional information not addressed in HPI []


Musculoskeletal: see history of present illness


Integument: Denies rash or skin lesions []


Neurologic: Denies headache, focal weakness or sensory changes []


Endocrine: Denies polyuria or polydipsia []





All other systems were reviewed and found to be within normal limits, except as 

documented in this note.





Current Medications


Current Medications





Current Medications








 Medications


  (Trade)  Dose


 Ordered  Sig/Andrew  Start Time


 Stop Time Status Last Admin


Dose Admin


 


 Oxycodone/


 Acetaminophen


  (Percocet 5/325)  1 tab  1X  ONCE  12/11/17 10:30


 12/11/17 10:31 DC 12/11/17 10:31


1 TAB











Allergies


Allergies





Allergies








Coded Allergies Type Severity Reaction Last Updated Verified


 


  No Known Drug Allergies    12/4/17 No











Physical Exam


Physical Exam





Constitutional: Well developed, well nourished, no acute distress, non-toxic 

appearance. []


Cardiovascular:Heart rate regular rhythm, no murmur []


Lungs & Thorax:  Bilateral breath sounds clear to auscultation []


Skin: Warm, dry, no erythema, no rash. [] 


Extremities: Generalized tenderness to the right knee with palpation, there is 

no erythema, ecchymosis or decreased range of motion noted


Neurologic: Alert and oriented X 3, normal motor function, normal sensory 

function, no focal deficits noted. []


Psychologic: Affect normal, judgement normal, mood normal. []





Current Patient Data


Vital Signs





 Vital Signs








  Date Time  Temp Pulse Resp B/P (MAP) Pulse Ox O2 Delivery O2 Flow Rate FiO2


 


12/11/17 09:54 97.7 70 22 130/71 (90) 98 Room Air  





 97.7       











EKG


EKG


[]





Radiology/Procedures


Radiology/Procedures


[]





Course & Med Decision Making


Course & Med Decision Making


Pertinent Labs and Imaging studies reviewed. (See chart for details)





[]1. Knee pain





An Ace wrap has been applied to your knee. Please continue with your admission 

to [facility] placed to continue rehabilitation on your hip. You may continue 

to take your home medications.





Dragon Disclaimer


Dragon Disclaimer


This electronic medical record was generated, in whole or in part, using a 

voice recognition dictation system.





Departure


Departure


Impression:  


 Primary Impression:  


 Knee pain


Disposition:  01 HOME, SELF-CARE


Condition:  STABLE


Patient Instructions:  Knee Pain, Easy-to-Read





Additional Instructions:  


Follow-up with your primary care provider in 3 days if not improving or return 

to the ED if worsening.











MACARIO SAINZ Dec 11, 2017 11:07

## 2017-12-29 ENCOUNTER — HOSPITAL ENCOUNTER (EMERGENCY)
Dept: HOSPITAL 61 - ER | Age: 62
Discharge: HOME | End: 2017-12-29
Payer: MEDICARE

## 2017-12-29 DIAGNOSIS — Z96.641: ICD-10-CM
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
## 2017-12-29 DIAGNOSIS — S80.01XA: ICD-10-CM

## 2017-12-29 DIAGNOSIS — G89.29: ICD-10-CM
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
## 2017-12-29 DIAGNOSIS — S80.02XA: Primary | ICD-10-CM
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
## 2017-12-29 DIAGNOSIS — R05: ICD-10-CM
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
## 2017-12-29 PROCEDURE — 73560 X-RAY EXAM OF KNEE 1 OR 2: CPT

## 2017-12-29 PROCEDURE — 72170 X-RAY EXAM OF PELVIS: CPT

## 2017-12-29 PROCEDURE — 99284 EMERGENCY DEPT VISIT MOD MDM: CPT

## 2017-12-29 PROCEDURE — 71010: CPT

## 2017-12-29 RX ADMIN — OXYCODONE HYDROCHLORIDE AND ACETAMINOPHEN 1 TAB: 7.5; 325 TABLET ORAL at 13:50

## 2018-04-24 ENCOUNTER — HOSPITAL ENCOUNTER (OUTPATIENT)
Dept: HOSPITAL 61 - KCIC | Age: 63
Discharge: HOME | End: 2018-04-24
Attending: THORACIC SURGERY (CARDIOTHORACIC VASCULAR SURGERY)
Payer: MEDICARE

## 2018-04-24 DIAGNOSIS — S49.92XA: Primary | ICD-10-CM
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
## 2018-04-24 PROCEDURE — 73030 X-RAY EXAM OF SHOULDER: CPT

## 2018-07-16 ENCOUNTER — HOSPITAL ENCOUNTER (OUTPATIENT)
Dept: HOSPITAL 61 - RAD | Age: 63
Discharge: HOME | End: 2018-07-16
Attending: ORTHOPAEDIC SURGERY
Payer: MEDICARE

## 2018-07-16 DIAGNOSIS — Z84.1: ICD-10-CM

## 2018-07-16 DIAGNOSIS — M19.012: ICD-10-CM

## 2018-07-16 DIAGNOSIS — M75.112: ICD-10-CM

## 2018-07-16 DIAGNOSIS — M75.22: ICD-10-CM

## 2018-07-16 DIAGNOSIS — Z98.890: ICD-10-CM

## 2018-07-16 DIAGNOSIS — M25.552: Primary | ICD-10-CM

## 2018-07-16 DIAGNOSIS — Z72.89: ICD-10-CM

## 2018-07-16 DIAGNOSIS — Z87.891: ICD-10-CM

## 2018-07-16 DIAGNOSIS — F41.9: ICD-10-CM

## 2018-07-16 PROCEDURE — 73221 MRI JOINT UPR EXTREM W/O DYE: CPT

## 2018-07-16 PROCEDURE — 77002 NEEDLE LOCALIZATION BY XRAY: CPT

## 2018-07-16 PROCEDURE — 20610 DRAIN/INJ JOINT/BURSA W/O US: CPT

## 2018-07-16 RX ADMIN — METHYLPREDNISOLONE ACETATE 1 MG: 80 INJECTION, SUSPENSION INTRA-ARTICULAR; INTRALESIONAL; INTRAMUSCULAR; SOFT TISSUE at 14:31

## 2018-07-16 RX ADMIN — LIDOCAINE HYDROCHLORIDE 1 ML: 10 INJECTION, SOLUTION INFILTRATION; PERINEURAL at 14:30

## 2018-07-16 RX ADMIN — IOHEXOL 1 ML: 300 INJECTION, SOLUTION INTRAVENOUS at 14:31

## 2018-07-16 RX ADMIN — BUPIVACAINE HYDROCHLORIDE 1 ML: 5 INJECTION, SOLUTION PERINEURAL at 14:31

## 2018-07-24 ENCOUNTER — HOSPITAL ENCOUNTER (EMERGENCY)
Dept: HOSPITAL 61 - ER | Age: 63
Discharge: HOME | End: 2018-07-24
Payer: MEDICARE

## 2018-07-24 VITALS
DIASTOLIC BLOOD PRESSURE: 84 MMHG | DIASTOLIC BLOOD PRESSURE: 84 MMHG | SYSTOLIC BLOOD PRESSURE: 150 MMHG | SYSTOLIC BLOOD PRESSURE: 150 MMHG

## 2018-07-24 DIAGNOSIS — M25.552: ICD-10-CM

## 2018-07-24 DIAGNOSIS — Z98.890: ICD-10-CM

## 2018-07-24 DIAGNOSIS — G89.29: Primary | ICD-10-CM

## 2018-07-24 DIAGNOSIS — M54.5: ICD-10-CM

## 2018-07-24 DIAGNOSIS — Z96.649: ICD-10-CM

## 2018-07-24 PROCEDURE — 99284 EMERGENCY DEPT VISIT MOD MDM: CPT

## 2018-07-24 PROCEDURE — 96372 THER/PROPH/DIAG INJ SC/IM: CPT

## 2018-07-24 RX ADMIN — LIDOCAINE 1 PATCH: 50 PATCH CUTANEOUS at 07:40

## 2018-07-24 RX ADMIN — ORPHENADRINE CITRATE 1 MG: 60 INJECTION INTRAMUSCULAR; INTRAVENOUS at 07:39

## 2018-07-24 RX ADMIN — KETOROLAC TROMETHAMINE 1 MG: 30 INJECTION, SOLUTION INTRAMUSCULAR at 07:35

## 2018-07-24 RX ADMIN — OXYCODONE HYDROCHLORIDE AND ACETAMINOPHEN 1 TAB: 5; 325 TABLET ORAL at 07:28

## 2018-09-18 ENCOUNTER — HOSPITAL ENCOUNTER (OUTPATIENT)
Dept: HOSPITAL 61 - MRI | Age: 63
Discharge: HOME | End: 2018-09-18
Payer: MEDICARE

## 2018-09-18 DIAGNOSIS — M76.892: ICD-10-CM

## 2018-09-18 DIAGNOSIS — M51.36: ICD-10-CM

## 2018-09-18 DIAGNOSIS — Z84.1: ICD-10-CM

## 2018-09-18 DIAGNOSIS — M48.061: Primary | ICD-10-CM

## 2018-09-18 DIAGNOSIS — Z82.49: ICD-10-CM

## 2018-09-18 DIAGNOSIS — M19.012: ICD-10-CM

## 2018-09-18 DIAGNOSIS — M25.78: ICD-10-CM

## 2018-09-18 DIAGNOSIS — Z87.891: ICD-10-CM

## 2018-09-18 DIAGNOSIS — Z96.641: ICD-10-CM

## 2018-09-18 DIAGNOSIS — M87.852: ICD-10-CM

## 2018-09-18 DIAGNOSIS — Z87.39: ICD-10-CM

## 2018-09-18 DIAGNOSIS — M16.7: ICD-10-CM

## 2018-09-18 PROCEDURE — 72158 MRI LUMBAR SPINE W/O & W/DYE: CPT

## 2018-09-18 PROCEDURE — 73723 MRI JOINT LWR EXTR W/O&W/DYE: CPT

## 2018-09-18 PROCEDURE — A9585 GADOBUTROL INJECTION: HCPCS

## 2018-09-18 NOTE — RAD
MRI Lumbar Spine without and with contrast

 

History: Chronic low back pain, necrosis left hip 

 

Technique: Multiplanar, multi sequential pre and postcontrast MR imaging 

was performed of the lumbar spine.

 

Contrast: 7.5 cc Gadavist

 

Comparison: November 30, 2016

 

Findings: 

There is some motion degradation There again has been posterolateral 

fusion L3, L4, L5, S1 at which there are bilateral pedicle screws attached

to vertical rods. Exam does not accurately evaluate integrity of hardware.

There is persistent nonspecific posterior paraspinous soft tissue edema, 

previously seen fluid collection posterior soft tissues decreased in size,

small residual fluid collection on the left at the L4 level about 2.3 cm 

longitudinal by 0.6 cm AP by 1.6 cm transverse. There is no new 

significant marrow edema, some artifact created by hardware. There is 

again multilevel narrowing of the intervertebral disc spaces greatest at 

L5-S1 and L4-5 and to lesser degree at L3-4 and L2-3. L2-3 degenerative 

disc disease has progressed in the interval. Conus terminates at L1-L2. 

Vertebral body stature is preserved. There is negligible posterior 

subluxation L2 relative L3. There is no nodular enhancement of the conus 

or cauda equina. There is no significant enhancement in the intervertebral

disc spaces.

 

L2-L3:  There is disc osteophyte complex and bulge. There is posterior 

annular tear. There is mild to moderate buckling of the ligamentum flavum 

and mild facet hypertrophic change. There is increased overall mild spinal

stenosis, lateral recess stenosis bilaterally. There is mild left and 

mild-to-moderate right neural foramina compromise greater than previously.

 

L3-L4:  There again has been left laminectomy. Spinal canal is overall 

adequate. Left neural foramen is adequate, minimal narrowing of the right 

neural foramen.

 

L4-L5:  There again has been posterior decompression, spinal canal are 

adequate. Neural foramina are somewhat poorly visualized due to artifact 

from hardware, probable mild-to-moderate narrowing on the left and very 

mild narrowing on the right.

 

L5-S1:  Spinal canal is adequate. Neural foramina are somewhat poorly 

characterized due to motion. There is probable mild neural foramina 

compromise bilaterally.

 

Impression: 

 

1.  There again has been posterolateral fusion L3-S1, also posterior 

decompression L4-5 and left laminectomy L3-4. Previously seen fluid 

collection in the posterior soft tissues has decreased in size. There is 

increased mild spinal stenosis at L2-3, degenerative disc disease at this 

level progressed in the interval. There also increased right greater than 

left L2-3 neural foramina compromise. There is multilevel lumbar 

degenerative disc disease.

 

Electronically signed by: Awais Taveras MD (9/18/2018 4:52 PM) 

[facility]

## 2018-09-18 NOTE — RAD
EXAM: MRI LEFT HIP with and without IV contrast

 

DATE: 9/18/2018 3:30 PM

 

CLINICAL INDICATION: Left femoral head osteonecrosis

 

COMPARISON: MRI pelvis 1/13/2017, radiograph 6/28/2018

 

TECHNIQUE: Multiplanar multisequence MR imaging of the left hip was 

performed before and after the administration of IV contrast.

 

FINDINGS:

No left hip joint effusion. No significant trochanteric bursal edema.

 

Serpentine T1 hypointense signal within the left femoral head is grossly 

stable in size and configuration when compared to prior MRI 1/13/2017, 

consistent with osteonecrosis. No definite collapse is seen. Minimal 

associated left femoral head edema is noted, unchanged. Associated 

degenerative changes of the left hip is seen with degenerative cystic 

change within the left femoral head and left acetabulum most prominent 

anteriorly.Chondral thinning is seen superiorly.

 

Evaluation of the labrum is limited on this nonarthrographic study. Within

these constraints, no discrete labral tear is identified.

 

The tendinous attachments of the gluteus medius, minimus, and rectus 

femoris are grossly unremarkable. Mild increased signal and thickening at 

the left hamstring attachment suggesting mild tendinosis. No discrete tear

is identified.

 

Seble-articular soft tissues: Negative periarticular mass lesion or focal 

muscular atrophy.

 

Limited survey of pelvis: Limited survey of the visceral contents of the 

pelvis within normal limits.

 

IMPRESSION: 

1.  Left femoral head osteonecrosis is again seen, grossly stable in 

extent however there is mildly progressed secondary left hip joint 

osteoarthritis with subchondral cystic change and chondral thinning

2.  Mild left hamstring tendinosis.

 

Electronically signed by: Omid Desai MD (9/18/2018 5:21 PM) [facility]

## 2018-10-10 ENCOUNTER — HOSPITAL ENCOUNTER (OUTPATIENT)
Dept: HOSPITAL 61 - SURGPAT | Age: 63
Discharge: HOME | End: 2018-10-10
Attending: ORTHOPAEDIC SURGERY
Payer: MEDICARE

## 2018-10-10 DIAGNOSIS — F17.200: ICD-10-CM

## 2018-10-10 DIAGNOSIS — F41.9: ICD-10-CM

## 2018-10-10 DIAGNOSIS — Z72.89: ICD-10-CM

## 2018-10-10 DIAGNOSIS — Z01.818: Primary | ICD-10-CM

## 2018-10-10 DIAGNOSIS — J98.4: ICD-10-CM

## 2018-10-10 LAB
ALBUMIN SERPL-MCNC: 4.2 G/DL (ref 3.4–5)
ANION GAP SERPL CALC-SCNC: 8 MMOL/L (ref 6–14)
APTT BLD: 31 SEC (ref 24–38)
APTT PPP: YELLOW S
BACTERIA #/AREA URNS HPF: (no result) /HPF
BASOPHILS # BLD AUTO: 0 X10^3/UL (ref 0–0.2)
BASOPHILS NFR BLD: 1 % (ref 0–3)
BILIRUB UR QL STRIP: NEGATIVE
BUN SERPL-MCNC: 12 MG/DL (ref 8–26)
CALCIUM SERPL-MCNC: 9.2 MG/DL (ref 8.5–10.1)
CHLORIDE SERPL-SCNC: 106 MMOL/L (ref 98–107)
CO2 SERPL-SCNC: 29 MMOL/L (ref 21–32)
CREAT SERPL-MCNC: 1 MG/DL (ref 0.7–1.3)
EOSINOPHIL NFR BLD: 0.1 X10^3/UL (ref 0–0.7)
EOSINOPHIL NFR BLD: 1 % (ref 0–3)
ERYTHROCYTE [DISTWIDTH] IN BLOOD BY AUTOMATED COUNT: 13.4 % (ref 11.5–14.5)
FIBRINOGEN PPP-MCNC: CLEAR MG/DL
GFR SERPLBLD BASED ON 1.73 SQ M-ARVRAT: 91.9 ML/MIN
GLUCOSE SERPL-MCNC: 101 MG/DL (ref 70–99)
HCT VFR BLD CALC: 48.2 % (ref 39–53)
HGB BLD-MCNC: 16.3 G/DL (ref 13–17.5)
LYMPHOCYTES # BLD: 1.2 X10^3/UL (ref 1–4.8)
LYMPHOCYTES NFR BLD AUTO: 18 % (ref 24–48)
MCH RBC QN AUTO: 32 PG (ref 25–35)
MCHC RBC AUTO-ENTMCNC: 34 G/DL (ref 31–37)
MCV RBC AUTO: 93 FL (ref 79–100)
MONO #: 0.3 X10^3/UL (ref 0–1.1)
MONOCYTES NFR BLD: 5 % (ref 0–9)
NEUT #: 5 X10^3UL (ref 1.8–7.7)
NEUTROPHILS NFR BLD AUTO: 75 % (ref 31–73)
NITRITE UR QL STRIP: NEGATIVE
PH UR STRIP: 5 [PH]
PLATELET # BLD AUTO: 235 X10^3/UL (ref 140–400)
POTASSIUM SERPL-SCNC: 3.8 MMOL/L (ref 3.5–5.1)
PROT UR STRIP-MCNC: NEGATIVE MG/DL
PROTHROMBIN TIME: 12.6 SEC (ref 11.7–14)
RBC # BLD AUTO: 5.17 X10^6/UL (ref 4.3–5.7)
RBC #/AREA URNS HPF: (no result) /HPF (ref 0–2)
SODIUM SERPL-SCNC: 143 MMOL/L (ref 136–145)
SQUAMOUS #/AREA URNS LPF: (no result) /LPF
UROBILINOGEN UR-MCNC: 0.2 MG/DL
WBC # BLD AUTO: 6.7 X10^3/UL (ref 4–11)
WBC #/AREA URNS HPF: (no result) /HPF (ref 0–4)

## 2018-10-10 PROCEDURE — 81001 URINALYSIS AUTO W/SCOPE: CPT

## 2018-10-10 PROCEDURE — 71046 X-RAY EXAM CHEST 2 VIEWS: CPT

## 2018-10-10 PROCEDURE — 80048 BASIC METABOLIC PNL TOTAL CA: CPT

## 2018-10-10 PROCEDURE — 85025 COMPLETE CBC W/AUTO DIFF WBC: CPT

## 2018-10-10 PROCEDURE — 85610 PROTHROMBIN TIME: CPT

## 2018-10-10 PROCEDURE — 85730 THROMBOPLASTIN TIME PARTIAL: CPT

## 2018-10-10 PROCEDURE — 87641 MR-STAPH DNA AMP PROBE: CPT

## 2018-10-10 PROCEDURE — 82040 ASSAY OF SERUM ALBUMIN: CPT

## 2018-10-10 PROCEDURE — 36415 COLL VENOUS BLD VENIPUNCTURE: CPT

## 2018-10-10 PROCEDURE — 85651 RBC SED RATE NONAUTOMATED: CPT

## 2019-01-01 NOTE — RAD
AP and Lateral Views of the Chest  10/10/2018 11:38 AM

 

Indication: PRE-OP CHEST , HIP SURGERY

 

Comparison: Chest radiograph December 29, 2017 

 

Findings: Small calcified granuloma noted left lower lobe. There is no 

acute focal consolidation or infiltrate identified. The heart size is 

normal. There is no evidence of pneumothorax or pleural effusion. No acute

osseous abnormalities are identified.  

 

Impression: No evidence of acute cardiopulmonary process. 

 

Electronically signed by: Jag Morel MD (10/10/2018 12:42 PM) [facility] No

## 2019-02-11 ENCOUNTER — HOSPITAL ENCOUNTER (EMERGENCY)
Dept: HOSPITAL 61 - ER | Age: 64
Discharge: HOME | End: 2019-02-11
Payer: MEDICARE

## 2019-02-11 VITALS — WEIGHT: 180 LBS | HEIGHT: 67 IN | BODY MASS INDEX: 28.25 KG/M2

## 2019-02-11 VITALS — DIASTOLIC BLOOD PRESSURE: 79 MMHG | SYSTOLIC BLOOD PRESSURE: 147 MMHG

## 2019-02-11 DIAGNOSIS — G89.29: ICD-10-CM

## 2019-02-11 DIAGNOSIS — J02.9: Primary | ICD-10-CM

## 2019-02-11 PROCEDURE — 71046 X-RAY EXAM CHEST 2 VIEWS: CPT

## 2019-02-11 PROCEDURE — 99283 EMERGENCY DEPT VISIT LOW MDM: CPT

## 2019-02-11 RX ADMIN — IBUPROFEN ONE MG: 400 TABLET ORAL at 04:58

## 2019-02-11 NOTE — PHYS DOC
Past Medical History


Past Medical History:  Other


Additional Past Medical Histor:  chronic back pain, Chronic hip pain


Past Surgical History:  Hip Replacement, Lumbar Laminectomy, Other


Additional Past Surgical Histo:  back surgery- hardware, inguinal hernia


Alcohol Use:  Occasionally


Drug Use:  Marijuana





Adult General


Chief Complaint


Chief Complaint:  SORE THROAT





HPI


HPI





Patient is a 63  year old male who presents with sore throat, cough, nasal 

congestion for the past 2 days. Denies any specific chest pains or 

palpitations. Patient reports feeling of fever but did not take his temperature 

yesterday. Has not taken any medicines to help with the cough or sore throat. 

Symptoms are moderate in severity. Patient did receive the flu vaccine this 

season. He has been exposed to sick family members with similar complaints, 

specifically his granddaughter. []





Review of Systems


Review of Systems





Constitutional: Denies fever or chills []


Eyes: Denies change in visual acuity, redness, or eye pain []


HENT: See history of present illness[]


Respiratory: See history of present illness[]


Cardiovascular: Denies chest pain on exertion, nor palpitations[]


GI: Denies abdominal pain, nausea, vomiting, bloody stools or diarrhea []


: Denies dysuria or hematuria []


Musculoskeletal: Denies back pain or joint pain []


Integument: Denies rash or skin lesions []


Neurologic: Denies headache, focal weakness or sensory changes []


Endocrine: Denies polyuria or polydipsia []





All other systems were reviewed and found to be within normal limits, except as 

documented in this note.





Allergies


Allergies





Allergies








Coded Allergies Type Severity Reaction Last Updated Verified


 


  No Known Drug Allergies    11/12/18 No











Physical Exam


Physical Exam





Constitutional: Well developed, well nourished, no acute distress, non-toxic 

appearance. []


HENT: Normocephalic, atraumatic, bilateral external ears normal, oropharynx 

moist, no oral exudates, nose normal. []


Eyes: PERRLA, EOMI, conjunctiva normal, no discharge. [] 


Neck: Normal range of motion, no tenderness, supple, no stridor. [] 


Cardiovascular:Heart rate regular rhythm, no murmur []


Lungs & Thorax:  Bilateral breath sounds clear to auscultation []


Abdomen: Bowel sounds normal, soft, no tenderness, no masses, no pulsatile 

masses. [] 


Skin: Warm, dry, no erythema, no rash. [] 


Back: No tenderness, no CVA tenderness. [] 


Extremities: No tenderness, no cyanosis, no clubbing, ROM intact, no edema. [] 


Neurologic: Alert and oriented X 3, normal motor function, normal sensory 

function, no focal deficits noted. []


Psychologic: Affect normal, judgement normal, mood normal. []





Current Patient Data


Vital Signs





 Vital Signs








  Date Time  Temp Pulse Resp B/P (MAP) Pulse Ox O2 Delivery O2 Flow Rate FiO2


 


2/11/19 03:52 98.6 82 17 147/79 (101) 96 Room Air  





 98.6       











EKG


EKG


[]





Radiology/Procedures


Radiology/Procedures


Chest x-ray shows no infiltrate, no effusion, no pneumothorax. No acute changes 

from chest x-ray of 10/10/2018[]





Course & Med Decision Making


Course & Med Decision Making


Pertinent Labs and Imaging studies reviewed. (See chart for details)





Medical decision making: There is no evidence of pneumonia or pneumothorax. We'

ll cover the patient for upper respiratory infection/pharyngitis/sinusitis. 

There is no evidence of hypoxia[]





Dragon Disclaimer


Dragon Disclaimer


This electronic medical record was generated, in whole or in part, using a 

voice recognition dictation system.





Departure


Departure


Impression:  


 Primary Impression:  


 Upper respiratory infection


 Additional Impression:  


 Pharyngitis


Disposition:  01 HOME, SELF-CARE


Condition:  IMPROVED


Referrals:  


PING LACY (PCP)


Follow-up in 2 days


Patient Instructions:  Upper Respiratory Infection, Adult, Viral and Bacterial 

Pharyngitis





Additional Instructions:  


Drink plenty of fluids. Follow-up with your regular doctor in 2 days. Return to 

the ER if worsening discomfort, difficulty breathing, or any other concerns.


Scripts


Ibuprofen (IBUPROFEN) 600 Mg Tablet


600 MG PO PRN Q6HRS PRN for MILD PAIN / TEMP, #20 TAB


   take with food or milk


   Prov: CAM JACKSON DO         2/11/19 


Doxycycline Hyclate (DOXYCYCLINE HYCLATE) 100 Mg Tablet


100 MG PO BID, #20 TAB


   Prov: CAM JACKSON DO         2/11/19 


D-Methorphan Hb/Prometh Hcl (PROMETHAZINE-DM SYRUP) 118 Ml Syrup


5 ML PO PRN Q4HRS, #120 ML


   Prov: CAM JACKSON DO         2/11/19





Problem Qualifiers








 Primary Impression:  


 Upper respiratory infection


 URI type:  unspecified URI  Qualified Codes:  J06.9 - Acute upper respiratory 

infection, unspecified


 Additional Impression:  


 Pharyngitis


 Pharyngitis/tonsillitis etiology:  unspecified etiology  Qualified Codes:  

J02.9 - Acute pharyngitis, unspecified








CAM JACKSON DO Feb 11, 2019 04:13

## 2019-02-11 NOTE — RAD
CHEST PA   LATERAL

 

Technique:  PA and lateral views of the chest were obtained.

 

Clinical History: Productive cough

 

Comparison: None.

 

Findings:

 The heart and pulmonary vasculature appear within normal limits. There is

a calcified granuloma left and there is a few chronic linear opacities on 

the right. The pleural margins are clear.

 

Impression:

 

No acute chest process is seen. 

 

Electronically signed by: Marc Ayala III, MD (2/11/2019 5:09 AM) [facility]

## 2019-03-20 ENCOUNTER — HOSPITAL ENCOUNTER (OUTPATIENT)
Dept: HOSPITAL 61 - ER | Age: 64
Setting detail: OBSERVATION
LOS: 1 days | Discharge: HOME | End: 2019-03-21
Attending: INTERNAL MEDICINE | Admitting: INTERNAL MEDICINE
Payer: MEDICARE

## 2019-03-20 VITALS — DIASTOLIC BLOOD PRESSURE: 63 MMHG | SYSTOLIC BLOOD PRESSURE: 136 MMHG

## 2019-03-20 VITALS — HEIGHT: 67 IN | BODY MASS INDEX: 28.33 KG/M2 | WEIGHT: 180.5 LBS

## 2019-03-20 VITALS — SYSTOLIC BLOOD PRESSURE: 168 MMHG | DIASTOLIC BLOOD PRESSURE: 77 MMHG

## 2019-03-20 VITALS — DIASTOLIC BLOOD PRESSURE: 72 MMHG | SYSTOLIC BLOOD PRESSURE: 155 MMHG

## 2019-03-20 DIAGNOSIS — I25.10: ICD-10-CM

## 2019-03-20 DIAGNOSIS — Z96.643: ICD-10-CM

## 2019-03-20 DIAGNOSIS — M25.559: ICD-10-CM

## 2019-03-20 DIAGNOSIS — G89.29: ICD-10-CM

## 2019-03-20 DIAGNOSIS — F11.20: ICD-10-CM

## 2019-03-20 DIAGNOSIS — F12.90: ICD-10-CM

## 2019-03-20 DIAGNOSIS — Z83.3: ICD-10-CM

## 2019-03-20 DIAGNOSIS — K40.20: ICD-10-CM

## 2019-03-20 DIAGNOSIS — M54.9: ICD-10-CM

## 2019-03-20 DIAGNOSIS — R07.89: Primary | ICD-10-CM

## 2019-03-20 LAB
ALBUMIN SERPL-MCNC: 3.3 G/DL (ref 3.4–5)
ALBUMIN/GLOB SERPL: 0.9 {RATIO} (ref 1–1.7)
ALP SERPL-CCNC: 88 U/L (ref 46–116)
ALT SERPL-CCNC: 22 U/L (ref 16–63)
ANION GAP SERPL CALC-SCNC: 8 MMOL/L (ref 6–14)
APTT BLD: 37 SEC (ref 24–38)
AST SERPL-CCNC: 15 U/L (ref 15–37)
BASOPHILS # BLD AUTO: 0 X10^3/UL (ref 0–0.2)
BASOPHILS NFR BLD: 1 % (ref 0–3)
BILIRUB SERPL-MCNC: 0.2 MG/DL (ref 0.2–1)
BUN SERPL-MCNC: 8 MG/DL (ref 8–26)
BUN/CREAT SERPL: 10 (ref 6–20)
CALCIUM SERPL-MCNC: 8.4 MG/DL (ref 8.5–10.1)
CHLORIDE SERPL-SCNC: 105 MMOL/L (ref 98–107)
CK SERPL-CCNC: 146 U/L (ref 39–308)
CK SERPL-CCNC: 146 U/L (ref 39–308)
CO2 SERPL-SCNC: 29 MMOL/L (ref 21–32)
CREAT SERPL-MCNC: 0.8 MG/DL (ref 0.7–1.3)
EOSINOPHIL NFR BLD: 0.1 X10^3/UL (ref 0–0.7)
EOSINOPHIL NFR BLD: 2 % (ref 0–3)
ERYTHROCYTE [DISTWIDTH] IN BLOOD BY AUTOMATED COUNT: 13.8 % (ref 11.5–14.5)
GFR SERPLBLD BASED ON 1.73 SQ M-ARVRAT: 118.9 ML/MIN
GLOBULIN SER-MCNC: 3.8 G/DL (ref 2.2–3.8)
GLUCOSE SERPL-MCNC: 105 MG/DL (ref 70–99)
HCT VFR BLD CALC: 42.2 % (ref 39–53)
HGB BLD-MCNC: 13.8 G/DL (ref 13–17.5)
LIPASE: 193 U/L (ref 73–393)
LYMPHOCYTES # BLD: 0.7 X10^3/UL (ref 1–4.8)
LYMPHOCYTES NFR BLD AUTO: 13 % (ref 24–48)
MAGNESIUM SERPL-MCNC: 2.1 MG/DL (ref 1.8–2.4)
MCH RBC QN AUTO: 30 PG (ref 25–35)
MCHC RBC AUTO-ENTMCNC: 33 G/DL (ref 31–37)
MCV RBC AUTO: 91 FL (ref 79–100)
MONO #: 0.6 X10^3/UL (ref 0–1.1)
MONOCYTES NFR BLD: 11 % (ref 0–9)
NEUT #: 4 X10^3UL (ref 1.8–7.7)
NEUTROPHILS NFR BLD AUTO: 73 % (ref 31–73)
PLATELET # BLD AUTO: 172 X10^3/UL (ref 140–400)
POTASSIUM SERPL-SCNC: 3.6 MMOL/L (ref 3.5–5.1)
PROT SERPL-MCNC: 7.1 G/DL (ref 6.4–8.2)
PROTHROMBIN TIME: 12.6 SEC (ref 11.7–14)
RBC # BLD AUTO: 4.65 X10^6/UL (ref 4.3–5.7)
SODIUM SERPL-SCNC: 142 MMOL/L (ref 136–145)
WBC # BLD AUTO: 5.4 X10^3/UL (ref 4–11)

## 2019-03-20 PROCEDURE — 80053 COMPREHEN METABOLIC PANEL: CPT

## 2019-03-20 PROCEDURE — 83690 ASSAY OF LIPASE: CPT

## 2019-03-20 PROCEDURE — G0378 HOSPITAL OBSERVATION PER HR: HCPCS

## 2019-03-20 PROCEDURE — 87070 CULTURE OTHR SPECIMN AEROBIC: CPT

## 2019-03-20 PROCEDURE — 80061 LIPID PANEL: CPT

## 2019-03-20 PROCEDURE — 83735 ASSAY OF MAGNESIUM: CPT

## 2019-03-20 PROCEDURE — 71045 X-RAY EXAM CHEST 1 VIEW: CPT

## 2019-03-20 PROCEDURE — 82550 ASSAY OF CK (CPK): CPT

## 2019-03-20 PROCEDURE — 36415 COLL VENOUS BLD VENIPUNCTURE: CPT

## 2019-03-20 PROCEDURE — 85025 COMPLETE CBC W/AUTO DIFF WBC: CPT

## 2019-03-20 PROCEDURE — 93306 TTE W/DOPPLER COMPLETE: CPT

## 2019-03-20 PROCEDURE — 87880 STREP A ASSAY W/OPTIC: CPT

## 2019-03-20 PROCEDURE — 83880 ASSAY OF NATRIURETIC PEPTIDE: CPT

## 2019-03-20 PROCEDURE — 85730 THROMBOPLASTIN TIME PARTIAL: CPT

## 2019-03-20 PROCEDURE — 93005 ELECTROCARDIOGRAM TRACING: CPT

## 2019-03-20 PROCEDURE — 85610 PROTHROMBIN TIME: CPT

## 2019-03-20 PROCEDURE — G0379 DIRECT REFER HOSPITAL OBSERV: HCPCS

## 2019-03-20 PROCEDURE — 84484 ASSAY OF TROPONIN QUANT: CPT

## 2019-03-20 PROCEDURE — 99284 EMERGENCY DEPT VISIT MOD MDM: CPT

## 2019-03-20 PROCEDURE — 84443 ASSAY THYROID STIM HORMONE: CPT

## 2019-03-20 PROCEDURE — 82553 CREATINE MB FRACTION: CPT

## 2019-03-20 RX ADMIN — LISINOPRIL SCH MG: 10 TABLET ORAL at 17:08

## 2019-03-20 RX ADMIN — OXYCODONE HYDROCHLORIDE AND ACETAMINOPHEN PRN TAB: 10; 325 TABLET ORAL at 17:47

## 2019-03-20 RX ADMIN — PANTOPRAZOLE SODIUM SCH MG: 40 TABLET, DELAYED RELEASE ORAL at 17:08

## 2019-03-20 RX ADMIN — Medication PRN MG: at 23:59

## 2019-03-20 RX ADMIN — GUAIFENESIN AND DEXTROMETHORPHAN PRN ML: 100; 10 SYRUP ORAL at 19:42

## 2019-03-20 NOTE — PDOC2
ASHLEY ROSARIO APRN 3/20/19 1533:


CARDIAC CONSULT


DATE OF CONSULT


Date of Consult


DATE: 3/20/19 


TIME: 15:30





REASON FOR CONSULT


Reason for Consult:


Chest pain





REFERRING PHYSICIAN


Referring Physician:


Fabricio





SOURCE


Source:  Chart review, Patient





HISTORY OF PRESENT ILLNESS


HISTORY OF PRESENT ILLNESS


This is a pleasant 62 yo male admitted for complains of sore throat, cough and 

chest pain.  Denies any SOA, palpitations. He has been having this nasal 

congestion and has been having productive cough with white sputum in the last 2-

3days.  Also with sore throat but no reported fever.  He has mid chest 

discomfort that is achy but also this is reproducible with palpation with also 

epigastric tenderness. No GRIJALVA or exertional CP.  No recent falls or injury.  

Denies heartburn and no frequent ibuprofen but does take oxycodone for his 

chronic pain.  No past CAD, VTE, arrhythmias and no recent MVA.





PAST MEDICAL HISTORY


Psych:  Anxiety


Musculoskeletal:  Osteoarthritis





PAST SURGICAL HISTORY


Past Surgical History:  Hernia Repair, Total hip replacement (bilateral), Other 

(lumbar laminectomy)





FAMILY HISTORY


Family History:  Hypertension





SOCIAL HISTORY


Smoke:  <1 pack per day


ALCOHOL:  occassional


Drugs:  Marijuana





CURRENT MEDICATIONS


CURRENT MEDICATIONS





Current Medications








 Medications


  (Trade)  Dose


 Ordered  Sig/Andrew


 Route


 PRN Reason  Start Time


 Stop Time Status Last Admin


Dose Admin


 


 Multi-Ingredient


 Mouthwash/Gargle


  (Gi Cocktail)  20 ml  1X  ONCE


 SWSW


   3/20/19 09:30


 3/20/19 09:31 DC 3/20/19 09:36





 


 Aspirin


  ([facility]


 Aspirin)  324 mg  1X  ONCE


 PO


   3/20/19 13:00


 3/20/19 13:01 DC 3/20/19 12:58














ALLERGIES


ALLERGIES:  


Coded Allergies:  


     No Known Drug Allergies (Unverified , 11/12/18)





ROS


Review of System


14 point ROS evaluated with pertinent positives noted per HPI





PHYSICAL EXAM


General:  Alert, Oriented X3, Cooperative, No acute distress


HEENT:  Atraumatic, Mucous membr. moist/pink


Lungs:  Clear to auscultation, Normal air movement


Heart:  Regular rate (SR), Normal S1, Normal S2, Other (2/6 systolic murmur to 

LLS border)


Abdomen:  Soft, No tenderness


Extremities:  No cyanosis, No edema


Skin:  No breakdown, No significant lesion


Neuro:  Normal speech, Sensation intact


Psych/Mental Status:  Mental status NL, Mood NL


MUSCULOSKELETAL:  Osteoarthritic changes both hands





VITALS


VITALS





Vital Signs








  Date Time  Temp Pulse Resp B/P (MAP) Pulse Ox O2 Delivery O2 Flow Rate FiO2


 


3/20/19 14:16      Room Air  


 


3/20/19 13:30 98.7 58 18 168/77 (107) 99   





 98.7       











LABS


Lab:





Laboratory Tests








Test


 3/20/19


09:25 3/20/19


09:28


 


White Blood Count


 5.4 x10^3/uL


(4.0-11.0) 





 


Red Blood Count


 4.65 x10^6/uL


(4.30-5.70) 





 


Hemoglobin


 13.8 g/dL


(13.0-17.5) 





 


Hematocrit


 42.2 %


(39.0-53.0) 





 


Mean Corpuscular Volume 91 fL ()  


 


Mean Corpuscular Hemoglobin 30 pg (25-35)  


 


Mean Corpuscular Hemoglobin


Concent 33 g/dL


(31-37) 





 


Red Cell Distribution Width


 13.8 %


(11.5-14.5) 





 


Platelet Count


 172 x10^3/uL


(140-400) 





 


Neutrophils (%) (Auto) 73 % (31-73)  


 


Lymphocytes (%) (Auto) 13 % (24-48)  


 


Monocytes (%) (Auto) 11 % (0-9)  


 


Eosinophils (%) (Auto) 2 % (0-3)  


 


Basophils (%) (Auto) 1 % (0-3)  


 


Neutrophils # (Auto)


 4.0 x10^3uL


(1.8-7.7) 





 


Lymphocytes # (Auto)


 0.7 x10^3/uL


(1.0-4.8) 





 


Monocytes # (Auto)


 0.6 x10^3/uL


(0.0-1.1) 





 


Eosinophils # (Auto)


 0.1 x10^3/uL


(0.0-0.7) 





 


Basophils # (Auto)


 0.0 x10^3/uL


(0.0-0.2) 





 


Prothrombin Time


 12.6 SEC


(11.7-14.0) 





 


Prothromb Time International


Ratio 1.0 (0.8-1.1) 


 





 


Activated Partial


Thromboplast Time 37 SEC (24-38) 


 





 


Sodium Level


 142 mmol/L


(136-145) 





 


Potassium Level


 3.6 mmol/L


(3.5-5.1) 





 


Chloride Level


 105 mmol/L


() 





 


Carbon Dioxide Level


 29 mmol/L


(21-32) 





 


Anion Gap 8 (6-14)  


 


Blood Urea Nitrogen 8 mg/dL (8-26)  


 


Creatinine


 0.8 mg/dL


(0.7-1.3) 





 


Estimated GFR


(Cockcroft-Gault) 118.9 


 





 


BUN/Creatinine Ratio 10 (6-20)  


 


Glucose Level


 105 mg/dL


(70-99) 





 


Calcium Level


 8.4 mg/dL


(8.5-10.1) 





 


Magnesium Level


 2.1 mg/dL


(1.8-2.4) 





 


Total Bilirubin


 0.2 mg/dL


(0.2-1.0) 





 


Aspartate Amino Transf


(AST/SGOT) 15 U/L (15-37) 


 





 


Alanine Aminotransferase


(ALT/SGPT) 22 U/L (16-63) 


 





 


Alkaline Phosphatase


 88 U/L


() 





 


Creatine Kinase


 146 U/L


() 





 


Creatine Kinase MB (Mass)


 1.3 ng/mL


(0.0-3.6) 





 


Creatine Kinase MB Relative


Index 0.9 % (0-4) 


 





 


Troponin I Quantitative


 < 0.017 ng/mL


(0.000-0.055) 





 


NT-Pro-B-Type Natriuretic


Peptide 25 pg/mL


(0-124) 





 


Total Protein


 7.1 g/dL


(6.4-8.2) 





 


Albumin


 3.3 g/dL


(3.4-5.0) 





 


Albumin/Globulin Ratio 0.9 (1.0-1.7)  


 


Lipase


 193 U/L


() 





 


Group A Streptococcus Rapid


 


 Negative


(NEGATIVE)











ASSESSMENT/PLAN


ASSESSMENT/PLAN


1. Atypical chest pain  trop nml EKG Sr no acute changes. Possibly MSK


2. HTN: labile


3. Tobaccoism


4. URI


5. GERD





Recommendations


1. Trend trop. TTE


2. Start on lisinopril. Hydralazine IV PRN. PPI


3. TSH, lipids.  


4. Smoking cessation





FABIO CATALAN MD 3/20/19 4672:


CARDIAC CONSULT


ASSESSMENT/PLAN


ASSESSMENT/PLAN


Pt. seen and examined. Agree with above NP note. 


63 y.o male with non-cardiac chest pain. Normal trops, echo and EKG


Supportive care. No further testing needed.











ASHLEY ROSARIO APRN Mar 20, 2019 15:33


FABIO CATALAN MD Mar 20, 2019 17:52

## 2019-03-20 NOTE — EKG
[facility]

              [address] [place] [address]

Test Date:    2019               Test Time:    09:01:32

Pat Name:     OSMIN BARRAZA           Department:   

Patient ID:   [id number]           Room:          

Gender:       M                        Technician:   SC

:          [id number]               Requested By: SVETLANA ZAMARRIPA

Order Number: [id number]           Reading MD:   Terrence Cooper MD

                                 Measurements

Intervals                              Axis          

Rate:         66                       P:            34

MO:           172                      QRS:          92

QRSD:         100                      T:            28

QT:           360                                    

QTc:          379                                    

                           Interpretive Statements

SINUS RHYTHM

INCOMPLETE RIGHT BUNDLE BRANCH BLOCK



Electronically Signed On 3- 17:52:11 CDT by Terrence Cooper MD

## 2019-03-20 NOTE — CARD
MR#: [id number]

Account#: [id number]

Accession#: [id number]

Date of Study: 03/20/2019

Ordering Physician: ASHLEY ROSARIO,

Referring Physician: NAN SMITH

Tech: Yasemin Francisco JANETTE





--------------- APPROVED REPORT --------------





EXAM: Two-dimensional and M-mode echocardiogram with Doppler and color Doppler.



Other Information 

Quality : GoodHR: 60bpm

Rhythm : NSR



INDICATION

Chest Pain 



2D DIMENSIONS 

RVDd3.0 (2.9-3.5cm)Left Atrium(2D)3.5 (1.6-4.0cm)

IVSd1.2 (0.7-1.1cm)Aortic Root(2D)3.4 (2.0-3.7cm)

LVDd5.0 (3.9-5.9cm)LVOT Diameter2.0 (1.8-2.4cm)

PWd1.2 (0.7-1.1cm)IVSs2.1 (0.8-1.2cm)

LVDs2.6 (2.5-4.0cm)FS (%) 47.7 %

PWs1.8 (0.8-1.2cm)SV92.4 ml



M-Mode DIMENSIONS 

Left Atrium(MM)3.48 (2.5-4.0cm)Aortic Root3.77 (2.2-3.7cm)



Aortic Valve

AoV Peak Luis Enrique.179.4cm/sAoV VTI33.1cm

AO Peak GR.12.9mmHgLVOT Peak Luis Enrique.110.0cm/s

LVOT  VTI 26.32cmAO Mean GR.6mmHg

IVONNE (VMAX)2.74rv3BAQ   (VTI)2.56cm2

AI P 1/2 [id number]



Mitral Valve

MV E Pucsnggm370.8cm/sMV DECEL [id number]

MV A Eowekusf835.6cm/sMV [id number]

E/A  Ratio1.0MVA (PHT)5.38cm2



TDI

E/Lateral E'11.1E/Medial E'10.9



Pulmonary Valve

PV Peak Gcjtcjfy09.4cm/sPV Peak Grad.2mmHg



 LEFT VENTRICLE 

The left ventricle is normal size. There is mild concentric left ventricular hypertrophy. The left ve
ntricular systolic function is normal and the ejection fraction is within normal range. The Ejection 
Fraction is 55-60%. There is normal LV segmental wall motion. Transmitral Doppler flow pattern is Gra
de I-abnormal relaxation pattern.



 RIGHT VENTRICLE 

The right ventricle is normal size. There is normal right ventricular wall thickness. The right ventr
icular systolic function is normal.



 ATRIA 

The left atrium size is normal. The right atrium size is normal. The interatrial septum is intact wit
h no evidence for an atrial septal defect or patent foramen ovale as noted on 2-D or Doppler imaging.




 AORTIC VALVE 

The aortic valve is thickened but opens well. The aortic valve is trileaflet. Doppler and Color Flow 
revealed mild aortic regurgitation. There is no significant aortic valvular stenosis.



 MITRAL VALVE 

The mitral valve is normal in structure and function. There is no evidence of mitral valve prolapse. 
There is no mitral valve stenosis. Doppler and Color Flow revealed no mitral valve regurgitation note
d.



 TRICUSPID VALVE 

The tricuspid valve is normal in structure and function. Doppler and Color Flow revealed no tricuspid
 valve regurgitation noted. There is no tricuspid valve prolapse or vegetation. There is no tricuspid
 valve stenosis.



 PULMONIC VALVE 

The pulmonic valve is not well visualized.



 GREAT VESSELS 

The aortic root is mildly enlarged. The ascending aorta is normal in size. The IVC is normal in size 
and collapses >50% with inspiration.



 PERICARDIAL EFFUSION 

There is no evidence of significant pericardial effusion.



Critical Notification

Critical Value: No



<Conclusion>

The left ventricle is normal size.

The left ventricular systolic function is normal and the ejection fraction is within normal range.

The Ejection Fraction is 55-60%.

There is mild concentric left ventricular hypertrophy.

There is no significant aortic valvular stenosis.

Doppler and Color Flow revealed mild aortic regurgitation.

Doppler and Color Flow revealed no mitral valve regurgitation noted.

Doppler and Color Flow revealed no tricuspid valve regurgitation noted.



Signed by : Wilber Jang MD

Electronically Approved : 03/20/2019 16:52:50

## 2019-03-20 NOTE — PHYS DOC
Past Medical History


Past Medical History:  Other


Additional Past Medical Histor:  chronic back pain, Chronic hip pain


Past Surgical History:  Hip Replacement, Lumbar Laminectomy, Other


Additional Past Surgical Histo:  back surgery- hardware, inguinal hernia; 

bilateral hip


Additional Information:  


Pt reports quitting today.


Alcohol Use:  Occasionally


Additional Information:  


denies today


Drug Use:  Marijuana





Adult General


Chief Complaint


Chief Complaint:  CHEST PAIN





HPI


HPI





Patient is a 63  year old male presented to ER today for evaluation of 

epigastric abdominal pain ,chest pain, sore throat, productive cough for 2 

days. Patient denies any trouble breathing, no nausea or vomiting. Patient is a 

smoker, denies any history hypertension, no history of diabetic, no history of 

coronary artery disease. Patient had no history of blood clot disorder.  

Patient denies any recent operation or travel. Patient said he been out of his 

Oxycodone for his chronic pain.





Review of Systems


Review of Systems





Constitutional: Denies fever or chills []


Eyes: Denies change in visual acuity, redness, or eye pain []


HENT: POSITIVE FOR NASAL CONGESTION, SORE THROAT


Respiratory: POSITIVE FOR  cough , NO shortness of breath []


Cardiovascular: Positive for chest pain


GI: Denies abdominal pain, nausea, vomiting, bloody stools or diarrhea []


: Denies dysuria or hematuria []


Musculoskeletal: Denies back pain or joint pain []


Integument: Denies rash or skin lesions []


Neurologic: Denies headache, focal weakness or sensory changes []


Endocrine: Denies polyuria or polydipsia []





All other systems were reviewed and found to be within normal limits, except as 

documented in this note.





Current Medications


Current Medications





Current Medications








 Medications


  (Trade)  Dose


 Ordered  Sig/Andrew  Start Time


 Stop Time Status Last Admin


Dose Admin


 


 Multi-Ingredient


 Mouthwash/Gargle


  (Gi Cocktail)  20 ml  1X  ONCE  3/20/19 09:30


 3/20/19 09:31 DC 3/20/19 09:36


20 ML











Allergies


Allergies





Allergies








Coded Allergies Type Severity Reaction Last Updated Verified


 


  No Known Drug Allergies    11/12/18 No











Physical Exam


Physical Exam





Constitutional: Well developed, well nourished, no acute distress, non-toxic 

appearance. []


HENT: Normocephalic, atraumatic, bilateral external ears normal, oropharynx 

moist WITH ERYTHEMA, no oral exudates, nose normal. []


Eyes: PERRLA, EOMI, conjunctiva normal, no discharge. [] 


Neck: Normal range of motion, no tenderness, supple, no stridor. [] 


Cardiovascular:Heart rate regular rhythm,  MILD SYSTOLIC  murmur []


Lungs & Thorax:  Bilateral breath sounds clear to auscultation []


Abdomen: Bowel sounds normal, soft, no tenderness, no masses, no pulsatile 

masses. [] 


Skin: Warm, dry, no erythema, no rash. [] 


Back: No tenderness, no CVA tenderness. [] 


Extremities: No tenderness, no cyanosis, no clubbing, ROM intact, no edema. [] 


Neurologic: Alert and oriented X 3, normal motor function, normal sensory 

function, no focal deficits noted. []


Psychologic: Affect normal, judgement normal, mood normal. []





Current Patient Data


Vital Signs





 Vital Signs








  Date Time  Temp Pulse Resp B/P (MAP) Pulse Ox O2 Delivery O2 Flow Rate FiO2


 


3/20/19 09:01 98.0 74 16 152/77 (102) 97 Room Air  





 98.0       








Lab Values





 Laboratory Tests








Test


 3/20/19


09:25 3/20/19


09:28


 


White Blood Count


 5.4 x10^3/uL


(4.0-11.0) 





 


Red Blood Count


 4.65 x10^6/uL


(4.30-5.70) 





 


Hemoglobin


 13.8 g/dL


(13.0-17.5) 





 


Hematocrit


 42.2 %


(39.0-53.0) 





 


Mean Corpuscular Volume


 91 fL ()


 





 


Mean Corpuscular Hemoglobin 30 pg (25-35)   


 


Mean Corpuscular Hemoglobin


Concent 33 g/dL


(31-37) 





 


Red Cell Distribution Width


 13.8 %


(11.5-14.5) 





 


Platelet Count


 172 x10^3/uL


(140-400) 





 


Neutrophils (%) (Auto) 73 % (31-73)   


 


Lymphocytes (%) (Auto) 13 % (24-48)  L 


 


Monocytes (%) (Auto) 11 % (0-9)  H 


 


Eosinophils (%) (Auto) 2 % (0-3)   


 


Basophils (%) (Auto) 1 % (0-3)   


 


Neutrophils # (Auto)


 4.0 x10^3uL


(1.8-7.7) 





 


Lymphocytes # (Auto)


 0.7 x10^3/uL


(1.0-4.8)  L 





 


Monocytes # (Auto)


 0.6 x10^3/uL


(0.0-1.1) 





 


Eosinophils # (Auto)


 0.1 x10^3/uL


(0.0-0.7) 





 


Basophils # (Auto)


 0.0 x10^3/uL


(0.0-0.2) 





 


Prothrombin Time


 12.6 SEC


(11.7-14.0) 





 


Prothrombin Time INR 1.0 (0.8-1.1)   


 


PTT


 37 SEC (24-38)


 





 


Sodium Level


 142 mmol/L


(136-145) 





 


Potassium Level


 3.6 mmol/L


(3.5-5.1) 





 


Chloride Level


 105 mmol/L


() 





 


Carbon Dioxide Level


 29 mmol/L


(21-32) 





 


Anion Gap 8 (6-14)   


 


Blood Urea Nitrogen


 8 mg/dL (8-26)


 





 


Creatinine


 0.8 mg/dL


(0.7-1.3) 





 


Estimated GFR


(Cockcroft-Gault) 118.9  


 





 


BUN/Creatinine Ratio 10 (6-20)   


 


Glucose Level


 105 mg/dL


(70-99)  H 





 


Calcium Level


 8.4 mg/dL


(8.5-10.1)  L 





 


Magnesium Level


 2.1 mg/dL


(1.8-2.4) 





 


Total Bilirubin


 0.2 mg/dL


(0.2-1.0) 





 


Aspartate Amino Transferase


(AST) 15 U/L (15-37)


 





 


Alanine Aminotransferase (ALT)


 22 U/L (16-63)


 





 


Alkaline Phosphatase


 88 U/L


() 





 


Creatine Kinase


 146 U/L


() 





 


Creatine Kinase MB (Mass)


 1.3 ng/mL


(0.0-3.6) 





 


Creatine Kinase MB Relative


Index 0.9 % (0-4)  


 





 


Troponin I Quantitative


 < 0.017 ng/mL


(0.000-0.055) 





 


NT-Pro-B-Type Natriuretic


Peptide 25 pg/mL


(0-124) 





 


Total Protein


 7.1 g/dL


(6.4-8.2) 





 


Albumin


 3.3 g/dL


(3.4-5.0)  L 





 


Albumin/Globulin Ratio


 0.9 (1.0-1.7)


L 





 


Lipase


 193 U/L


() 





 


Group A Streptococcus Rapid


 


 Negative


(NEGATIVE)





 Laboratory Tests


3/20/19 09:25








 Laboratory Tests


3/20/19 09:25











EKG


EKG


ekg was read by this physician at 0902, rate of 66 bpm, sinus rhythm, NO STEMI, 

INCOMPLETE RBBB[]





Radiology/Procedures


Radiology/Procedures


CHEST XRAY: NO ACUTE DISEASE. []





Course & Med Decision Making


Course & Med Decision Making


Pertinent Labs and Imaging studies reviewed. (See chart for details)





[]





Dragon Disclaimer


Dragon Disclaimer


This electronic medical record was generated, in whole or in part, using a 

voice recognition dictation system.





Departure


Departure


Impression:  


 Primary Impression:  


 Chest pain


Disposition:  09 ADMITTED AS INPATIENT


Admitting Physician:  Katie Howe


Condition:  IMPROVED


Referrals:  


UNKNOWN PCP NAME (PCP)











SVETLANA ZAMARRIPA DO Mar 20, 2019 09:42

## 2019-03-20 NOTE — HP
ADMIT DATE:  03/20/2019



CHIEF COMPLAINT:  Chest pain.



HISTORY OF PRESENT ILLNESS:  The patient is a pleasant 63-year-old male who

presented to the ER with chest pain.  It is epigastric and he has got abdominal

pain as well.  He states he has got some pressure in his chest.  Denies any

history of coronary artery disease, but states he had a recent hip replacement. 

He is on oxycodone and Valium.  He describes his pain as agonizing, worse with

moving.  I have discussed the case with the ER physician.  We are going to admit

the patient and consult Cardiology.



PAST MEDICAL HISTORY:  Hip replacement, chronic pain, probable narcotic

dependence, lumbar laminectomy with hardware, inguinal hernia, bilateral hip

surgeries, marijuana use.



ALLERGIES:  None.



FAMILY HISTORY:  Diabetes.



SOCIAL HISTORY:  Does not drink, smoke or take drugs.  He states he is on

disability.



MEDICATIONS:  Reviewed, please refer to [facility].  He is on Percocet 10,

doxycycline, Valium, trazodone, promethazine.



REVIEW OF SYSTEMS:

GENERAL:  No history of weight change, weakness or fevers.

SKIN:  No bruising, hair changes or rashes.

EYES:  No blurred, double or loss of vision.

NOSE AND THROAT:  No history of nosebleeds, hoarseness or sore throat.

HEART:  He complains of chest pain.

LUNGS:  Denies cough, hemoptysis, wheezing or shortness of breath.

GASTROINTESTINAL:  Denies changes in appetite, nausea, vomiting, diarrhea or

constipation.

GENITOURINARY:  No history of frequency, urgency, hesitancy or nocturia.

NEUROLOGIC:  Denies history of numbness, tingling, tremor or weakness.

PSYCHIATRIC:  No history of panic, anxiety or depression.

ENDOCRINE:  No history of heat or cold intolerance, polyuria or polydipsia.

EXTREMITIES:  Denies muscle weakness, joint pain, pain on walking or stiffness.



PHYSICAL EXAMINATION:

VITAL SIGNS:  Temperature 98, pulse 74, respirations 16, blood pressure 152/70.

GENERAL:  He is alert, cooperative.

HEART:  Normal S1, S2.

LUNGS:  Clear.

ABDOMEN:  Soft.

EXTREMITIES:  Trace edema.

SKIN:  No rash.

ENDOCRINE:  No thyromegaly.

LYMPHATICS:  No cervical nodes.

HEMATOPOIETIC:  No bruising.

PSYCHIATRIC:  He is stable.



LABORATORY DATA:  Hematology is normal.  Electrolytes were normal.  INR is 1. 

Flu test is negative.  Chest x-ray is negative.  EKG shows sinus rhythm.



ASSESSMENT AND PLAN:  Chest pain, rule out coronary artery disease.  The patient

has been admitted.  We will check serial enzymes, serial EKGs, cardiac

monitoring.  DVT prophylaxis.  Home meds.  Consult Cardiology.

 



______________________________

NAN SMITH DO



DR:  JOSE/alvin  JOB#:  [id number] / [id number]

DD:  03/20/2019 12:33  DT:  03/20/2019 12:49

## 2019-03-20 NOTE — RAD
PROCEDURE: PORTABLE CHEST 1V

 

CLINICAL INDICATION: CHEST PAIN

 

COMPARISON: 2/11/2019

 

FINDINGS:  

No pneumothorax identified. Cardiac and mediastinal contours unremarkable.

No pulmonary consolidation or acute airspace disease. No acute osseous 

abnormalities identified. Calcified granuloma in the left lower lobe.

 

IMPRESSION:

No pulmonary consolidation or acute airspace disease. 

 

 

 

Electronically signed by: Myles Pagan DO (3/20/2019 9:20 AM) [facility]

## 2019-03-21 VITALS — DIASTOLIC BLOOD PRESSURE: 70 MMHG | SYSTOLIC BLOOD PRESSURE: 155 MMHG

## 2019-03-21 VITALS — SYSTOLIC BLOOD PRESSURE: 136 MMHG | DIASTOLIC BLOOD PRESSURE: 62 MMHG

## 2019-03-21 VITALS — SYSTOLIC BLOOD PRESSURE: 157 MMHG | DIASTOLIC BLOOD PRESSURE: 74 MMHG

## 2019-03-21 VITALS — DIASTOLIC BLOOD PRESSURE: 72 MMHG | SYSTOLIC BLOOD PRESSURE: 143 MMHG

## 2019-03-21 LAB
CHOLEST SERPL-MCNC: 156 MG/DL (ref 0–200)
CHOLEST/HDLC SERPL: 4 {RATIO}
HDLC SERPL-MCNC: 39 MG/DL (ref 40–60)
LDLC: 104 MG/DL (ref 0–100)
TRIGL SERPL-MCNC: 65 MG/DL (ref 0–150)
VLDLC: 13 MG/DL (ref 0–40)

## 2019-03-21 RX ADMIN — OXYCODONE HYDROCHLORIDE AND ACETAMINOPHEN PRN TAB: 10; 325 TABLET ORAL at 11:12

## 2019-03-21 RX ADMIN — Medication PRN MG: at 08:54

## 2019-03-21 RX ADMIN — PANTOPRAZOLE SODIUM SCH MG: 40 TABLET, DELAYED RELEASE ORAL at 08:54

## 2019-03-21 RX ADMIN — GUAIFENESIN AND DEXTROMETHORPHAN PRN ML: 100; 10 SYRUP ORAL at 05:01

## 2019-03-21 RX ADMIN — LISINOPRIL SCH MG: 10 TABLET ORAL at 08:53

## 2019-03-21 RX ADMIN — OXYCODONE HYDROCHLORIDE AND ACETAMINOPHEN PRN TAB: 10; 325 TABLET ORAL at 01:10

## 2019-03-21 NOTE — NUR
Discharge Note:



OSMIN BARRAZA [address] [place] [address]



Discharge instructions and discharge home medications reviewed with Patient and a copy 
given. All questions have been answered and understanding verbalized. 



The following instructions and handouts were given: CP nonspecific



Discontinued IV line



Patient discharged to home with self care via wheelchair

## 2019-03-21 NOTE — PDOC
PROGRESS NOTES


History of Present Illness


History of Present Illness





ASSESSMENT AND PLAN:  


Chest pain, 


non-cardiac chest pain. Normal trops, echo and EKG








admitted. 





serial enzymes, 


serial EKGs, 


cardiac monitoring.  


DVT prophylaxis.  


Home meds.  


 Cardiology.OK WITH D/C TODAY, SEE PCP ASAP





Vitals


Vitals





Vital Signs








  Date Time  Temp Pulse Resp B/P (MAP) Pulse Ox O2 Delivery O2 Flow Rate FiO2


 


3/21/19 10:53 98.6 78 18 157/74 (101) 98 Room Air  





 98.6       


 


3/21/19 08:00       2.0 











Physical Exam


Physical Exam


Lungs & Thorax:  Bilateral breath sounds clear to auscultation []


Abdomen: Bowel sounds normal, soft, no tenderness, no masses, no pulsatile 

masses. [] 


Skin: Warm, dry, no erythema, no rash. [] 


Back: No tenderness, no CVA tenderness. [] 


Extremities: No tenderness, no cyanosis, no clubbing, ROM intact, no edema. [] 


Neurologic: Alert and oriented X 3, normal motor function, normal sensory 

function, no focal deficits noted. []


Psychologic: Affect normal, judgement normal, mood normal. []


General:  Alert, Oriented X3, Cooperative, No acute distress


Heart:  Regular rate (SR), Normal S1, Normal S2, Other (2/6 systolic murmur to 

LLS border)


Lungs:  Clear


Abdomen:  Soft, No tenderness


Extremities:  No cyanosis, No edema


Skin:  No breakdown, No significant lesion





Labs


LABS


                        [address] [address]


Test Date:    2019               Test Time:    09:01:32


Pat Name:     OSMIN BARRAZA           Department:   


Patient ID:   [id number]           Room:          


Gender:       M                        Technician:   SC


:          1955               Requested By: SVETLANA ZAMARRIPA


Order Number: [id number]           Reading MD:   Fabio Cooper MD


                                 Measurements


Intervals                              Axis          


Rate:         66                       P:            34


TN:           172                      QRS:          92


QRSD:         100                      T:            28


QT:           360                                    


QTc:          379                                    


                           Interpretive Statements


SINUS RHYTHM


INCOMPLETE RIGHT BUNDLE BRANCH BLOCK





Electronically Signed On 3- 17:52:11 CDT by Fabio Cooper MD





























DICTATED and SIGNED BY:     FABIO COOPER MD


DATE:     19 0901


Laboratory Tests








Test


 3/21/19


03:05


 


Troponin I Quantitative


 < 0.017 ng/mL


(0.000-0.055)


 


Triglycerides Level


 65 mg/dL


(0-150)


 


Cholesterol Level


 156 mg/dL


(0-200)


 


LDL Cholesterol, Calculated


 104 mg/dL


(0-100)


 


VLDL Cholesterol, Calculated


 13 mg/dL


(0-40)


 


Non-HDL Cholesterol Calculated


 117 mg/dL


(0-129)


 


HDL Cholesterol


 39 mg/dL


(40-60)


 


Cholesterol/HDL Ratio 4.0 











Assessment and Plan


Assessmemt and Plan


Problems


Medical Problems:


(1) Chest pain


Status: Acute  








Past Medical History


Past Medical History:  Other


Additional Past Medical Histor:  chronic back pain


Past Surgical History:  Other


Additional Past Surgical Histo:  back surgery- hardware, inguinal hernia


Alcohol Use:  Rarely


Drug Use:  None





Comment


Review of Relevant


I have reviewed the following items fred (where applicable) has been applied.


Labs





Laboratory Tests








Test


 3/20/19


09:25 3/20/19


09:28 3/21/19


03:05


 


White Blood Count


 5.4 x10^3/uL


(4.0-11.0) 


 





 


Red Blood Count


 4.65 x10^6/uL


(4.30-5.70) 


 





 


Hemoglobin


 13.8 g/dL


(13.0-17.5) 


 





 


Hematocrit


 42.2 %


(39.0-53.0) 


 





 


Mean Corpuscular Volume 91 fL ()   


 


Mean Corpuscular Hemoglobin 30 pg (25-35)   


 


Mean Corpuscular Hemoglobin


Concent 33 g/dL


(31-37) 


 





 


Red Cell Distribution Width


 13.8 %


(11.5-14.5) 


 





 


Platelet Count


 172 x10^3/uL


(140-400) 


 





 


Neutrophils (%) (Auto) 73 % (31-73)   


 


Lymphocytes (%) (Auto) 13 % (24-48)   


 


Monocytes (%) (Auto) 11 % (0-9)   


 


Eosinophils (%) (Auto) 2 % (0-3)   


 


Basophils (%) (Auto) 1 % (0-3)   


 


Neutrophils # (Auto)


 4.0 x10^3uL


(1.8-7.7) 


 





 


Lymphocytes # (Auto)


 0.7 x10^3/uL


(1.0-4.8) 


 





 


Monocytes # (Auto)


 0.6 x10^3/uL


(0.0-1.1) 


 





 


Eosinophils # (Auto)


 0.1 x10^3/uL


(0.0-0.7) 


 





 


Basophils # (Auto)


 0.0 x10^3/uL


(0.0-0.2) 


 





 


Prothrombin Time


 12.6 SEC


(11.7-14.0) 


 





 


Prothromb Time International


Ratio 1.0 (0.8-1.1) 


 


 





 


Activated Partial


Thromboplast Time 37 SEC (24-38) 


 


 





 


Sodium Level


 142 mmol/L


(136-145) 


 





 


Potassium Level


 3.6 mmol/L


(3.5-5.1) 


 





 


Chloride Level


 105 mmol/L


() 


 





 


Carbon Dioxide Level


 29 mmol/L


(21-32) 


 





 


Anion Gap 8 (6-14)   


 


Blood Urea Nitrogen 8 mg/dL (8-26)   


 


Creatinine


 0.8 mg/dL


(0.7-1.3) 


 





 


Estimated GFR


(Cockcroft-Gault) 118.9 


 


 





 


BUN/Creatinine Ratio 10 (6-20)   


 


Glucose Level


 105 mg/dL


(70-99) 


 





 


Calcium Level


 8.4 mg/dL


(8.5-10.1) 


 





 


Magnesium Level


 2.1 mg/dL


(1.8-2.4) 


 





 


Total Bilirubin


 0.2 mg/dL


(0.2-1.0) 


 





 


Aspartate Amino Transf


(AST/SGOT) 15 U/L (15-37) 


 


 





 


Alanine Aminotransferase


(ALT/SGPT) 22 U/L (16-63) 


 


 





 


Alkaline Phosphatase


 88 U/L


() 


 





 


Creatine Kinase


 146 U/L


() 


 





 


Creatine Kinase MB (Mass)


 1.3 ng/mL


(0.0-3.6) 


 





 


Creatine Kinase MB Relative


Index 0.9 % (0-4) 


 


 





 


Troponin I Quantitative


 < 0.017 ng/mL


(0.000-0.055) 


 < 0.017 ng/mL


(0.000-0.055)


 


NT-Pro-B-Type Natriuretic


Peptide 25 pg/mL


(0-124) 


 





 


Total Protein


 7.1 g/dL


(6.4-8.2) 


 





 


Albumin


 3.3 g/dL


(3.4-5.0) 


 





 


Albumin/Globulin Ratio 0.9 (1.0-1.7)   


 


Lipase


 193 U/L


() 


 





 


Thyroid Stimulating Hormone


(TSH) 1.870 uIU/mL


(0.358-3.74) 


 





 


Group A Streptococcus Rapid


 


 Negative


(NEGATIVE) 





 


Triglycerides Level


 


 


 65 mg/dL


(0-150)


 


Cholesterol Level


 


 


 156 mg/dL


(0-200)


 


LDL Cholesterol, Calculated


 


 


 104 mg/dL


(0-100)


 


VLDL Cholesterol, Calculated


 


 


 13 mg/dL


(0-40)


 


Non-HDL Cholesterol Calculated


 


 


 117 mg/dL


(0-129)


 


HDL Cholesterol


 


 


 39 mg/dL


(40-60)


 


Cholesterol/HDL Ratio   4.0 








Laboratory Tests








Test


 3/21/19


03:05


 


Troponin I Quantitative


 < 0.017 ng/mL


(0.000-0.055)


 


Triglycerides Level


 65 mg/dL


(0-150)


 


Cholesterol Level


 156 mg/dL


(0-200)


 


LDL Cholesterol, Calculated


 104 mg/dL


(0-100)


 


VLDL Cholesterol, Calculated


 13 mg/dL


(0-40)


 


Non-HDL Cholesterol Calculated


 117 mg/dL


(0-129)


 


HDL Cholesterol


 39 mg/dL


(40-60)


 


Cholesterol/HDL Ratio 4.0 








Medications





Current Medications


Multi-Ingredient Mouthwash/Gargle (Gi Cocktail) 20 ml 1X  ONCE SWSW  Last 

administered on 3/20/19at 09:36;  Start 3/20/19 at 09:30;  Stop 3/20/19 at 09:31

;  Status DC


Aspirin (Children'S Aspirin) 324 mg 1X  ONCE PO  Last administered on 3/20/19at 

12:58;  Start 3/20/19 at 13:00;  Stop 3/20/19 at 13:01;  Status DC


Hydralazine HCl (Apresoline Inj) 10 mg PRN Q6HRS  PRN IVP ELEVATED BP, SEE 

COMMENTS;  Start 3/20/19 at 15:30


Lisinopril (Prinivil) 10 mg DAILY PO  Last administered on 3/21/19at 08:53;  

Start 3/20/19 at 17:00


Pantoprazole Sodium (Protonix) 40 mg DAILYAC PO  Last administered on 3/21/19at 

08:54;  Start 3/20/19 at 16:30


Oxycodone/ Acetaminophen (Percocet 10/325) 1 tab PRN Q6HRS  PRN PO PAIN Last 

administered on 3/21/19at 11:12;  Start 3/20/19 at 17:15


Trazodone HCl (Desyrel) 100 mg QHS PO  Last administered on 3/20/19at 22:10;  

Start 3/20/19 at 21:00


Guaifenesin (Robitussin Dm) 10 ml PRN Q6HRS  PRN PO COUGH Last administered on 3

/21/19at 05:01;  Start 3/20/19 at 17:30


Diazepam (Valium) 10 mg PRN BID  PRN PO ANXIETY Last administered on 3/21/19at 

01:10;  Start 3/20/19 at 17:30


Promethazine HCl (Phenergan Syrup) 6.25 mg PRN Q6HRS  PRN PEG cough Last 

administered on 3/21/19at 08:54;  Start 3/20/19 at 17:30





Active Scripts


Active


Promethazine-Dm Syrup (D-Methorphan Hb/Prometh Hcl) 118 Ml Syrup 5 Ml PO PRN 

Q4HRS


Reported


Diazepam 10 Mg Tablet 10 Mg PO BID PRN


Trazodone Hcl 100 Mg Tablet 100 Mg PO HS


Vitals/I & O





Vital Sign - Last 24 Hours








 3/20/19 3/20/19 3/20/19 3/20/19





 12:05 12:35 13:30 14:16


 


Temp   98.7 





   98.7 


 


Pulse 65 65 58 


 


Resp 17 18 18 


 


B/P (MAP) 151/67 (95) 141/65 (90) 168/77 (107) 


 


Pulse Ox 98 99 99 


 


O2 Delivery Room Air Room Air Room Air Room Air


 


    





    





 3/20/19 3/20/19 3/20/19 3/20/19





 17:08 17:47 18:47 19:35


 


Pulse 58   


 


Resp  20  


 


B/P (MAP) 168/77   


 


O2 Delivery  Room Air  Room Air


 


O2 Flow Rate   2.0 





 3/20/19 3/20/19 3/21/19 3/21/19





 19:50 23:00 01:10 02:10


 


Temp 98.4 97.9  





 98.4 97.9  


 


Pulse 65 62  


 


Resp 18 17  


 


B/P (MAP) 155/72 (99) 136/63 (87)  


 


Pulse Ox 97 96  


 


O2 Delivery Room Air Room Air Room Air Room Air


 


    





    





 3/21/19 3/21/19 3/21/19 3/21/19





 03:45 07:00 08:00 08:53


 


Temp 98.3 98.6  





 98.3 98.6  


 


Pulse 69 78  78


 


Resp 18 18  


 


B/P (MAP) 136/62 (86) 143/72 (95)  143/72


 


Pulse Ox 96 98  


 


O2 Delivery Room Air Room Air Room Air 


 


O2 Flow Rate   2.0 


 


    





    





 3/21/19   





 10:53   


 


Temp 98.6   





 98.6   


 


Pulse 78   


 


Resp 18   


 


B/P (MAP) 157/74 (101)   


 


Pulse Ox 98   


 


O2 Delivery Room Air   














Intake and Output   


 


 3/20/19 3/20/19 3/21/19





 14:59 22:59 06:59


 


Intake Total  280 ml 400 ml


 


Balance  280 ml 400 ml

















PEBBLES HORTON MD Mar 21, 2019 11:54

## 2019-03-21 NOTE — NUR
SS following up with discharge planning. SS reviewed pt chart. Pt is from home with spouse 
and is currently on room air. No discharge needs noted at this time. SS will continue to 
follow for pending discharge needs.

## 2019-03-21 NOTE — PDOC3
Discharge Summary


Date of Admission:  Mar 20, 2019


Date of Discharge:  Mar 21, 2019


Follow-Up:  3-5 days


Admitting Diagnosis comment:





DISCHARGE DX =================================








Chest pain, 


non-cardiac chest pain. Normal trops, echo and EKG








admitted. 





serial enzymes, 


serial EKGs, 


cardiac monitoring.  CVC


DVT prophylaxis.  


Home meds.  


 Cardiology.OK WITH D/C TODAY, SEE PCP ASAP





Vitals


Vitals





Vital Signs








  Date Time  Temp Pulse Resp B/P (MAP) Pulse Ox O2 Delivery O2 Flow Rate FiO2


 


3/21/19 10:53 98.6 78 18 157/74 (101) 98 Room Air  





 98.6       


 


3/21/19 08:00       2.0 











Physical Exam


Physical Exam


Lungs & Thorax:  Bilateral breath sounds clear to auscultation []


Abdomen: Bowel sounds normal, soft, no tenderness, no masses, no pulsatile 

masses. [] 


Skin: Warm, dry, no erythema, no rash. [] 


Back: No tenderness, no CVA tenderness. [] 


Extremities: No tenderness, no cyanosis, no clubbing, ROM intact, no edema. [] 


Neurologic: Alert and oriented X 3, normal motor function, normal sensory 

function, no focal deficits noted. []


Psychologic: Affect normal, judgement normal, mood normal. []


General:  Alert, Oriented X3, Cooperative, No acute distress


Heart:  Regular rate (SR), Normal S1, Normal S2, Other (2/6 systolic murmur to 

LLS border)


Lungs:  Clear


Abdomen:  Soft, No tenderness


Extremities:  No cyanosis, No edema


Skin:  No breakdown, No significant lesion





Labs


LABS


                        [address] [address]


Test Date:    2019               Test Time:    09:01:32


Pat Name:     OSMIN BARRAZA           Department:   


Patient ID:   [id number]           Room:          


Gender:       M                        Technician:   SC


:          1955               Requested By: SVETLANA ZAMARRIPA


Order Number: [id number]           Reading MD:   Fabio Cooper MD


                                 Measurements


Intervals                              Axis          


Rate:         66                       P:            34


NH:           172                      QRS:          92


QRSD:         100                      T:            28


QT:           360                                    


QTc:          379                                    


                           Interpretive Statements


SINUS RHYTHM


INCOMPLETE RIGHT BUNDLE BRANCH BLOCK





Electronically Signed On 3- 17:52:11 CDT by Fabio Cooper MD





























DICTATED and SIGNED BY:     FABIO COOPER MD


DATE:     19


Laboratory Tests








Test


 3/21/19


03:05


 


Troponin I Quantitative


 < 0.017 ng/mL


(0.000-0.055)


 


Triglycerides Level


 65 mg/dL


(0-150)


 


Cholesterol Level


 156 mg/dL


(0-200)


 


LDL Cholesterol, Calculated


 104 mg/dL


(0-100)


 


VLDL Cholesterol, Calculated


 13 mg/dL


(0-40)


 


Non-HDL Cholesterol Calculated


 117 mg/dL


(0-129)


 


HDL Cholesterol


 39 mg/dL


(40-60)


 


Cholesterol/HDL Ratio 4.0 











Assessment and Plan


Assessmemt and Plan


Problems


Medical Problems:


(1) Chest pain


Status: Acute  








Past Medical History


Past Medical History:  Other


Additional Past Medical Histor:  chronic back pain


Past Surgical History:  Other


Additional Past Surgical Histo:  back surgery- hardware, inguinal hernia


Alcohol Use:  Rarely


FINAL DIAGNOSIS


Problems


Medical Problems:


(1) Chest pain


Status: Acute  








Brief Hospital Course


Mr. Barraza  is a 63 old [sex] who presented with [CHEST PAIN ]


CONDITION AT DISCHARGE:  Improved


Discharge Medications





Current Medications


Multi-Ingredient Mouthwash/Gargle (Gi Cocktail) 20 ml 1X  ONCE SWSW  Last 

administered on 3/20/19at 09:36;  Start 3/20/19 at 09:30;  Stop 3/20/19 at 09:31

;  Status DC


Aspirin (Children'S Aspirin) 324 mg 1X  ONCE PO  Last administered on 3/20/19at 

12:58;  Start 3/20/19 at 13:00;  Stop 3/20/19 at 13:01;  Status DC


Hydralazine HCl (Apresoline Inj) 10 mg PRN Q6HRS  PRN IVP ELEVATED BP, SEE 

COMMENTS;  Start 3/20/19 at 15:30


Lisinopril (Prinivil) 10 mg DAILY PO  Last administered on 3/21/19at 08:53;  

Start 3/20/19 at 17:00


Pantoprazole Sodium (Protonix) 40 mg DAILYAC PO  Last administered on 3/21/19at 

08:54;  Start 3/20/19 at 16:30


Oxycodone/ Acetaminophen (Percocet 10/325) 1 tab PRN Q6HRS  PRN PO PAIN Last 

administered on 3/21/19at 11:12;  Start 3/20/19 at 17:15


Trazodone HCl (Desyrel) 100 mg QHS PO  Last administered on 3/20/19at 22:10;  

Start 3/20/19 at 21:00


Guaifenesin (Robitussin Dm) 10 ml PRN Q6HRS  PRN PO COUGH Last administered on 3

/21/19at 05:01;  Start 3/20/19 at 17:30


Diazepam (Valium) 10 mg PRN BID  PRN PO ANXIETY Last administered on 3/21/19at 

01:10;  Start 3/20/19 at 17:30


Promethazine HCl (Phenergan Syrup) 6.25 mg PRN Q6HRS  PRN PEG cough Last 

administered on 3/21/19at 08:54;  Start 3/20/19 at 17:30;  Stop 3/21/19 at 13:17

;  Status DC


Promethazine HCl (Phenergan Syrup) 6.25 mg PRN Q6HRS  PRN PO cough;  Start 3/21/

19 at 13:17





Active Scripts


Active


Promethazine-Dm Syrup (D-Methorphan Hb/Prometh Hcl) 118 Ml Syrup 5 Ml PO PRN 

Q4HRS


Reported


Diazepam 10 Mg Tablet 10 Mg PO BID PRN


Trazodone Hcl 100 Mg Tablet 100 Mg PO HS


Vital Signs





Vital Signs








  Date Time  Temp Pulse Resp B/P (MAP) Pulse Ox O2 Delivery O2 Flow Rate FiO2


 


3/21/19 12:50      Room Air  


 


3/21/19 10:53 98.6 78 18 157/74 (101) 98   





 98.6       


 


3/21/19 08:00       2.0 








Labs





Laboratory Tests








Test


 3/20/19


09:25 3/20/19


09:28 3/21/19


03:05


 


White Blood Count


 5.4 x10^3/uL


(4.0-11.0) 


 





 


Red Blood Count


 4.65 x10^6/uL


(4.30-5.70) 


 





 


Hemoglobin


 13.8 g/dL


(13.0-17.5) 


 





 


Hematocrit


 42.2 %


(39.0-53.0) 


 





 


Mean Corpuscular Volume 91 fL ()   


 


Mean Corpuscular Hemoglobin 30 pg (25-35)   


 


Mean Corpuscular Hemoglobin


Concent 33 g/dL


(31-37) 


 





 


Red Cell Distribution Width


 13.8 %


(11.5-14.5) 


 





 


Platelet Count


 172 x10^3/uL


(140-400) 


 





 


Neutrophils (%) (Auto) 73 % (31-73)   


 


Lymphocytes (%) (Auto) 13 % (24-48)   


 


Monocytes (%) (Auto) 11 % (0-9)   


 


Eosinophils (%) (Auto) 2 % (0-3)   


 


Basophils (%) (Auto) 1 % (0-3)   


 


Neutrophils # (Auto)


 4.0 x10^3uL


(1.8-7.7) 


 





 


Lymphocytes # (Auto)


 0.7 x10^3/uL


(1.0-4.8) 


 





 


Monocytes # (Auto)


 0.6 x10^3/uL


(0.0-1.1) 


 





 


Eosinophils # (Auto)


 0.1 x10^3/uL


(0.0-0.7) 


 





 


Basophils # (Auto)


 0.0 x10^3/uL


(0.0-0.2) 


 





 


Prothrombin Time


 12.6 SEC


(11.7-14.0) 


 





 


Prothromb Time International


Ratio 1.0 (0.8-1.1) 


 


 





 


Activated Partial


Thromboplast Time 37 SEC (24-38) 


 


 





 


Sodium Level


 142 mmol/L


(136-145) 


 





 


Potassium Level


 3.6 mmol/L


(3.5-5.1) 


 





 


Chloride Level


 105 mmol/L


() 


 





 


Carbon Dioxide Level


 29 mmol/L


(21-32) 


 





 


Anion Gap 8 (6-14)   


 


Blood Urea Nitrogen 8 mg/dL (8-26)   


 


Creatinine


 0.8 mg/dL


(0.7-1.3) 


 





 


Estimated GFR


(Cockcroft-Gault) 118.9 


 


 





 


BUN/Creatinine Ratio 10 (6-20)   


 


Glucose Level


 105 mg/dL


(70-99) 


 





 


Calcium Level


 8.4 mg/dL


(8.5-10.1) 


 





 


Magnesium Level


 2.1 mg/dL


(1.8-2.4) 


 





 


Total Bilirubin


 0.2 mg/dL


(0.2-1.0) 


 





 


Aspartate Amino Transf


(AST/SGOT) 15 U/L (15-37) 


 


 





 


Alanine Aminotransferase


(ALT/SGPT) 22 U/L (16-63) 


 


 





 


Alkaline Phosphatase


 88 U/L


() 


 





 


Creatine Kinase


 146 U/L


() 


 





 


Creatine Kinase MB (Mass)


 1.3 ng/mL


(0.0-3.6) 


 





 


Creatine Kinase MB Relative


Index 0.9 % (0-4) 


 


 





 


Troponin I Quantitative


 < 0.017 ng/mL


(0.000-0.055) 


 < 0.017 ng/mL


(0.000-0.055)


 


NT-Pro-B-Type Natriuretic


Peptide 25 pg/mL


(0-124) 


 





 


Total Protein


 7.1 g/dL


(6.4-8.2) 


 





 


Albumin


 3.3 g/dL


(3.4-5.0) 


 





 


Albumin/Globulin Ratio 0.9 (1.0-1.7)   


 


Lipase


 193 U/L


() 


 





 


Thyroid Stimulating Hormone


(TSH) 1.870 uIU/mL


(0.358-3.74) 


 





 


Group A Streptococcus Rapid


 


 Negative


(NEGATIVE) 





 


Triglycerides Level


 


 


 65 mg/dL


(0-150)


 


Cholesterol Level


 


 


 156 mg/dL


(0-200)


 


LDL Cholesterol, Calculated


 


 


 104 mg/dL


(0-100)


 


VLDL Cholesterol, Calculated


 


 


 13 mg/dL


(0-40)


 


Non-HDL Cholesterol Calculated


 


 


 117 mg/dL


(0-129)


 


HDL Cholesterol


 


 


 39 mg/dL


(40-60)


 


Cholesterol/HDL Ratio   4.0 








Laboratory Tests








Test


 3/21/19


03:05


 


Troponin I Quantitative


 < 0.017 ng/mL


(0.000-0.055)


 


Triglycerides Level


 65 mg/dL


(0-150)


 


Cholesterol Level


 156 mg/dL


(0-200)


 


LDL Cholesterol, Calculated


 104 mg/dL


(0-100)


 


VLDL Cholesterol, Calculated


 13 mg/dL


(0-40)


 


Non-HDL Cholesterol Calculated


 117 mg/dL


(0-129)


 


HDL Cholesterol


 39 mg/dL


(40-60)


 


Cholesterol/HDL Ratio 4.0 








Allergies





 Allergies








Coded Allergies Type Severity Reaction Last Updated Verified


 


  No Known Drug Allergies    18 No








Disposition/Orders:  D/C to Home


Patient Instructions


D/C PLANNING 35 MIN











PEBBLES HORTON MD Mar 21, 2019 14:57

## 2019-03-21 NOTE — DISCH
DISCHARGE INSTRUCTIONS


Condition on Discharge


Condition on Discharge:  Stable





Activity After Discharge


Activity Instructions for Disc:  Activity as tolerated


Bathing Instructions:  Shower-keep dressing dry


Lifting Instructions after Dis:  No heavy lifting, No pulling or pushing, Do 

not lift >10 pounds


Exercise Instruction after Dis:  Exercise per therapy, Progress as tolerated


Driving Instructions after Dis:  Do not drive


Weight Bearing Status after Di:  As tolerated





Diet after Discharge


Diet after Discharge:  Cardiac, Regular


Diet Texture:  Regular





Wound Incision Care


Wound/Incision Care:  Ice to area for comfort





Checks after Discharge


Checks after discharge:  Check your Temp as needed





Contacting the DR. after DC


Call your doctor for:  Concerns you may have





Treatment/Equipment after DC


Adaptive Equipment Issued:  None











PEBBLES HORTON MD Mar 21, 2019 15:00

## 2020-03-19 ENCOUNTER — HOSPITAL ENCOUNTER (OUTPATIENT)
Dept: HOSPITAL 61 - ENDOS | Age: 65
Discharge: HOME | End: 2020-03-19
Attending: INTERNAL MEDICINE
Payer: MEDICARE

## 2020-03-19 VITALS — DIASTOLIC BLOOD PRESSURE: 76 MMHG | SYSTOLIC BLOOD PRESSURE: 145 MMHG

## 2020-03-19 DIAGNOSIS — G47.33: ICD-10-CM

## 2020-03-19 DIAGNOSIS — Z80.0: ICD-10-CM

## 2020-03-19 DIAGNOSIS — K29.50: ICD-10-CM

## 2020-03-19 DIAGNOSIS — Z98.890: ICD-10-CM

## 2020-03-19 DIAGNOSIS — K64.0: ICD-10-CM

## 2020-03-19 DIAGNOSIS — Z72.0: ICD-10-CM

## 2020-03-19 DIAGNOSIS — K57.30: ICD-10-CM

## 2020-03-19 DIAGNOSIS — K44.9: ICD-10-CM

## 2020-03-19 DIAGNOSIS — D12.3: ICD-10-CM

## 2020-03-19 DIAGNOSIS — D12.5: ICD-10-CM

## 2020-03-19 DIAGNOSIS — Z79.899: ICD-10-CM

## 2020-03-19 DIAGNOSIS — K63.89: ICD-10-CM

## 2020-03-19 DIAGNOSIS — Z86.010: ICD-10-CM

## 2020-03-19 DIAGNOSIS — D50.9: Primary | ICD-10-CM

## 2020-03-19 PROCEDURE — 45385 COLONOSCOPY W/LESION REMOVAL: CPT

## 2020-03-19 PROCEDURE — 45384 COLONOSCOPY W/LESION REMOVAL: CPT

## 2020-03-19 PROCEDURE — 88305 TISSUE EXAM BY PATHOLOGIST: CPT

## 2020-03-19 PROCEDURE — 43235 EGD DIAGNOSTIC BRUSH WASH: CPT

## 2020-03-19 RX ADMIN — SODIUM CHLORIDE, SODIUM LACTATE, POTASSIUM CHLORIDE, AND CALCIUM CHLORIDE SCH MLS/HR: .6; .31; .03; .02 INJECTION, SOLUTION INTRAVENOUS at 12:19

## 2020-03-20 NOTE — PATHOLOGY
[facility] Accession Number: [id number]

.                                                                01

Material submitted:                                        .

PART A: colon - SIGMOID POLYPECTOMY. Modifiers: sigmoid

PART B: colon - TRANSVERSE COLON POLYP. Modifiers: transverse

.                                                                01

Clinical history:                                          .

Anemia; history of polyps; weight loss

.                                                                02

**********************************************************************

Diagnosis:

A.  Sigmoid colon polypectomy:

- Tubular adenoma.

.

B.  Colon biopsy, transverse colon polyp:

- Tubular adenoma.

.

(JPM:mml; 03/20/2020)

[facility]  03/20/2020  1346 Local

**********************************************************************

.                                                                02

Comment:

There is no high grade dysplasia or evidence of malignancy.

.

(JPM:mml; 03/20/2020)

.                                                                02

Electronically signed:                                     .

James Howard MD, Pathologist

NPI- [id number]

.                                                                01

Gross description:                                         .

A.  The specimen is received in formalin, labeled "James Hernandez, sigmoid

polypectomy".  Received is a segment of red-brown polypoid tissue

measuring 0.8 x 0.8 x 0.5 cm in greatest dimensions.  The surgical margin

is inked.  The specimen is bisected and entirely submitted in cassette A1.

.

B. The specimen is received in formalin, labeled " David,

transverse colon polyp".  Received is a segment of pale tan soft tissue

measuring 0.6 cm in maximum dimensions.  The specimen is submitted

entirely in cassette B1.

([facility]; 3/19/2020)

QAC/QAC  03/19/2020  1936 Local

.                                                                02

Pathologist provided ICD-10:

D12.5, D12.3

.                                                                02

CPT                                                        .

437817, [id number]

Specimen Comment: A courtesy copy of this report has been sent to [phone]

Specimen Comment: Report sent to 

***Performed at:  01

   [facility]

   [address], [place], [place]  [id number]

   MD Darinel Graves MD Phone:  [phone]

***Performed at:  02

   [facility] [address] [place], [place]  [id number]

   MD James Howard MD Phone:  [phone]

## 2020-10-09 ENCOUNTER — HOSPITAL ENCOUNTER (EMERGENCY)
Dept: HOSPITAL 61 - ER | Age: 65
Discharge: HOME | End: 2020-10-09
Payer: COMMERCIAL

## 2020-10-09 VITALS
SYSTOLIC BLOOD PRESSURE: 155 MMHG | DIASTOLIC BLOOD PRESSURE: 72 MMHG | DIASTOLIC BLOOD PRESSURE: 72 MMHG | SYSTOLIC BLOOD PRESSURE: 155 MMHG

## 2020-10-09 VITALS — WEIGHT: 165.35 LBS | BODY MASS INDEX: 25.95 KG/M2 | HEIGHT: 67 IN

## 2020-10-09 DIAGNOSIS — M54.5: ICD-10-CM

## 2020-10-09 DIAGNOSIS — Z20.828: ICD-10-CM

## 2020-10-09 DIAGNOSIS — Z98.890: ICD-10-CM

## 2020-10-09 DIAGNOSIS — R05: Primary | ICD-10-CM

## 2020-10-09 DIAGNOSIS — F17.200: ICD-10-CM

## 2020-10-09 DIAGNOSIS — G89.29: ICD-10-CM

## 2020-10-09 DIAGNOSIS — R07.89: ICD-10-CM

## 2020-10-09 LAB
ALBUMIN SERPL-MCNC: 3.5 G/DL (ref 3.4–5)
ALBUMIN/GLOB SERPL: 0.9 {RATIO} (ref 1–1.7)
ALP SERPL-CCNC: 68 U/L (ref 46–116)
ALT SERPL-CCNC: 18 U/L (ref 16–63)
ANION GAP SERPL CALC-SCNC: 6 MMOL/L (ref 6–14)
AST SERPL-CCNC: 15 U/L (ref 15–37)
BASOPHILS # BLD AUTO: 0.1 X10^3/UL (ref 0–0.2)
BASOPHILS NFR BLD: 1 % (ref 0–3)
BILIRUB SERPL-MCNC: 0.4 MG/DL (ref 0.2–1)
BUN SERPL-MCNC: 10 MG/DL (ref 8–26)
BUN/CREAT SERPL: 11 (ref 6–20)
CALCIUM SERPL-MCNC: 9 MG/DL (ref 8.5–10.1)
CHLORIDE SERPL-SCNC: 106 MMOL/L (ref 98–107)
CO2 SERPL-SCNC: 30 MMOL/L (ref 21–32)
CREAT SERPL-MCNC: 0.9 MG/DL (ref 0.7–1.3)
EOSINOPHIL NFR BLD: 0.2 X10^3/UL (ref 0–0.7)
EOSINOPHIL NFR BLD: 3 % (ref 0–3)
ERYTHROCYTE [DISTWIDTH] IN BLOOD BY AUTOMATED COUNT: 13.5 % (ref 11.5–14.5)
GFR SERPLBLD BASED ON 1.73 SQ M-ARVRAT: 103.1 ML/MIN
GLUCOSE SERPL-MCNC: 101 MG/DL (ref 70–99)
HCT VFR BLD CALC: 41 % (ref 39–53)
HGB BLD-MCNC: 14.1 G/DL (ref 13–17.5)
LIPASE: 80 U/L (ref 73–393)
LYMPHOCYTES # BLD: 1.3 X10^3/UL (ref 1–4.8)
LYMPHOCYTES NFR BLD AUTO: 23 % (ref 24–48)
MCH RBC QN AUTO: 32 PG (ref 25–35)
MCHC RBC AUTO-ENTMCNC: 34 G/DL (ref 31–37)
MCV RBC AUTO: 92 FL (ref 79–100)
MONO #: 0.3 X10^3/UL (ref 0–1.1)
MONOCYTES NFR BLD: 5 % (ref 0–9)
NEUT #: 3.8 X10^3/UL (ref 1.8–7.7)
NEUTROPHILS NFR BLD AUTO: 68 % (ref 31–73)
PLATELET # BLD AUTO: 248 X10^3/UL (ref 140–400)
POTASSIUM SERPL-SCNC: 3.7 MMOL/L (ref 3.5–5.1)
PROT SERPL-MCNC: 7.4 G/DL (ref 6.4–8.2)
RBC # BLD AUTO: 4.45 X10^6/UL (ref 4.3–5.7)
SODIUM SERPL-SCNC: 142 MMOL/L (ref 136–145)
WBC # BLD AUTO: 5.6 X10^3/UL (ref 4–11)

## 2020-10-09 PROCEDURE — 36415 COLL VENOUS BLD VENIPUNCTURE: CPT

## 2020-10-09 PROCEDURE — 99285 EMERGENCY DEPT VISIT HI MDM: CPT

## 2020-10-09 PROCEDURE — 96374 THER/PROPH/DIAG INJ IV PUSH: CPT

## 2020-10-09 PROCEDURE — 71045 X-RAY EXAM CHEST 1 VIEW: CPT

## 2020-10-09 PROCEDURE — 96375 TX/PRO/DX INJ NEW DRUG ADDON: CPT

## 2020-10-09 PROCEDURE — 83690 ASSAY OF LIPASE: CPT

## 2020-10-09 PROCEDURE — 85025 COMPLETE CBC W/AUTO DIFF WBC: CPT

## 2020-10-09 PROCEDURE — 84484 ASSAY OF TROPONIN QUANT: CPT

## 2020-10-09 PROCEDURE — 80053 COMPREHEN METABOLIC PANEL: CPT

## 2020-10-09 NOTE — PHYS DOC
Past Medical History


Past Medical History:  Other


Additional Past Medical Histor:  CHRONIC PAIN, RLS


Past Surgical History:  Hip Replacement


Additional Past Surgical Histo:  BACK, HERNIA


Smoking Status:  Current Every Day Smoker


Alcohol Use:  Rarely


Drug Use:  None





General Adult


EDM:


Chief Complaint:  CHEST PAIN





HPI:


HPI:





Patient history obtained from the patient.  Patient is a 65-year-old male repo

rted past medical history of chronic low back pain who presents with chief 

complaint of chest pain and cough.  He states he is had constant chest pain over

the past week.  He states pain is made worse with coughing.  He states pain is 

an aching pain.  Denies any radiation of his symptoms.  Notes productive sputum.

 Denies objective fevers.  States he has been exposed to coronavirus.  Denies 

syncope.  Denies any history of coronary artery disease.  States he used to 

smoke cigarettes but quit years ago.  Denies any daily alcohol use.  Notes 

occasional marijuana usage.  Denies any exertional component to his symptoms.  

Has tried NyQuil, Tylenol, and cough medicine with minimal relief.  States it is

difficult to sleep at night due to his cough.  Does think that he has had a 

stress test in the past that he thinks was normal.  Does follow with primary 

care physician but states that his doctor was out of the office this week.  

Denies associated diaphoresis.  Denies nausea or vomiting.  No other complaints.





Review of Systems:


Review of Systems:


Constitutional:   Denies fever or chills. []


Eyes:   Denies change in visual acuity. []


HENT:   Denies nasal congestion or sore throat. [] 


Respiratory:   Positive for cough


Cardiovascular:   Positive for chest pain


GI:   Denies abdominal pain, nausea, vomiting, bloody stools or diarrhea. [] 


:  Denies dysuria. [] 


Musculoskeletal:   Denies back pain or joint pain. [] 


Integument:   Denies rash. [] 


Neurologic:   Denies headache, focal weakness or sensory changes. [] 


Endocrine:   Denies polyuria or polydipsia. [] 


Lymphatic:  Denies swollen glands. [] 


Psychiatric:  Denies depression or anxiety. []





Heart Score:


HEART Score for Chest Pain:  








HEART Score for Chest Pain Response (Comments) Value


 


History Slighlty/Non-Suspicious 0


 


ECG Normal 0


 


Age >45 - < 65 1


 


Risk Factors                            1 or 2 Risk Factors 1


 


Troponin < Normal Limit 0


 


Total  2








Risk Factors:


Risk Factors:  DM, Current or recent (<one month) smoker, HTN, HLP, family 

history of CAD, obesity.


Risk Scores:


Score 0 - 3:  2.5% MACE over next 6 weeks - Discharge Home


Score 4 - 6:  20.3% MACE over next 6 weeks - Admit for Clinical Observation


Score 7 - 10:  72.7% MACE over next 6 weeks - Early Invasive Strategies





Allergies:


Allergies:





Allergies








Coded Allergies Type Severity Reaction Last Updated Verified


 


  No Known Drug Allergies    3/19/20 No











Physical Exam:


PE:





Constitutional: Well developed, well nourished, no acute distress, non-toxic 

appearance. []


HENT: Normocephalic, atraumatic, bilateral external ears normal, oropharynx 

moist, no oral exudates, nose normal. []


Eyes: PERRLA, EOMI, conjunctiva normal, no discharge. [] 


Neck: Normal range of motion, no tenderness, supple, no stridor. [] 


Cardiovascular:Heart rate regular rhythm, no murmur []


Lungs & Thorax:  Bilateral breath sounds clear to auscultation []


Abdomen:  soft, no tenderness, no masses, no pulsatile masses. [] 


Skin: Warm, dry, no erythema, no rash. [] 


Back: No tenderness, no CVA tenderness. [] 


Extremities: No tenderness, no cyanosis, no clubbing, ROM intact, no edema. [] 


Neurologic: Alert and oriented X 3, normal motor function, normal sensory 

function, no focal deficits noted. []


Psychologic: Affect normal, judgement normal, mood normal. []





Current Patient Data:


Labs:





Laboratory Tests








Test


 10/9/20


15:35


 


White Blood Count 5.6 x10^3/uL 


 


Red Blood Count 4.45 x10^6/uL 


 


Hemoglobin 14.1 g/dL 


 


Hematocrit 41.0 % 


 


Mean Corpuscular Volume 92 fL 


 


Mean Corpuscular Hemoglobin 32 pg 


 


Mean Corpuscular Hemoglobin


Concent 34 g/dL 





 


Red Cell Distribution Width 13.5 % 


 


Platelet Count 248 x10^3/uL 


 


Neutrophils (%) (Auto) 68 % 


 


Lymphocytes (%) (Auto) 23 % 


 


Monocytes (%) (Auto) 5 % 


 


Eosinophils (%) (Auto) 3 % 


 


Basophils (%) (Auto) 1 % 


 


Neutrophils # (Auto) 3.8 x10^3/uL 


 


Lymphocytes # (Auto) 1.3 x10^3/uL 


 


Monocytes # (Auto) 0.3 x10^3/uL 


 


Eosinophils # (Auto) 0.2 x10^3/uL 


 


Basophils # (Auto) 0.1 x10^3/uL 


 


Sodium Level 142 mmol/L 


 


Potassium Level 3.7 mmol/L 


 


Chloride Level 106 mmol/L 


 


Carbon Dioxide Level 30 mmol/L 


 


Anion Gap 6 


 


Blood Urea Nitrogen 10 mg/dL 


 


Creatinine 0.9 mg/dL 


 


Estimated GFR


(Cockcroft-Gault) 103.1 





 


BUN/Creatinine Ratio 11 


 


Glucose Level 101 mg/dL 


 


Calcium Level 9.0 mg/dL 


 


Total Bilirubin 0.4 mg/dL 


 


Aspartate Amino Transf


(AST/SGOT) 15 U/L 





 


Alanine Aminotransferase


(ALT/SGPT) 18 U/L 





 


Alkaline Phosphatase 68 U/L 


 


Troponin I Quantitative < 0.017 ng/mL 


 


Total Protein 7.4 g/dL 


 


Albumin 3.5 g/dL 


 


Albumin/Globulin Ratio 0.9 


 


Lipase 80 U/L 








Current Medications








 Medications


  (Trade)  Dose


 Ordered  Sig/Andrew


 Route


 PRN Reason  Start Time


 Stop Time Status Last Admin


Dose Admin


 


 Ketorolac


 Tromethamine


  (Toradol 15mg


 Vial)  15 mg  1X  ONCE


 IVP


   10/9/20 16:00


 10/9/20 16:01 DC  





 


 Ondansetron HCl


  (Zofran)  4 mg  1X  ONCE


 IVP


   10/9/20 16:00


 10/9/20 16:01 DC  





 


 Benzonatate


  (Tessalon Perle)  100 mg  1X  ONCE


 PO


   10/9/20 16:00


 10/9/20 16:01 DC  














EKG:


EKG:


EKG consistent with normal sinus rhythm.  Ventricular rate is 60 bpm.  Axis 

normal.  Intervals normal.  No acute ischemic changes noted.  []





Radiology/Procedures:


Radiology/Procedures:


[facility]


                    [address] [place] [address]


                                 [phone]


                                        


                                 IMAGING REPORT





                                     Signed





PATIENT: OSMIN BARRAZA ACCOUNT: [id number]     MRN#: [id number]


: 1955           LOCATION: ER              AGE: 65


SEX: M                    EXAM DT: 10/09/20         ACCESSION#: [id number]


STATUS: PRE ER            ORD. PHYSICIAN: CASTILLO AGUAYO DO


REASON: CP  7


PROCEDURE: CHEST AP ONLY





Single view chest dated 10/9/2020.


 


Comparison made to 3/20/2019.


 


CLINICAL INDICATION: Chest pain.


 


FINDINGS:


 


Single upright portable exam performed. Heart and mediastinal contours are


stable. Lungs are clear. No consolidation or pleural effusion. Calcified 


granuloma the left base.


 


IMPRESSION:


 


No acute radiographic normality.


 


Electronically signed by: Doron Dickinson MD (10/9/2020 4:23 PM) 


[facility] – [place]














DICTATED and SIGNED BY:     DORON DICKINSON MD


DATE:     10/09/20 1623


[]





Course & Med Decision Making:


Course & Med Decision Making


Pertinent Labs and Imaging studies reviewed. (See chart for details)





[] Patient is a 65-year-old male who presents with chief complaint of constant 

chest pain for the past week associated with cough.  Initial vital signs 

unremarkable.  EKG without acute ischemic changes.  Troponin negative.  Chest x-

ray nonacute.  COVID swab obtained and pending.  Overall I do have low suspicion

 for ACS.  Low risk heart score by my estimation.  He states he does have 

follow-up with his primary care physician in the next 3 days.  States his 

greatest concern is getting cough medicine.  I did give him instructions to self

 quarantine at home until his COVID results return.  Overall low suspicion for 

pulmonary embolism.  Strict return precautions were discussed and understood.  

Stable for discharge home.





COVID-19 CRITERIA:    The patient was evaluated during the global COVID-19 

pandemic, and that diagnosis was suspected/considered upon their initial 

presentation.  Their evaluation, treatment and testing was consistent with 

current guidelines for patients who present with complaints or symptoms that may

 be related to COVID-19.





Dragon Disclaimer:


Dragon Disclaimer:


This electronic medical record was generated, in whole or in part, using a voice

 recognition dictation system.





Departure


Departure


Impression:  


   Primary Impression:  


   Cough


   Additional Impression:  


   Chest pain


   Qualified Codes:  R07.9 - Chest pain, unspecified


Disposition:  01 DC HOME SELF CARE/HOMELESS


Condition:  STABLE


Referrals:  


SHAINA HAYES MD (PCP)


Patient Instructions:  Chest Pain (Nonspecific)-Brief





Additional Instructions:  


You have been tested for or diagnosed with COVID-19. It is an infection caused 

by a new type 


of coronavirus. COVID-19 will cause cold-like or mild flu symptoms in most. It 

can cause 


more severe symptoms like problems breathing in some.





There is no treatment for COVID-19. The body will clear the infection over time.

 Self-care 


will help to ease discomfort.





Steps to Take:


Self-Care


Rest as needed. Healthy habits may help you feel better. Steps include:





Choose healthy foods including fruits and vegetables. Drink water throughout the

 day.


Get plenty of sleep each night.


If you smoke, try to quit. It may ease breathing.


Avoid alcohol.


Keep Others Healthy


The virus can spread to others. Droplets are released every time you sneeze or 

cough. The 


droplets can get into the mouth, nose, or eyes of people near you and lead to 

infection. To 


lower the chances of spreading COVID-19 to others:





Stay at home until your doctor has said it is safe to leave. If you tested 

positive this 


will mean staying isolated until both of the following are true:





At least 7 days have passed since the start of illness.


You are free of fever for at least 72 hours without the use of medicine.


During this time:





 - Avoid public areas, events, or transportation. Do not return to work or 

school until your 


doctor has said it is safe to do so.


 - Call ahead if you need to go to a medical center. Let them know you may have 

COVID-19. It 


will help them guide you where to go. They may also ask you to wear a facemask 

when you come 


to the office.


 - If you call for emergency medical services, let them know you may have COVID-

19.


While at home:





 - Try to avoid close contact with others. Stay about 6 feet away.


 - If possible, spend most of your time in a separate room from others.


 - Use a face mask if you will be in close contact with others such as sharing a

 room or 


vehicle.


 - Have someone wipe down common surfaces in the home. Use household  

every day on 


areas like doorknobs, counters, or sinks.


 - Cough or sneeze into a tissue. Throw the tissue away right after use. If a 

tissue is not 


available, cough or sneeze into your elbow.


 - Wash your hands often. Wash them after sneezing or coughing. Use soap and 

water and wash 


for at least 20 seconds. Alcohol based hand  can be used if soap and 

water is not 


available.


 - Do not prepare food for others. Avoid sharing personal items like forks, 

spoons, or 


toothbrushes.


 - Avoid close contact with pets while you are sick. There is no evidence of the

 virus 


passing to pets. This is a safety step until more is known about this virus.


Isolation can be frustrating. Social interaction can help. Keep in touch with 

friends and 


family through phone and tech options. You can still interact with others in 

your home, just 


keep a safe distance of about 6 feet.





Follow-up:


Your doctors office will check in with you to see if there are any changes in 

your health. 


You may be asked to keep track of symptoms to share with them. They will also 

let you know 


when you are clear to be in public again.





Problems to Look Out For:


Contact your doctor if your recovery is not going as you expect. Get emergency 

care if you 


have problems such as:





 - Trouble breathing


 - Nonstop chest pain or pressure


 - Changes in awareness, confusion, or problems waking


 - Lips or face have bluish color


 - Worsening of symptoms


If you think you have an emergency, call for emergency medical services right 

away.





As taken from [employer]


Scripts


Benzonatate (TESSALON PERLE) 100 Mg Capsule


100 MG PO TID PRN for COUGH for 4 Days, #12 CAP


   Prov: CASTILLO AGUAYO DO         10/9/20 


Guaifenesin (MUCINEX) 600 Mg Tablet.er


1 TAB PO BID for cough for 5 Days, #10 TAB 0 Refills


   Prov: CASTILLO AGUAYO DO         10/9/20











CASTILLO AGUAYO DO           Oct 9, 2020 15:54

## 2021-01-26 ENCOUNTER — HOSPITAL ENCOUNTER (OUTPATIENT)
Dept: HOSPITAL 61 - CT | Age: 66
End: 2021-01-26
Attending: FAMILY MEDICINE
Payer: COMMERCIAL

## 2021-01-26 DIAGNOSIS — K57.30: Primary | ICD-10-CM

## 2021-01-26 PROCEDURE — 72193 CT PELVIS W/DYE: CPT

## 2021-01-26 NOTE — RAD
EXAMINATION: CT PELVIS W



CLINICAL HISTORY: Inguinal hernia repair.



TECHNIQUE: Routine CT of the pelvis following administration of intravenous contrast.  



CT Dose Reduction Employed: One or more of the following individualized dose reduction techniques wer
e utilized for this examination:  1. Automated exposure control  2. Adjustment of the mA and/or kV ac
cording to patient size  3. Use of iterative reconstruction technique.



COMPARISON: CT abdomen/pelvis 1/2/2020





FINDINGS:



Extensive beam hardening artifact related to bilateral total hip arthroplasties significantly limits 
evaluation of the pelvic viscera. 



Similar-appearing postoperative changes related to left inguinal hernia repair. No evidence of recurr
ent inguinal hernia. Partially visualized small fat-containing umbilical hernia.



Nondiagnostic evaluation of the urinary bladder and prostate secondary to artifact.



Partially visualized diverticulosis in the distal descending and proximal sigmoid colon without defin
itive evidence of acute diverticulitis, though minimal colonic distention in this region limits evalu
ation for wall thickening. Mild rectal stool.



No distal abdominal aortic or iliac artery aneurysm. No lymphadenopathy.



Partially visualized lower lumbar laminectomies and posterior fusion with pedicle screws and stabiliz
ation rods. Unchanged appearance of the S1 left pedicle screw, likely remotely fractured with subsequ
ent removal of the screw head. Intact bilateral total hip arthroplasties without definitive evidence 
of hardware complication on limited evaluation.





IMPRESSION: 



No evidence of acute pelvic abnormality on limited evaluation as described.



Essentially unchanged postoperative findings related to left inguinal hernia repair. No recurrent ing
uinal hernia.



Partially visualized colonic diverticulosis without definitive evidence of acute diverticulitis.



Electronically signed by: Tk Guo DO (1/26/2021 5:36 PM) [id number]

## 2021-05-09 ENCOUNTER — HOSPITAL ENCOUNTER (EMERGENCY)
Dept: HOSPITAL 61 - ER | Age: 66
Discharge: HOME | End: 2021-05-09
Payer: COMMERCIAL

## 2021-05-09 VITALS — DIASTOLIC BLOOD PRESSURE: 72 MMHG | SYSTOLIC BLOOD PRESSURE: 165 MMHG

## 2021-05-09 VITALS — BODY MASS INDEX: 27.2 KG/M2 | WEIGHT: 173.28 LBS | HEIGHT: 67 IN

## 2021-05-09 DIAGNOSIS — U07.1: Primary | ICD-10-CM

## 2021-05-09 DIAGNOSIS — F17.200: ICD-10-CM

## 2021-05-09 DIAGNOSIS — G89.29: ICD-10-CM

## 2021-05-09 LAB
ALBUMIN SERPL-MCNC: 3.1 G/DL (ref 3.4–5)
ALBUMIN/GLOB SERPL: 1 {RATIO} (ref 1–1.7)
ALP SERPL-CCNC: 62 U/L (ref 46–116)
ALT SERPL-CCNC: 15 U/L (ref 16–63)
ANION GAP SERPL CALC-SCNC: 6 MMOL/L (ref 6–14)
AST SERPL-CCNC: 12 U/L (ref 15–37)
BASOPHILS # BLD AUTO: 0 X10^3/UL (ref 0–0.2)
BASOPHILS NFR BLD: 1 % (ref 0–3)
BILIRUB SERPL-MCNC: 0.1 MG/DL (ref 0.2–1)
BUN SERPL-MCNC: 10 MG/DL (ref 8–26)
BUN/CREAT SERPL: 10 (ref 6–20)
CALCIUM SERPL-MCNC: 8.4 MG/DL (ref 8.5–10.1)
CHLORIDE SERPL-SCNC: 110 MMOL/L (ref 98–107)
CO2 SERPL-SCNC: 29 MMOL/L (ref 21–32)
CREAT SERPL-MCNC: 1 MG/DL (ref 0.7–1.3)
EOSINOPHIL NFR BLD: 0.2 X10^3/UL (ref 0–0.7)
EOSINOPHIL NFR BLD: 3 % (ref 0–3)
ERYTHROCYTE [DISTWIDTH] IN BLOOD BY AUTOMATED COUNT: 13.6 % (ref 11.5–14.5)
GFR SERPLBLD BASED ON 1.73 SQ M-ARVRAT: 91.3 ML/MIN
GLUCOSE SERPL-MCNC: 98 MG/DL (ref 70–99)
HCT VFR BLD CALC: 39.3 % (ref 39–53)
HGB BLD-MCNC: 13.2 G/DL (ref 13–17.5)
LIPASE: 138 U/L (ref 73–393)
LYMPHOCYTES # BLD: 1.2 X10^3/UL (ref 1–4.8)
LYMPHOCYTES NFR BLD AUTO: 21 % (ref 24–48)
MAGNESIUM SERPL-MCNC: 2.3 MG/DL (ref 1.8–2.4)
MCH RBC QN AUTO: 31 PG (ref 25–35)
MCHC RBC AUTO-ENTMCNC: 34 G/DL (ref 31–37)
MCV RBC AUTO: 93 FL (ref 79–100)
MONO #: 0.4 X10^3/UL (ref 0–1.1)
MONOCYTES NFR BLD: 8 % (ref 0–9)
NEUT #: 3.8 X10^3/UL (ref 1.8–7.7)
NEUTROPHILS NFR BLD AUTO: 67 % (ref 31–73)
PLATELET # BLD AUTO: 231 X10^3/UL (ref 140–400)
POTASSIUM SERPL-SCNC: 3.9 MMOL/L (ref 3.5–5.1)
PROT SERPL-MCNC: 6.1 G/DL (ref 6.4–8.2)
RBC # BLD AUTO: 4.24 X10^6/UL (ref 4.3–5.7)
SODIUM SERPL-SCNC: 145 MMOL/L (ref 136–145)
WBC # BLD AUTO: 5.6 X10^3/UL (ref 4–11)

## 2021-05-09 PROCEDURE — 80053 COMPREHEN METABOLIC PANEL: CPT

## 2021-05-09 PROCEDURE — 84484 ASSAY OF TROPONIN QUANT: CPT

## 2021-05-09 PROCEDURE — 99285 EMERGENCY DEPT VISIT HI MDM: CPT

## 2021-05-09 PROCEDURE — 36415 COLL VENOUS BLD VENIPUNCTURE: CPT

## 2021-05-09 PROCEDURE — 83880 ASSAY OF NATRIURETIC PEPTIDE: CPT

## 2021-05-09 PROCEDURE — 85025 COMPLETE CBC W/AUTO DIFF WBC: CPT

## 2021-05-09 PROCEDURE — 71045 X-RAY EXAM CHEST 1 VIEW: CPT

## 2021-05-09 PROCEDURE — 83690 ASSAY OF LIPASE: CPT

## 2021-05-09 PROCEDURE — 83735 ASSAY OF MAGNESIUM: CPT

## 2021-05-09 PROCEDURE — 96360 HYDRATION IV INFUSION INIT: CPT

## 2021-05-09 PROCEDURE — 93005 ELECTROCARDIOGRAM TRACING: CPT

## 2021-05-09 NOTE — PHYS DOC
Past Medical History


Past Medical History:  Other


Additional Past Medical Histor:  CHRONIC PAIN, RLS


Past Surgical History:  Hip Replacement


Additional Past Surgical Histo:  BACK, HERNIA


Smoking Status:  Current Some Day Smoker


Additional Information:  


cigar smoker


Alcohol Use:  None


Drug Use:  None





General Adult


EDM:


Chief Complaint:  FLU SYMPTOM





HPI:


HPI:





Patient is a 65  year old male who presented to ER due to cough, nonproductive, 

body ache, chills for a week.  Patient was diagnosed, tested positive for COVID-

19 yesterday.  Patient came in today because he has been pain all over, not 

feeling well at all.  He feels weak, had no energy, pain in his chest when he 

coughs.  Patient denies any abdominal pain.





Review of Systems:


Review of Systems:


Constitutional:   Positive for fever or chills. []


Eyes:   Denies change in visual acuity. []


HENT:   Denies nasal congestion or sore throat. [] 


Respiratory:   Positive for cough or shortness of breath. [] 


Cardiovascular:   Positive for chest pain, no edema. [] 


GI:   Denies abdominal pain, nausea, vomiting, bloody stools or diarrhea. [] 


:  Denies dysuria. [] 


Musculoskeletal:   Positive for back pain or joint pain. [] 


Integument:   Denies rash. [] 


Neurologic:   Denies headache, focal weakness or sensory changes. [] 


Endocrine:   Denies polyuria or polydipsia. [] 


Lymphatic:  Denies swollen glands. [] 


Psychiatric:  Denies depression or anxiety. []





Heart Score:


C/O Chest Pain:  N/A


HEART Score for Chest Pain:  








HEART Score for Chest Pain Response (Comments) Value


 


History Slighlty/Non-Suspicious 0


 


ECG Normal 0


 


Age > 65 2


 


Risk Factors                            1 or 2 Risk Factors 1


 


Troponin < Normal Limit 0


 


Total  3








Risk Factors:


Risk Factors:  DM, Current or recent (<one month) smoker, HTN, HLP, family 

history of CAD, obesity.


Risk Scores:


Score 0 - 3:  2.5% MACE over next 6 weeks - Discharge Home


Score 4 - 6:  20.3% MACE over next 6 weeks - Admit for Clinical Observation


Score 7 - 10:  72.7% MACE over next 6 weeks - Early Invasive Strategies





Current Medications:





Current Medications








 Medications


  (Trade)  Dose


 Ordered  Sig/Andrew  Start Time


 Stop Time Status Last Admin


Dose Admin


 


 Acetaminophen


  (Tylenol)  1,000 mg  1X  ONCE  21 09:15


 21 09:16   





 


 Ibuprofen


  (Motrin)  800 mg  1X  ONCE  21 09:15


 21 09:16   














Allergies:


Allergies:





Allergies








Coded Allergies Type Severity Reaction Last Updated Verified


 


  No Known Drug Allergies    21 No











Physical Exam:


PE:





Constitutional: Well developed, well nourished, no acute distress, non-toxic 

appearance. []


HENT: Normocephalic, atraumatic, bilateral external ears normal, oropharynx 

moist, no oral exudates, nose normal. []


Eyes: PERRLA, EOMI, conjunctiva normal, no discharge. [] 


Neck: Normal range of motion, no tenderness, supple, no stridor. [] 


Cardiovascular:Heart rate regular rhythm, no murmur []


Lungs & Thorax:  Bilateral breath sounds clear to auscultation []


Abdomen: Bowel sounds normal, soft, no tenderness, no masses, no pulsatile alyssa

s. [] 


Skin: Warm, dry, no erythema, no rash. [] 


Back: No tenderness, no CVA tenderness. [] 


Extremities: No tenderness, no cyanosis, no clubbing, ROM intact, no edema. [] 


Neurologic: Alert and oriented X 3, normal motor function, normal sensory 

function, no focal deficits noted. []


Psychologic: Affect normal, judgement normal, mood normal. []





Current Patient Data:


Labs:





Laboratory Tests








Test


 21


09:20


 


White Blood Count 5.6 x10^3/uL 


 


Red Blood Count 4.24 x10^6/uL 


 


Hemoglobin 13.2 g/dL 


 


Hematocrit 39.3 % 


 


Mean Corpuscular Volume 93 fL 


 


Mean Corpuscular Hemoglobin 31 pg 


 


Mean Corpuscular Hemoglobin


Concent 34 g/dL 





 


Red Cell Distribution Width 13.6 % 


 


Platelet Count 231 x10^3/uL 


 


Neutrophils (%) (Auto) 67 % 


 


Lymphocytes (%) (Auto) 21 % 


 


Monocytes (%) (Auto) 8 % 


 


Eosinophils (%) (Auto) 3 % 


 


Basophils (%) (Auto) 1 % 


 


Neutrophils # (Auto) 3.8 x10^3/uL 


 


Lymphocytes # (Auto) 1.2 x10^3/uL 


 


Monocytes # (Auto) 0.4 x10^3/uL 


 


Eosinophils # (Auto) 0.2 x10^3/uL 


 


Basophils # (Auto) 0.0 x10^3/uL 


 


Sodium Level 145 mmol/L 


 


Potassium Level 3.9 mmol/L 


 


Chloride Level 110 mmol/L 


 


Carbon Dioxide Level 29 mmol/L 


 


Anion Gap 6 


 


Blood Urea Nitrogen 10 mg/dL 


 


Creatinine 1.0 mg/dL 


 


Estimated GFR


(Cockcroft-Gault) 91.3 





 


BUN/Creatinine Ratio 10 


 


Glucose Level 98 mg/dL 


 


Calcium Level 8.4 mg/dL 


 


Magnesium Level 2.3 mg/dL 


 


Total Bilirubin 0.1 mg/dL 


 


Aspartate Amino Transf


(AST/SGOT) 12 U/L 





 


Alanine Aminotransferase


(ALT/SGPT) 15 U/L 





 


Alkaline Phosphatase 62 U/L 


 


Troponin I Quantitative < 0.017 ng/mL 


 


NT-Pro-B-Type Natriuretic


Peptide 32 pg/mL 





 


Total Protein 6.1 g/dL 


 


Albumin 3.1 g/dL 


 


Albumin/Globulin Ratio 1.0 


 


Lipase 138 U/L 








Current Medications








 Medications


  (Trade)  Dose


 Ordered  Sig/Andrew


 Route


 PRN Reason  Start Time


 Stop Time Status Last Admin


Dose Admin


 


 Ibuprofen


  (Motrin)  800 mg  1X  ONCE


 PO


   21 09:15


 21 09:16 DC 21 09:24





 


 Acetaminophen


  (Tylenol)  1,000 mg  1X  ONCE


 PO


   21 09:15


 21 09:16 DC 21 09:25





 


 Sodium Chloride  1,000 ml @ 


 1,000 mls/hr  1X  ONCE


 IV


   21 09:00


 21 09:59 DC 21 09:22











Vital Signs:





                                   Vital Signs








  Date Time  Temp Pulse Resp B/P (MAP) Pulse Ox O2 Delivery O2 Flow Rate FiO2


 


21 08:08 97.8 56 36 143/68 (93) 97 Room Air  





 97.8       











EKG:


EKG:


EKG was done at 749, heart rate 56 bpm, sinus rhythm, no ST segment elevation.  

No ectopy.





Radiology/Procedures:


Radiology/Procedures:


[][facility]


                    [address] [place] [address] [phone]


                                        


                                 IMAGING REPORT





                                     Signed





PATIENT: OSMIN BARRAZA ACCOUNT: [id number]     MRN#: [id number]


: 1955           LOCATION: ER              AGE: 65


SEX: M                    EXAM DT: 21         ACCESSION#: [id number]


STATUS: REG ER            ORD. PHYSICIAN: SVETLANA ZAMARRIPA DO


REASON: CHEST PAIN, SOA, COVID-19 INFECTION


PROCEDURE: CHEST AP ONLY





Chest AP portable at 0823:





Reason for examination: Chest pain and shortness of breath. Covid 19 infection.





Comparison is made to previous studies dated 10/9/2020 and 3/20/2019.





The heart size is normal. Mediastinum is unremarkable. Lung fields again show 

some linear density at the left lung base which is unchanged and may represent 

some parenchymal scarring. There is also a calcified granuloma at the left lung 

base. No acute bony abnormalities are seen.





Impression:





Linear densities at the left lung base which are unchanged and may represent 

some parenchymal scarring.





Electronically signed by: Quincy Louis MD (2021 8:37 AM) [facility]














DICTATED and SIGNED BY:     QUINCY LOUIS MD


DATE:     21 [id number] 0





Course & Med Decision Making:


Course & Med Decision Making


Pertinent Labs and Imaging studies reviewed. (See chart for details)





Patient is a 65-year-old male who presented to ER for evaluation of body ache, 

fever and chill, joint pain, cough.  Patient was tested positive for COVID-19 

yesterday.  Patient chest x-ray did not show any infiltration.  Oxygen 

saturation is 98% on room air.  Patient was in no acute distress.  Patient does 

not need to be admitted to hospital.  He will be discharged home.





Dragon Disclaimer:


Dragon Disclaimer:


This electronic medical record was generated, in whole or in part, using a voice

 recognition dictation system.





Departure


Departure


Impression:  


   Primary Impression:  


   COVID-19 virus infection


Disposition:  01 HOME / SELF CARE / HOMELESS


Condition:  STABLE


Referrals:  


SHAINA HAYES MD (PCP)


Follow-up with your doctor as needed.


Patient Instructions:  Viral Syndrome





Additional Instructions:  


You have been tested for or diagnosed with COVID-19. It is an infection caused 

by a new type 


of coronavirus. COVID-19 will cause cold-like or mild flu symptoms in most. It 

can cause 


more severe symptoms like problems breathing in some.





There is no treatment for COVID-19. The body will clear the infection over time.

 Self-care 


will help to ease discomfort.





Steps to Take:


Self-Care


Rest as needed. Healthy habits may help you feel better. Steps include:





Choose healthy foods including fruits and vegetables. Drink water throughout the

 day.


Get plenty of sleep each night.


If you smoke, try to quit. It may ease breathing.


Avoid alcohol.


Keep Others Healthy


The virus can spread to others. Droplets are released every time you sneeze or 

cough. The 


droplets can get into the mouth, nose, or eyes of people near you and lead to 

infection. To 


lower the chances of spreading COVID-19 to others:





Stay at home until your doctor has said it is safe to leave. If you tested 

positive this 


will mean staying isolated until both of the following are true:





At least 7 days have passed since the start of illness.


You are free of fever for at least 72 hours without the use of medicine.


During this time:





 - Avoid public areas, events, or transportation. Do not return to work or 

school until your 


doctor has said it is safe to do so.


 - Call ahead if you need to go to a medical center. Let them know you may have 

COVID-19. It 


will help them guide you where to go. They may also ask you to wear a facemask 

when you come 


to the office.


 - If you call for emergency medical services, let them know you may have COVID-

19.


While at home:





 - Try to avoid close contact with others. Stay about 6 feet away.


 - If possible, spend most of your time in a separate room from others.


 - Use a face mask if you will be in close contact with others such as sharing a

 room or 


vehicle.


 - Have someone wipe down common surfaces in the home. Use household  

every day on 


areas like doorknobs, counters, or sinks.


 - Cough or sneeze into a tissue. Throw the tissue away right after use. If a 

tissue is not 


available, cough or sneeze into your elbow.


 - Wash your hands often. Wash them after sneezing or coughing. Use soap and 

water and wash 


for at least 20 seconds. Alcohol based hand  can be used if soap and wate

r is not 


available.


 - Do not prepare food for others. Avoid sharing personal items like forks, 

spoons, or 


toothbrushes.


 - Avoid close contact with pets while you are sick. There is no evidence of the

 virus 


passing to pets. This is a safety step until more is known about this virus.


Isolation can be frustrating. Social interaction can help. Keep in touch with 

friends and 


family through phone and tech options. You can still interact with others in 

your home, just 


keep a safe distance of about 6 feet.





Follow-up:


Your doctors office will check in with you to see if there are any changes in 

your health. 


You may be asked to keep track of symptoms to share with them. They will also 

let you know 


when you are clear to be in public again.





Problems to Look Out For:


Contact your doctor if your recovery is not going as you expect. Get emergency 

care if you 


have problems such as:





 - Trouble breathing


 - Nonstop chest pain or pressure


 - Changes in awareness, confusion, or problems waking


 - Lips or face have bluish color


 - Worsening of symptoms


If you think you have an emergency, call for emergency medical services right 

away.





As taken from [facility] SVETLANA Dumas DO                 May 9, 2021 08:56

## 2021-05-09 NOTE — EKG
[facility]

              [address] [place] [address]

Test Date:    2021               Test Time:    07:49:44

Pat Name:     OSMIN BARRAZA           Department:   

Patient ID:   [id number]           Room:          

Gender:       M                        Technician:   

:          1955               Requested By: SVETLANA ZAMARRIPA

Order Number: [id number]           Reading MD:     

                                 Measurements

Intervals                              Axis          

Rate:         56                       P:            38

MN:           190                      QRS:          71

QRSD:         98                       T:            43

QT:           406                                    

QTc:          394                                    

                           Interpretive Statements

SINUS RHYTHM

NO SPECIFIC ECG ABNORMALITIES

RI6.01

No previous ECG available for comparison

## 2021-05-09 NOTE — RAD
Chest AP portable at 0823:



Reason for examination: Chest pain and shortness of breath. Covid 19 infection.



Comparison is made to previous studies dated 10/9/2020 and 3/20/2019.



The heart size is normal. Mediastinum is unremarkable. Lung fields again show some linear density at 
the left lung base which is unchanged and may represent some parenchymal scarring. There is also a ca
lcified granuloma at the left lung base. No acute bony abnormalities are seen.



Impression:



Linear densities at the left lung base which are unchanged and may represent some parenchymal scaralejo murray



Electronically signed by: Gabriela Mendez MD (5/9/2021 8:37 AM) [facility]

## 2021-10-18 ENCOUNTER — HOSPITAL ENCOUNTER (INPATIENT)
Dept: HOSPITAL 61 - ER | Age: 66
LOS: 1 days | Discharge: HOME | DRG: 390 | End: 2021-10-19
Attending: INTERNAL MEDICINE | Admitting: INTERNAL MEDICINE
Payer: COMMERCIAL

## 2021-10-18 VITALS — SYSTOLIC BLOOD PRESSURE: 165 MMHG | DIASTOLIC BLOOD PRESSURE: 73 MMHG

## 2021-10-18 VITALS — WEIGHT: 171.96 LBS | BODY MASS INDEX: 26.99 KG/M2 | HEIGHT: 67 IN

## 2021-10-18 VITALS — DIASTOLIC BLOOD PRESSURE: 73 MMHG | SYSTOLIC BLOOD PRESSURE: 190 MMHG

## 2021-10-18 VITALS — SYSTOLIC BLOOD PRESSURE: 146 MMHG | DIASTOLIC BLOOD PRESSURE: 74 MMHG

## 2021-10-18 DIAGNOSIS — Z79.891: ICD-10-CM

## 2021-10-18 DIAGNOSIS — F19.10: ICD-10-CM

## 2021-10-18 DIAGNOSIS — G89.29: ICD-10-CM

## 2021-10-18 DIAGNOSIS — Z96.643: ICD-10-CM

## 2021-10-18 DIAGNOSIS — D72.828: ICD-10-CM

## 2021-10-18 DIAGNOSIS — I10: ICD-10-CM

## 2021-10-18 DIAGNOSIS — Z20.822: ICD-10-CM

## 2021-10-18 DIAGNOSIS — Z87.891: ICD-10-CM

## 2021-10-18 DIAGNOSIS — K56.7: Primary | ICD-10-CM

## 2021-10-18 LAB
% ATYL: 1 % (ref 0–0)
% BANDS: 11 % (ref 0–9)
% LYMPHS: 3 % (ref 24–48)
% MONOS: 8 % (ref 0–10)
% SEGS: 76 % (ref 35–66)
ALBUMIN SERPL-MCNC: 3.8 G/DL (ref 3.4–5)
ALBUMIN/GLOB SERPL: 1 {RATIO} (ref 1–1.7)
ALP SERPL-CCNC: 70 U/L (ref 46–116)
ALT SERPL-CCNC: 18 U/L (ref 16–63)
AMPHETAMINE/METHAMPHETAMINE: (no result)
ANION GAP SERPL CALC-SCNC: 8 MMOL/L (ref 6–14)
APTT PPP: YELLOW S
AST SERPL-CCNC: 17 U/L (ref 15–37)
BACTERIA #/AREA URNS HPF: 0 /HPF
BARBITURATES UR-MCNC: (no result) UG/ML
BASOPHILS # BLD AUTO: 0 X10^3/UL (ref 0–0.2)
BASOPHILS NFR BLD: 0 % (ref 0–3)
BENZODIAZ UR-MCNC: (no result) UG/L
BILIRUB SERPL-MCNC: 0.4 MG/DL (ref 0.2–1)
BILIRUB UR QL STRIP: NEGATIVE
BUN SERPL-MCNC: 8 MG/DL (ref 8–26)
BUN/CREAT SERPL: 7 (ref 6–20)
CALCIUM SERPL-MCNC: 8.9 MG/DL (ref 8.5–10.1)
CANNABINOIDS UR-MCNC: (no result) UG/L
CHLORIDE SERPL-SCNC: 106 MMOL/L (ref 98–107)
CO2 SERPL-SCNC: 29 MMOL/L (ref 21–32)
COCAINE UR-MCNC: (no result) NG/ML
CREAT SERPL-MCNC: 1.2 MG/DL (ref 0.7–1.3)
EOSINOPHIL NFR BLD AUTO: 1 % (ref 0–5)
EOSINOPHIL NFR BLD: 0.1 X10^3/UL (ref 0–0.7)
EOSINOPHIL NFR BLD: 1 % (ref 0–3)
ERYTHROCYTE [DISTWIDTH] IN BLOOD BY AUTOMATED COUNT: 14 % (ref 11.5–14.5)
FIBRINOGEN PPP-MCNC: CLEAR MG/DL
GFR SERPLBLD BASED ON 1.73 SQ M-ARVRAT: 61 ML/MIN
GLUCOSE SERPL-MCNC: 116 MG/DL (ref 70–99)
HCT VFR BLD CALC: 45.9 % (ref 39–53)
HGB BLD-MCNC: 15.7 G/DL (ref 13–17.5)
LYMPHOCYTES # BLD: 0.4 X10^3/UL (ref 1–4.8)
LYMPHOCYTES NFR BLD AUTO: 2 % (ref 24–48)
MAGNESIUM SERPL-MCNC: 2.1 MG/DL (ref 1.8–2.4)
MCH RBC QN AUTO: 32 PG (ref 25–35)
MCHC RBC AUTO-ENTMCNC: 34 G/DL (ref 31–37)
MCV RBC AUTO: 94 FL (ref 79–100)
METHADONE SERPL-MCNC: (no result) NG/ML
MONO #: 0.8 X10^3/UL (ref 0–1.1)
MONOCYTES NFR BLD: 4 % (ref 0–9)
NEUT #: 17.4 X10^3/UL (ref 1.8–7.7)
NEUTROPHILS NFR BLD AUTO: 93 % (ref 31–73)
NITRITE UR QL STRIP: NEGATIVE
OPIATES UR-MCNC: (no result) NG/ML
PCP SERPL-MCNC: (no result) MG/DL
PH UR STRIP: 8 [PH]
PLATELET # BLD AUTO: 200 X10^3/UL (ref 140–400)
PLATELET # BLD EST: ADEQUATE 10*3/UL
POTASSIUM SERPL-SCNC: 3.9 MMOL/L (ref 3.5–5.1)
PROT SERPL-MCNC: 7.5 G/DL (ref 6.4–8.2)
PROT UR STRIP-MCNC: NEGATIVE MG/DL
RBC # BLD AUTO: 4.91 X10^6/UL (ref 4.3–5.7)
RBC #/AREA URNS HPF: 0 /HPF (ref 0–2)
SODIUM SERPL-SCNC: 143 MMOL/L (ref 136–145)
UROBILINOGEN UR-MCNC: 1 MG/DL
WBC # BLD AUTO: 18.7 X10^3/UL (ref 4–11)
WBC #/AREA URNS HPF: 0 /HPF (ref 0–4)

## 2021-10-18 PROCEDURE — 85007 BL SMEAR W/DIFF WBC COUNT: CPT

## 2021-10-18 PROCEDURE — 80307 DRUG TEST PRSMV CHEM ANLYZR: CPT

## 2021-10-18 PROCEDURE — 84484 ASSAY OF TROPONIN QUANT: CPT

## 2021-10-18 PROCEDURE — 93005 ELECTROCARDIOGRAM TRACING: CPT

## 2021-10-18 PROCEDURE — 96374 THER/PROPH/DIAG INJ IV PUSH: CPT

## 2021-10-18 PROCEDURE — 83880 ASSAY OF NATRIURETIC PEPTIDE: CPT

## 2021-10-18 PROCEDURE — G0378 HOSPITAL OBSERVATION PER HR: HCPCS

## 2021-10-18 PROCEDURE — 96361 HYDRATE IV INFUSION ADD-ON: CPT

## 2021-10-18 PROCEDURE — U0003 INFECTIOUS AGENT DETECTION BY NUCLEIC ACID (DNA OR RNA); SEVERE ACUTE RESPIRATORY SYNDROME CORONAVIRUS 2 (SARS-COV-2) (CORONAVIRUS DISEASE [COVID-19]), AMPLIFIED PROBE TECHNIQUE, MAKING USE OF HIGH THROUGHPUT TECHNOLOGIES AS DESCRIBED BY CMS-2020-01-R: HCPCS

## 2021-10-18 PROCEDURE — 81001 URINALYSIS AUTO W/SCOPE: CPT

## 2021-10-18 PROCEDURE — 74177 CT ABD & PELVIS W/CONTRAST: CPT

## 2021-10-18 PROCEDURE — 90686 IIV4 VACC NO PRSV 0.5 ML IM: CPT

## 2021-10-18 PROCEDURE — 87426 SARSCOV CORONAVIRUS AG IA: CPT

## 2021-10-18 PROCEDURE — C9113 INJ PANTOPRAZOLE SODIUM, VIA: HCPCS

## 2021-10-18 PROCEDURE — 85025 COMPLETE CBC W/AUTO DIFF WBC: CPT

## 2021-10-18 PROCEDURE — 96375 TX/PRO/DX INJ NEW DRUG ADDON: CPT

## 2021-10-18 PROCEDURE — 84100 ASSAY OF PHOSPHORUS: CPT

## 2021-10-18 PROCEDURE — 80048 BASIC METABOLIC PNL TOTAL CA: CPT

## 2021-10-18 PROCEDURE — 90471 IMMUNIZATION ADMIN: CPT

## 2021-10-18 PROCEDURE — 80053 COMPREHEN METABOLIC PANEL: CPT

## 2021-10-18 PROCEDURE — 83735 ASSAY OF MAGNESIUM: CPT

## 2021-10-18 PROCEDURE — 36415 COLL VENOUS BLD VENIPUNCTURE: CPT

## 2021-10-18 RX ADMIN — DICYCLOMINE HYDROCHLORIDE PRN MG: 10 INJECTION, SOLUTION INTRAMUSCULAR at 19:55

## 2021-10-18 RX ADMIN — PANTOPRAZOLE SODIUM SCH MG: 40 INJECTION, POWDER, FOR SOLUTION INTRAVENOUS at 21:48

## 2021-10-18 RX ADMIN — BACITRACIN SCH MLS/HR: 5000 INJECTION, POWDER, FOR SOLUTION INTRAMUSCULAR at 19:55

## 2021-10-18 NOTE — PDOC1
History and Physical


Date of Service:


DOS:


DATE: 10/18/21 


TIME: 19:45





Chief Complaint:


Chief Complain:


N/V





History of Present Illness:


HPI:


Hx obtained from discussion with the ED physician and chart review:





Patient is a 66-year-old male with past medical history of hypertension, 

bilateral hip replacement and chronic opioid use for his chronic pain who comes 

in to the ED with nausea vomiting that started this morning.  Patient had 

multiple episodes of vomiting and abdominal cramping.  Abdominal pain is in the 

epigastric and lower abdominal area that occurs with nausea vomiting.  Has never

had episodes like this before.  Patient currently denies any fevers, 

hematemesis, melena, chest pain, diarrhea, shortness of breath, or any 

alleviating or exacerbating factors.  Patient is vaccinated for Covid.





Past Medical/Surgical History:


PMH/PSH:


PMHx: HTN


SurgHx:  Bilateral Hip Replacement





Allergies:


Allergies:  


Coded Allergies:  


     No Known Drug Allergies (Unverified , 10/18/21)





Family History:


Family History:


Reviewed with no relevant findings





Social History:


Social History:


Smoking Status:  Former Smoker, daily Marijuana 1 joint per day


Alcohol Use:  Rarely





Current Medications:


Current Medications





Current Medications


Fentanyl Citrate (Fentanyl 2ml Vial) 50 mcg PRN Q15MIN  PRN IV PAIN GREATER THAN

3/10 Last administered on 10/18/21at 16:09;  Start 10/18/21 at 15:30;  Stop 

10/19/21 at 15:29


Ondansetron HCl (Zofran) 4 mg 1X  ONCE IVP  Last administered on 10/18/21at 

16:08;  Start 10/18/21 at 15:30;  Stop 10/18/21 at 15:31;  Status DC


Hydralazine HCl (Apresoline Inj) 10 mg 1X  ONCE IVP ;  Start 10/18/21 at 15:30; 

Stop 10/18/21 at 15:31;  Status DC


Sodium Chloride 1,000 ml @  1,000 mls/hr 1X  ONCE IV  Last administered on 

10/18/21at 16:08;  Start 10/18/21 at 15:30;  Stop 10/18/21 at 16:29;  Status DC


Iohexol (Omnipaque 300 Mg/ml) 75 ml 1X  ONCE IV  Last administered on 10/18/21at

16:28;  Start 10/18/21 at 16:00;  Stop 10/18/21 at 16:01;  Status DC


Info (CONTRAST GIVEN -- Rx MONITORING) 1 each PRN DAILY  PRN MC SEE COMMENTS;  

Start 10/18/21 at 16:00;  Stop 10/20/21 at 15:59


Sennosides (Senna) 17.2 mg PRN BID  PRN PO CONSTIPATION;  Start 10/18/21 at 

18:15


Docusate Sodium (Colace) 100 mg PRN DAILY  PRN PO HARD STOOLS;  Start 10/18/21 

at 18:15


Ondansetron HCl (Zofran) 4 mg PRN Q6HRS  PRN IVP NAUSEA/VOMITING;  Start 

10/18/21 at 18:15


Dextrose (Dextrose 50%-Water Syringe) 12.5 gm PRN Q15MIN  PRN IV SEE COMMENTS;  

Start 10/18/21 at 18:15


Sodium Chloride 1,000 ml @  100 mls/hr Q10H IV ;  Start 10/18/21 at 18:15


Acetaminophen (Tylenol) 650 mg PRN Q4HRS  PRN PO TEMP OVER 100.4F OR MILD PAIN; 

Start 10/18/21 at 18:15


Lorazepam (Ativan) 0.5 mg PRN Q6HRS  PRN PO ANXIETY / AGITATION;  Start 10/18/21

at 18:15


Lorazepam (Ativan Inj) 0.25 mg PRN Q4HRS  PRN IV ANXIETY / AGITATION;  Start 

10/18/21 at 18:15


Enoxaparin Sodium (Lovenox 40mg Syringe) 40 mg Q24H SQ ;  Start 10/18/21 at 

18:15


Prochlorperazine Edisylate (Compazine) 10 mg PRN Q6HRS  PRN IV NAUSEA/VOMITING; 

Start 10/18/21 at 18:15


Zolpidem Tartrate (Ambien) 2.5 mg PRN QHS  PRN PO INSOMNIA;  Start 10/18/21 at 

18:15


Dicyclomine HCl (Bentyl) 10 mg TID PRN  PRN IM ABDOMINAL CRAMPS;  Start 10/18/21

at 18:15





ROS:


Review of Systems


Review of System


REVIEW OF SYSTEMS:


GENERAL:  Denies weakness


SKIN:  No bruising, hair changes or rashes.


EYES:  No blurred, double or loss of vision.


NOSE AND THROAT:  No history of nosebleeds, hoarseness or sore throat.


HEART:  No history of palpitations, chest pain or shortness of breath on


exertion.


LUNGS:  Denies cough, hemoptysis, wheezing or shortness of breath.


GASTROINTESTINAL:  Denies changes in appetite, nausea, vomiting, diarrhea or


constipation.


GENITOURINARY:  No history of frequency, urgency, hesitancy or nocturia.


NEUROLOGIC:  Denies history of numbness, tingling, or tremor.


PSYCHIATRIC:  No history of panic, anxiety or depression.


ENDOCRINE:  No history of heat or cold intolerance, polyuria or polydipsia.


EXTREMITIES:  Denies joint pain, pain on walking or stiffness.





Physical Exam:


Vital Signs:





Vital Signs








  Date Time  Temp Pulse Resp B/P (MAP) Pulse Ox O2 Delivery O2 Flow Rate FiO2


 


10/18/21 18:34  58 19 185/77 (113) 98 Room Air  


 


10/18/21 14:54 97.8       





 97.8       








Physcial Exam:


GEN:   No apparent distress.  Alert and oriented


HEENT:   Normal cephalic, atraumatic, external auditory canals are patent


EYES:   Extraocular muscles are intact, pupil are equally round and reactive to 

light and accommodation


MUSCULOSKELETAL:  Well developed , well nourished, good range of motion


ENDOCRINE:   No thyromegaly was palpated


LYMPHATICS:   No cervical chain or axillary nodes were noted


HEMATOPOIETIC:  No bruising


NECK:   Supple, no JVD, no thyromegaly was noted


LUNGS:   Clear to auscultation in all lung fields without rhonchi or wheezing


HEART:    RRR, S!, S2 present.  Peripheral pulses intact, no obvious murmurs 

noted


ABDOMEN:   Soft, nontender.  Positive bowel sounds, no organomegaly, normal 

bowel sounds


EXTREMITIES:   Without clubbing, cyanosis, or edema.  Pedal pulses intact.  

Negative Homans sign


NEUROLOGIC:   Normal speech and tone.  A&O x 3, moves all extremities, no 

obvious focal deficits


PSYCHIATRIC:   Normal affect, normal mood. Stable


SKIN:   No ulcerations or rashes, good skin turgor, no jaundice


VASCULAR:   Good capillary refill, neurovascular bundle appears to be intact





Labs:


Labs:





Laboratory Tests








Test


 10/18/21


15:14 10/18/21


17:14 10/18/21


18:35


 


White Blood Count


 18.7 x10^3/uL


(4.0-11.0) 


 





 


Red Blood Count


 4.91 x10^6/uL


(4.30-5.70) 


 





 


Hemoglobin


 15.7 g/dL


(13.0-17.5) 


 





 


Hematocrit


 45.9 %


(39.0-53.0) 


 





 


Mean Corpuscular Volume 94 fL ()   


 


Mean Corpuscular Hemoglobin 32 pg (25-35)   


 


Mean Corpuscular Hemoglobin


Concent 34 g/dL


(31-37) 


 





 


Red Cell Distribution Width


 14.0 %


(11.5-14.5) 


 





 


Platelet Count


 200 x10^3/uL


(140-400) 


 





 


Neutrophils (%) (Auto) 93 % (31-73)   


 


Lymphocytes (%) (Auto) 2 % (24-48)   


 


Monocytes (%) (Auto) 4 % (0-9)   


 


Eosinophils (%) (Auto) 1 % (0-3)   


 


Basophils (%) (Auto) 0 % (0-3)   


 


Neutrophils # (Auto)


 17.4 x10^3/uL


(1.8-7.7) 


 





 


Lymphocytes # (Auto)


 0.4 x10^3/uL


(1.0-4.8) 


 





 


Monocytes # (Auto)


 0.8 x10^3/uL


(0.0-1.1) 


 





 


Eosinophils # (Auto)


 0.1 x10^3/uL


(0.0-0.7) 


 





 


Basophils # (Auto)


 0.0 x10^3/uL


(0.0-0.2) 


 





 


Segmented Neutrophils % 76 % (35-66)   


 


Band Neutrophils % 11 % (0-9)   


 


Lymphocytes % 3 % (24-48)   


 


Atypical Lymphocytes %


(Manual) 1 % (0-0) 


 


 





 


Monocytes % 8 % (0-10)   


 


Eosinophils % 1 % (0-5)   


 


Platelet Estimate


 Adequate


(ADEQUATE) 


 





 


Sodium Level


 143 mmol/L


(136-145) 


 





 


Potassium Level


 3.9 mmol/L


(3.5-5.1) 


 





 


Chloride Level


 106 mmol/L


() 


 





 


Carbon Dioxide Level


 29 mmol/L


(21-32) 


 





 


Anion Gap 8 (6-14)   


 


Blood Urea Nitrogen 8 mg/dL (8-26)   


 


Creatinine


 1.2 mg/dL


(0.7-1.3) 


 





 


Estimated GFR


(Cockcroft-Gault) 61.0 


 


 





 


BUN/Creatinine Ratio 7 (6-20)   


 


Glucose Level


 116 mg/dL


(70-99) 


 





 


Calcium Level


 8.9 mg/dL


(8.5-10.1) 


 





 


Magnesium Level


 2.1 mg/dL


(1.8-2.4) 


 





 


Total Bilirubin


 0.4 mg/dL


(0.2-1.0) 


 





 


Aspartate Amino Transf


(AST/SGOT) 17 U/L (15-37) 


 


 





 


Alanine Aminotransferase


(ALT/SGPT) 18 U/L (16-63) 


 


 





 


Alkaline Phosphatase


 70 U/L


() 


 





 


Troponin I Quantitative


 < 0.017 ng/mL


(0.000-0.055) 


 < 0.017 ng/mL


(0.000-0.055)


 


NT-Pro-B-Type Natriuretic


Peptide 23 pg/mL


(0-124) 


 





 


Total Protein


 7.5 g/dL


(6.4-8.2) 


 





 


Albumin


 3.8 g/dL


(3.4-5.0) 


 





 


Albumin/Globulin Ratio 1.0 (1.0-1.7)   


 


SARS-CoV-2 Antigen (Rapid)


 Negative


(NEGATIVE) 


 





 


Urine Collection Type  Unknown  


 


Urine Color  Yellow  


 


Urine Clarity  Clear  


 


Urine pH  8.0 (<5.0-8.0)  


 


Urine Specific Gravity


 


 >=1.030


(1.000-1.030) 





 


Urine Protein


 


 Negative mg/dL


(NEG-TRACE) 





 


Urine Glucose (UA)


 


 Negative mg/dL


(NEG) 





 


Urine Ketones (Stick)


 


 Negative mg/dL


(NEG) 





 


Urine Blood  Negative (NEG)  


 


Urine Nitrite  Negative (NEG)  


 


Urine Bilirubin  Negative (NEG)  


 


Urine Urobilinogen Dipstick


 


 1.0 mg/dL (0.2


mg/dL) 





 


Urine Leukocyte Esterase  Negative (NEG)  


 


Urine RBC  0 /HPF (0-2)  


 


Urine WBC  0 /HPF (0-4)  


 


Urine Squamous Epithelial


Cells 


 Occ /LPF 


 





 


Urine Bacteria  0 /HPF (0-FEW)  


 


Urine Opiates Screen  Neg (NEG)  


 


Urine Methadone Screen  Neg (NEG)  


 


Urine Barbiturates  Neg (NEG)  


 


Urine Phencyclidine Screen  Neg (NEG)  


 


Urine


Amphetamine/Methamphetamine 


 Neg (NEG) 


 





 


Urine Benzodiazepines Screen  Pos (NEG)  


 


Urine Cocaine Screen  Pos (NEG)  


 


Urine Cannabinoids Screen  Pos (NEG)  


 


Urine Ethyl Alcohol  Neg (NEG)  








Laboratory Tests








Test


 10/18/21


15:14 10/18/21


17:14 10/18/21


18:35


 


White Blood Count


 18.7 x10^3/uL


(4.0-11.0) 


 





 


Red Blood Count


 4.91 x10^6/uL


(4.30-5.70) 


 





 


Hemoglobin


 15.7 g/dL


(13.0-17.5) 


 





 


Hematocrit


 45.9 %


(39.0-53.0) 


 





 


Mean Corpuscular Volume 94 fL ()   


 


Mean Corpuscular Hemoglobin 32 pg (25-35)   


 


Mean Corpuscular Hemoglobin


Concent 34 g/dL


(31-37) 


 





 


Red Cell Distribution Width


 14.0 %


(11.5-14.5) 


 





 


Platelet Count


 200 x10^3/uL


(140-400) 


 





 


Neutrophils (%) (Auto) 93 % (31-73)   


 


Lymphocytes (%) (Auto) 2 % (24-48)   


 


Monocytes (%) (Auto) 4 % (0-9)   


 


Eosinophils (%) (Auto) 1 % (0-3)   


 


Basophils (%) (Auto) 0 % (0-3)   


 


Neutrophils # (Auto)


 17.4 x10^3/uL


(1.8-7.7) 


 





 


Lymphocytes # (Auto)


 0.4 x10^3/uL


(1.0-4.8) 


 





 


Monocytes # (Auto)


 0.8 x10^3/uL


(0.0-1.1) 


 





 


Eosinophils # (Auto)


 0.1 x10^3/uL


(0.0-0.7) 


 





 


Basophils # (Auto)


 0.0 x10^3/uL


(0.0-0.2) 


 





 


Segmented Neutrophils % 76 % (35-66)   


 


Band Neutrophils % 11 % (0-9)   


 


Lymphocytes % 3 % (24-48)   


 


Atypical Lymphocytes %


(Manual) 1 % (0-0) 


 


 





 


Monocytes % 8 % (0-10)   


 


Eosinophils % 1 % (0-5)   


 


Platelet Estimate


 Adequate


(ADEQUATE) 


 





 


Sodium Level


 143 mmol/L


(136-145) 


 





 


Potassium Level


 3.9 mmol/L


(3.5-5.1) 


 





 


Chloride Level


 106 mmol/L


() 


 





 


Carbon Dioxide Level


 29 mmol/L


(21-32) 


 





 


Anion Gap 8 (6-14)   


 


Blood Urea Nitrogen 8 mg/dL (8-26)   


 


Creatinine


 1.2 mg/dL


(0.7-1.3) 


 





 


Estimated GFR


(Cockcroft-Gault) 61.0 


 


 





 


BUN/Creatinine Ratio 7 (6-20)   


 


Glucose Level


 116 mg/dL


(70-99) 


 





 


Calcium Level


 8.9 mg/dL


(8.5-10.1) 


 





 


Magnesium Level


 2.1 mg/dL


(1.8-2.4) 


 





 


Total Bilirubin


 0.4 mg/dL


(0.2-1.0) 


 





 


Aspartate Amino Transf


(AST/SGOT) 17 U/L (15-37) 


 


 





 


Alanine Aminotransferase


(ALT/SGPT) 18 U/L (16-63) 


 


 





 


Alkaline Phosphatase


 70 U/L


() 


 





 


Troponin I Quantitative


 < 0.017 ng/mL


(0.000-0.055) 


 < 0.017 ng/mL


(0.000-0.055)


 


NT-Pro-B-Type Natriuretic


Peptide 23 pg/mL


(0-124) 


 





 


Total Protein


 7.5 g/dL


(6.4-8.2) 


 





 


Albumin


 3.8 g/dL


(3.4-5.0) 


 





 


Albumin/Globulin Ratio 1.0 (1.0-1.7)   


 


SARS-CoV-2 Antigen (Rapid)


 Negative


(NEGATIVE) 


 





 


Urine Collection Type  Unknown  


 


Urine Color  Yellow  


 


Urine Clarity  Clear  


 


Urine pH  8.0 (<5.0-8.0)  


 


Urine Specific Gravity


 


 >=1.030


(1.000-1.030) 





 


Urine Protein


 


 Negative mg/dL


(NEG-TRACE) 





 


Urine Glucose (UA)


 


 Negative mg/dL


(NEG) 





 


Urine Ketones (Stick)


 


 Negative mg/dL


(NEG) 





 


Urine Blood  Negative (NEG)  


 


Urine Nitrite  Negative (NEG)  


 


Urine Bilirubin  Negative (NEG)  


 


Urine Urobilinogen Dipstick


 


 1.0 mg/dL (0.2


mg/dL) 





 


Urine Leukocyte Esterase  Negative (NEG)  


 


Urine RBC  0 /HPF (0-2)  


 


Urine WBC  0 /HPF (0-4)  


 


Urine Squamous Epithelial


Cells 


 Occ /LPF 


 





 


Urine Bacteria  0 /HPF (0-FEW)  


 


Urine Opiates Screen  Neg (NEG)  


 


Urine Methadone Screen  Neg (NEG)  


 


Urine Barbiturates  Neg (NEG)  


 


Urine Phencyclidine Screen  Neg (NEG)  


 


Urine


Amphetamine/Methamphetamine 


 Neg (NEG) 


 





 


Urine Benzodiazepines Screen  Pos (NEG)  


 


Urine Cocaine Screen  Pos (NEG)  


 


Urine Cannabinoids Screen  Pos (NEG)  


 


Urine Ethyl Alcohol  Neg (NEG)  











Images:


Images


PROCEDURE: CT ABD PELV W/ IV CONTRST ONLY





Impression:





1. Left-sided inguinal canal hernia without evidence of obstruction or 

incarceration.





2. Distended fluid-filled loops of small bowel likely secondary to mild ileus.





Assessment/Plan


Assessment/Plan


Intractable nausea vomiting, possible cyclic vomiting syndrome


Mild ileus


Cannabinoid positivity


Polysubstance abuse


Reactive leukocytosis likely related to ileus








Admit to hospitalist service for further management


Continue IV fluids


Keep n.p.o.


Bentyl intramuscular every 8 as needed


Aspiration precautions


Hot shower upon arrival to the floor ASAP


SCD for DVT prophylaxis


Protonix IV GI prophylaxis


ADA diet


CODE STATUS full


Discussed with RN and SW


Disposition inpatient management as above


DPOA: [facility]





Justifications for Admission


Other Justification


intractable N/V











RANDEE HUSSEIN MD                  Oct 18, 2021 19:47

## 2021-10-18 NOTE — PHYS DOC
Past Medical History


Past Medical History:  Hypertension


Past Surgical History:  No Surgical History


Smoking Status:  Former Smoker


Alcohol Use:  Rarely





General Adult


EDM:


Chief Complaint:  NAUSEA/VOMITING/DIARRHEA





HPI:


HPI:





Patient is a 66  year old male with a history of hypertension, chronic pain, who

presents to the ED today complaining of moderate bilateral lower abdominal pain 

with nausea vomiting and diarrhea that began today.  Patient denies any fever.  

Denies any hematemesis or melena.  Describes the pain as sharp and intermittent.

 Denies anything relieving the pain.  He states he has oxycodone at home but did

not try it.  He states he is fully vaccinated against Covid





Review of Systems:


Review of Systems:


Constitutional:   Denies fever or chills. []


Eyes:   Denies change in visual acuity. []


HENT:   Denies nasal congestion or sore throat. [] 


Respiratory:   Denies cough or shortness of breath. [] 


Cardiovascular:   Denies chest pain or edema. [] 


GI: Reports abdominal pain, nausea vomiting and diarrhea


:  Denies dysuria. [] 


Musculoskeletal:   Denies back pain or joint pain. [] 


Integument:   Denies rash. [] 


Neurologic:   Denies headache, focal weakness or sensory changes. [] 


Psychiatric:  Denies depression or anxiety. []





Heart Score:


C/O Chest Pain:  N/A


Risk Factors:


Risk Factors:  DM, Current or recent (<one month) smoker, HTN, HLP, family 

history of CAD, obesity.


Risk Scores:


Score 0 - 3:  2.5% MACE over next 6 weeks - Discharge Home


Score 4 - 6:  20.3% MACE over next 6 weeks - Admit for Clinical Observation


Score 7 - 10:  72.7% MACE over next 6 weeks - Early Invasive Strategies





Current Medications:





Current Medications








 Medications


  (Trade)  Dose


 Ordered  Sig/Andrew  Start Time


 Stop Time Status Last Admin


Dose Admin


 


 Fentanyl Citrate


  (Fentanyl 2ml


 Vial)  50 mcg  PRN Q15MIN  PRN  10/18/21 15:30


 10/19/21 15:29   





 


 Hydralazine HCl


  (Apresoline Inj)  10 mg  1X  ONCE  10/18/21 15:30


 10/18/21 15:31 DC  





 


 Ondansetron HCl


  (Zofran)  4 mg  1X  ONCE  10/18/21 15:30


 10/18/21 15:31 DC  





 


 Sodium Chloride  1,000 ml @ 


 1,000 mls/hr  1X  ONCE  10/18/21 15:30


 10/18/21 16:29   














Allergies:


Allergies:





Allergies








Coded Allergies Type Severity Reaction Last Updated Verified


 


  No Known Drug Allergies    10/18/21 No











Physical Exam:


PE:





Constitutional: Well developed, well nourished, no acute distress, non-toxic 

appearance. []


HENT: Normocephalic, atraumatic, bilateral external ears normal, oropharynx 

moist, no oral exudates, nose normal. []


Eyes: PERRLA, EOMI, conjunctiva normal, no discharge. [] 


Neck: Normal range of motion, no tenderness, supple, no stridor. [] 


Cardiovascular:Heart rate regular rhythm, no murmur []


Lungs & Thorax:  Bilateral breath sounds clear to auscultation []


Abdomen: Bowel sounds normal, soft, diffuse tenderness throughout the lower 

abdomen, no right upper quadrant tenderness, no left upper quadrant tenderness, 

negative psoas sign, negative Rovsing sign, no guarding no masses, no pulsatile 

masses. [] 


Skin: Warm, dry, no erythema, no rash. [] 


Back: No tenderness, no CVA tenderness. [] 


Extremities: No tenderness, no cyanosis, no clubbing, ROM intact, no edema. [] 


Neurologic: Alert and oriented X 3, normal motor function, normal sensory 

function, no focal deficits noted. []


Psychologic: Patient is tearful





Current Patient Data:


Labs:





                                Laboratory Tests








Test


 10/18/21


15:14


 


White Blood Count


 18.7 x10^3/uL


(4.0-11.0)  H


 


Red Blood Count


 4.91 x10^6/uL


(4.30-5.70)


 


Hemoglobin


 15.7 g/dL


(13.0-17.5)


 


Hematocrit


 45.9 %


(39.0-53.0)


 


Mean Corpuscular Volume


 94 fL ()





 


Mean Corpuscular Hemoglobin 32 pg (25-35)  


 


Mean Corpuscular Hemoglobin


Concent 34 g/dL


(31-37)


 


Red Cell Distribution Width


 14.0 %


(11.5-14.5)


 


Platelet Count


 200 x10^3/uL


(140-400)


 


Neutrophils (%) (Auto) 93 % (31-73)  H


 


Lymphocytes (%) (Auto) 2 % (24-48)  L


 


Monocytes (%) (Auto) 4 % (0-9)  


 


Eosinophils (%) (Auto) 1 % (0-3)  


 


Basophils (%) (Auto) 0 % (0-3)  


 


Neutrophils # (Auto)


 17.4 x10^3/uL


(1.8-7.7)  H


 


Lymphocytes # (Auto)


 0.4 x10^3/uL


(1.0-4.8)  L


 


Monocytes # (Auto)


 0.8 x10^3/uL


(0.0-1.1)


 


Eosinophils # (Auto)


 0.1 x10^3/uL


(0.0-0.7)


 


Basophils # (Auto)


 0.0 x10^3/uL


(0.0-0.2)


 


Platelet Estimate Pending  





                                Laboratory Tests


10/18/21 15:14








Vital Signs:





                                   Vital Signs








  Date Time  Temp Pulse Resp B/P (MAP) Pulse Ox O2 Delivery O2 Flow Rate FiO2


 


10/18/21 14:54 97.8 65 20 212/98 (136) 98 Room Air  





 97.8       











EKG:


EK interpreted by Dr. Brooks sinus rhythm heart rate 65 no STEMI []





Radiology/Procedures:


Radiology/Procedures:


[]PROCEDURE: CT ABD PELV W/ IV CONTRST ONLY








CT OF THE ABDOMEN AND PELVIS WITH IV CONTRAST.





History:  Reason: abd pain,n/v/d 





Comparison:None.





Procedure: 


Contiguous axial images of the abdomen and pelvis were performed  after the 

administration of 75 cc of Omni 300 IV contrast.





Oral contrast:  No.





Findings:





There is beam Toney artifact due to hardware from prior L3 S1 laminectomy 

fusion and bilateral total hip arthroplasty.





The transverse colon is collapsed limiting its evaluation. The gallbladder is 

partially collapsed but appears normal. The appendix is normal. There is a few 

mildly distended fluid-filled loops of small bowel.





Liver: Unremarkable


Spleen: Unremarkable


Pancreas: Unremarkable


Adrenal Glands: Thickening of the left adrenal is likely incidental


Kidneys: Unremarkable





There is no mass or lymphadenopathy.





There is no free air.  





There is no free fluid.





The urinary bladder appears normal.





There is an inguinal canal hernia on the left which contains fat and some soft 

tissue density but does not appear to contain bowel.





Impression:





1. Left-sided inguinal canal hernia without evidence of obstruction or 

incarceration.





2. Distended fluid-filled loops of small bowel likely secondary to mild ileus.





End Impression





PQRS Compliance Statement:





One or more of the following individualized dose reduction techniques were 

utilized for this examination:  


1. Automated exposure control  


2. Adjustment of the mA and/or kV according to patient size  


3. Use of iterative reconstruction technique





Electronically signed by: Abel Love III, MD (10/18/2021 4:51 PM) [facility]














DICTATED and SIGNED BY:     ABEL LOVE III, MD


DATE:     10/18/21 [id number]





Course & Med Decision Making:


Course & Med Decision Making


Pertinent Labs and Imaging studies reviewed. (See chart for details)





This is a 66-year-old male patient who presents to the ED today complaining of 

lower abdominal pain, symptoms began today.  Also complaining of vomiting and 

diarrhea.  





Patient denies any fever.  Patient arrives in the ED with a blood pressure of 

212/ 98, heart rate in the 60s he states he did not take his blood pressure 

medicine today because he had pain to his abdomen, at some point he changed the 

story and stated he does not have high blood pressure.








CBC with a WBC of 18.7 with a left shift and bandemia, CMP with no acute 

findings





CT of the abdomen and pelvis was noted for an ileus





Patient was given IV fluids nausea medicine and pain medicine in the ED.  He 

continued to vomit.





Spoke with Dr. Mendoza who accepted patient for admission.





Dragon Disclaimer:


Dragon Disclaimer:


This electronic medical record was generated, in whole or in part, using a voice

 recognition dictation system.





Departure


Departure


Impression:  


   Primary Impression:  


   Intractable nausea and vomiting


   Additional Impressions:  


   Intractable abdominal pain


   Accelerated hypertension


Disposition:   ADMITTED AS INPATIENT


Condition:  STABLE


Referrals:  


SHAINA HAYES MD (PCP)











RICHARD PAK            Oct 18, 2021 15:40

## 2021-10-18 NOTE — RAD
CT OF THE ABDOMEN AND PELVIS WITH IV CONTRAST.



History:  Reason: abd pain,n/v/d 



Comparison:None.



Procedure: 

Contiguous axial images of the abdomen and pelvis were performed  after the administration of 75 cc o
f Omni 300 IV contrast.



Oral contrast:  No.



Findings:



There is beam Toney artifact due to hardware from prior L3 S1 laminectomy fusion and bilateral tota
l hip arthroplasty.



The transverse colon is collapsed limiting its evaluation. The gallbladder is partially collapsed but
 appears normal. The appendix is normal. There is a few mildly distended fluid-filled loops of small 
bowel.



Liver: Unremarkable

Spleen: Unremarkable

Pancreas: Unremarkable

Adrenal Glands: Thickening of the left adrenal is likely incidental

Kidneys: Unremarkable



There is no mass or lymphadenopathy.



There is no free air.  



There is no free fluid.



The urinary bladder appears normal.



There is an inguinal canal hernia on the left which contains fat and some soft tissue density but saeed
s not appear to contain bowel.



Impression:



1. Left-sided inguinal canal hernia without evidence of obstruction or incarceration.



2. Distended fluid-filled loops of small bowel likely secondary to mild ileus.



End Impression



PQRS Compliance Statement:



One or more of the following individualized dose reduction techniques were utilized for this examinat
ion:  

1. Automated exposure control  

2. Adjustment of the mA and/or kV according to patient size  

3. Use of iterative reconstruction technique



Electronically signed by: Marc Ayala III, MD (10/18/2021 4:51 PM) [facility]

## 2021-10-19 VITALS — DIASTOLIC BLOOD PRESSURE: 59 MMHG | SYSTOLIC BLOOD PRESSURE: 146 MMHG

## 2021-10-19 VITALS — DIASTOLIC BLOOD PRESSURE: 65 MMHG | SYSTOLIC BLOOD PRESSURE: 138 MMHG

## 2021-10-19 VITALS — DIASTOLIC BLOOD PRESSURE: 70 MMHG | SYSTOLIC BLOOD PRESSURE: 176 MMHG

## 2021-10-19 LAB
ANION GAP SERPL CALC-SCNC: 8 MMOL/L (ref 6–14)
BASOPHILS # BLD AUTO: 0 X10^3/UL (ref 0–0.2)
BASOPHILS NFR BLD: 0 % (ref 0–3)
BUN SERPL-MCNC: 9 MG/DL (ref 8–26)
CALCIUM SERPL-MCNC: 8.3 MG/DL (ref 8.5–10.1)
CHLORIDE SERPL-SCNC: 107 MMOL/L (ref 98–107)
CO2 SERPL-SCNC: 26 MMOL/L (ref 21–32)
CREAT SERPL-MCNC: 1 MG/DL (ref 0.7–1.3)
EOSINOPHIL NFR BLD: 0 % (ref 0–3)
EOSINOPHIL NFR BLD: 0 X10^3/UL (ref 0–0.7)
ERYTHROCYTE [DISTWIDTH] IN BLOOD BY AUTOMATED COUNT: 13.9 % (ref 11.5–14.5)
GFR SERPLBLD BASED ON 1.73 SQ M-ARVRAT: 91 ML/MIN
GLUCOSE SERPL-MCNC: 103 MG/DL (ref 70–99)
HCT VFR BLD CALC: 45 % (ref 39–53)
HGB BLD-MCNC: 14.8 G/DL (ref 13–17.5)
LYMPHOCYTES # BLD: 1.6 X10^3/UL (ref 1–4.8)
LYMPHOCYTES NFR BLD AUTO: 11 % (ref 24–48)
MAGNESIUM SERPL-MCNC: 2.3 MG/DL (ref 1.8–2.4)
MCH RBC QN AUTO: 31 PG (ref 25–35)
MCHC RBC AUTO-ENTMCNC: 33 G/DL (ref 31–37)
MCV RBC AUTO: 94 FL (ref 79–100)
MONO #: 0.5 X10^3/UL (ref 0–1.1)
MONOCYTES NFR BLD: 4 % (ref 0–9)
NEUT #: 12.2 X10^3/UL (ref 1.8–7.7)
NEUTROPHILS NFR BLD AUTO: 85 % (ref 31–73)
PHOSPHATE SERPL-MCNC: 2.5 MG/DL (ref 2.6–4.7)
PLATELET # BLD AUTO: 207 X10^3/UL (ref 140–400)
POTASSIUM SERPL-SCNC: 3.7 MMOL/L (ref 3.5–5.1)
RBC # BLD AUTO: 4.79 X10^6/UL (ref 4.3–5.7)
SODIUM SERPL-SCNC: 141 MMOL/L (ref 136–145)
WBC # BLD AUTO: 14.3 X10^3/UL (ref 4–11)

## 2021-10-19 RX ADMIN — BACITRACIN SCH MLS/HR: 5000 INJECTION, POWDER, FOR SOLUTION INTRAMUSCULAR at 04:15

## 2021-10-19 RX ADMIN — DICYCLOMINE HYDROCHLORIDE PRN MG: 10 INJECTION, SOLUTION INTRAMUSCULAR at 04:28

## 2021-10-19 RX ADMIN — PANTOPRAZOLE SODIUM SCH MG: 40 INJECTION, POWDER, FOR SOLUTION INTRAVENOUS at 08:54

## 2021-10-19 RX ADMIN — BACITRACIN SCH MLS/HR: 5000 INJECTION, POWDER, FOR SOLUTION INTRAMUSCULAR at 14:15

## 2021-10-19 NOTE — NUR
Discharge Note:



OSMIN BARRAZA



Discharge instructions and discharge home medications reviewed with Patient and a copy 
given. All questions have been answered and understanding verbalized. 



The following instructions and handouts were given: discharge instructions, new 
prescription, education and follow up recommendations.



Discontinued lines and drains: Peripheral IV discontinued intact.



Patient discharged to Home or Self Care with Family Member via [facility] off unit by CNA.

## 2021-10-19 NOTE — NUR
CHARLENE following. Discussed with RN, pt from home, room air, NPO, COVID-19 negative. THERESA consult 
for cocaine use/abuse. CHARLENE will continue to follow.

-------------------------------------------------------------------------------

Addendum: 10/19/21 at 1611 by RHETT CHANG

-------------------------------------------------------------------------------

Dl LYNN) met with pt, pt very stressed lately, smokes weed for pain control, uses 
cocaine socially. No mental health history. Pt provided with [facility] and [facility]SI, stated he may 
call [facility]. Theresa cleared.

## 2021-11-24 ENCOUNTER — HOSPITAL ENCOUNTER (EMERGENCY)
Dept: HOSPITAL 61 - ER | Age: 66
Discharge: HOME | End: 2021-11-24
Payer: COMMERCIAL

## 2021-11-24 VITALS — WEIGHT: 178.35 LBS | BODY MASS INDEX: 27.99 KG/M2 | HEIGHT: 67 IN

## 2021-11-24 VITALS — DIASTOLIC BLOOD PRESSURE: 66 MMHG | SYSTOLIC BLOOD PRESSURE: 163 MMHG

## 2021-11-24 DIAGNOSIS — G89.29: ICD-10-CM

## 2021-11-24 DIAGNOSIS — K52.9: Primary | ICD-10-CM

## 2021-11-24 DIAGNOSIS — Z20.822: ICD-10-CM

## 2021-11-24 LAB
ALBUMIN SERPL-MCNC: 3.4 G/DL (ref 3.4–5)
ALBUMIN/GLOB SERPL: 1 {RATIO} (ref 1–1.7)
ALP SERPL-CCNC: 68 U/L (ref 46–116)
ALT SERPL-CCNC: 29 U/L (ref 16–63)
ANION GAP SERPL CALC-SCNC: 6 MMOL/L (ref 6–14)
AST SERPL-CCNC: 21 U/L (ref 15–37)
BASOPHILS # BLD AUTO: 0 X10^3/UL (ref 0–0.2)
BASOPHILS NFR BLD: 0 % (ref 0–3)
BILIRUB SERPL-MCNC: 0.2 MG/DL (ref 0.2–1)
BUN SERPL-MCNC: 12 MG/DL (ref 8–26)
BUN/CREAT SERPL: 11 (ref 6–20)
CALCIUM SERPL-MCNC: 8.3 MG/DL (ref 8.5–10.1)
CHLORIDE SERPL-SCNC: 105 MMOL/L (ref 98–107)
CO2 SERPL-SCNC: 31 MMOL/L (ref 21–32)
CREAT SERPL-MCNC: 1.1 MG/DL (ref 0.7–1.3)
EOSINOPHIL NFR BLD: 0.2 X10^3/UL (ref 0–0.7)
EOSINOPHIL NFR BLD: 2 % (ref 0–3)
ERYTHROCYTE [DISTWIDTH] IN BLOOD BY AUTOMATED COUNT: 13.9 % (ref 11.5–14.5)
GFR SERPLBLD BASED ON 1.73 SQ M-ARVRAT: 81.5 ML/MIN
GLUCOSE SERPL-MCNC: 104 MG/DL (ref 70–99)
HCT VFR BLD CALC: 45.3 % (ref 39–53)
HGB BLD-MCNC: 15.1 G/DL (ref 13–17.5)
INFLUENZA A PATIENT: NEGATIVE
INFLUENZA B PATIENT: NEGATIVE
LYMPHOCYTES # BLD: 1 X10^3/UL (ref 1–4.8)
LYMPHOCYTES NFR BLD AUTO: 10 % (ref 24–48)
MCH RBC QN AUTO: 32 PG (ref 25–35)
MCHC RBC AUTO-ENTMCNC: 33 G/DL (ref 31–37)
MCV RBC AUTO: 94 FL (ref 79–100)
MONO #: 0.5 X10^3/UL (ref 0–1.1)
MONOCYTES NFR BLD: 5 % (ref 0–9)
NEUT #: 8.5 X10^3/UL (ref 1.8–7.7)
NEUTROPHILS NFR BLD AUTO: 83 % (ref 31–73)
PLATELET # BLD AUTO: 194 X10^3/UL (ref 140–400)
POTASSIUM SERPL-SCNC: 3.6 MMOL/L (ref 3.5–5.1)
PROT SERPL-MCNC: 6.7 G/DL (ref 6.4–8.2)
RBC # BLD AUTO: 4.8 X10^6/UL (ref 4.3–5.7)
SODIUM SERPL-SCNC: 142 MMOL/L (ref 136–145)
WBC # BLD AUTO: 10.3 X10^3/UL (ref 4–11)

## 2021-11-24 PROCEDURE — 87426 SARSCOV CORONAVIRUS AG IA: CPT

## 2021-11-24 PROCEDURE — 96374 THER/PROPH/DIAG INJ IV PUSH: CPT

## 2021-11-24 PROCEDURE — 85025 COMPLETE CBC W/AUTO DIFF WBC: CPT

## 2021-11-24 PROCEDURE — U0003 INFECTIOUS AGENT DETECTION BY NUCLEIC ACID (DNA OR RNA); SEVERE ACUTE RESPIRATORY SYNDROME CORONAVIRUS 2 (SARS-COV-2) (CORONAVIRUS DISEASE [COVID-19]), AMPLIFIED PROBE TECHNIQUE, MAKING USE OF HIGH THROUGHPUT TECHNOLOGIES AS DESCRIBED BY CMS-2020-01-R: HCPCS

## 2021-11-24 PROCEDURE — 36415 COLL VENOUS BLD VENIPUNCTURE: CPT

## 2021-11-24 PROCEDURE — 87804 INFLUENZA ASSAY W/OPTIC: CPT

## 2021-11-24 PROCEDURE — 80053 COMPREHEN METABOLIC PANEL: CPT

## 2021-11-24 PROCEDURE — 96361 HYDRATE IV INFUSION ADD-ON: CPT

## 2021-11-24 PROCEDURE — 99285 EMERGENCY DEPT VISIT HI MDM: CPT

## 2021-11-24 NOTE — PHYS DOC
Past Medical History


Past Medical History:  Other


Additional Past Medical Histor:  CHRONIC PAIN, RLS


Past Surgical History:  Hip Replacement


Additional Past Surgical Histo:  BACK, HERNIA


Smoking Status:  Current Some Day Smoker


Alcohol Use:  None


Drug Use:  None





General Adult


EDM:


Chief Complaint:  NAUSEA/VOMITING/DIARRHEA





HPI:


HPI:





Patient is a 66  year old male presents for evaluation nausea vomiting diarrhea.

 Today patient had started to have some nasal drainage just prior to arrival 

patient started to have vomiting and diarrhea.  Patient states patient is 

vaccinated.  He has been staying in the hospital with his son who is currently 

Covid positive with a past medical history of mental disorder.





Review of Systems:


Review of Systems:


Constitutional:   Denies fever or chills. []


Eyes:   Denies change in visual acuity. []


HENT:   No sore throat. [] Positive nasal congestion


Respiratory:   Denies cough or shortness of breath. [] 


Cardiovascular:   Denies chest pain or edema. [] 


GI:   Positive nausea positive vomiting positive diarrhea


:  Denies dysuria. [] 


Musculoskeletal:   Denies back pain or joint pain. [] 


Integument:   Denies rash. [] 


Neurologic:   Denies headache, focal weakness or sensory changes. [] 


Endocrine:   Denies polyuria or polydipsia. [] 


Lymphatic:  Denies swollen glands. [] 


Psychiatric:  Denies depression or anxiety. []





Heart Score:


C/O Chest Pain:  N/A


Risk Factors:


Risk Factors:  DM, Current or recent (<one month) smoker, HTN, HLP, family 

history of CAD, obesity.


Risk Scores:


Score 0 - 3:  2.5% MACE over next 6 weeks - Discharge Home


Score 4 - 6:  20.3% MACE over next 6 weeks - Admit for Clinical Observation


Score 7 - 10:  72.7% MACE over next 6 weeks - Early Invasive Strategies





Current Medications:





Current Medications








 Medications


  (Trade)  Dose


 Ordered  Sig/Andrew  Start Time


 Stop Time Status Last Admin


Dose Admin


 


 Ondansetron HCl


  (Zofran)  4 mg  1X  ONCE  11/24/21 04:00


 11/24/21 04:01 DC 11/24/21 03:46


4 MG


 


 Sodium Chloride  1,000 ml @ 


 1,000 mls/hr  1X  ONCE  11/24/21 04:00


 11/24/21 04:59  11/24/21 03:45


1,000 MLS/HR











Allergies:


Allergies:





Allergies








Coded Allergies Type Severity Reaction Last Updated Verified


 


  No Known Drug Allergies    5/9/21 No











Physical Exam:


PE:





Constitutional: Well developed, well nourished, no acute distress, non-toxic 

appearance. []


HENT: Normocephalic, atraumatic, bilateral external ears normal, oropharynx 

moist, no oral exudates, nose normal. []


Eyes: PERRLA, EOMI, conjunctiva normal, no discharge. [] 


Neck: Normal range of motion, no tenderness, supple, no stridor. [] 


Cardiovascular:Heart rate regular rhythm, no murmur []


Lungs & Thorax:  Bilateral breath sounds clear to auscultation []


Abdomen: Bowel sounds normal, soft, no tenderness, no masses, no pulsatile 

masses. [] 


Skin: Warm, dry, no erythema, no rash. [] 


Back: No tenderness, no CVA tenderness. [] 


Extremities: No tenderness, no cyanosis, no clubbing, ROM intact, no edema. [] 


Neurologic: Alert and oriented X 3, normal motor function, normal sensory 

function, no focal deficits noted. []


Psychologic: Affect normal, judgement normal, mood normal. []





Current Patient Data:


Labs:





                                Laboratory Tests








Test


 11/24/21


03:38 11/24/21


03:40


 


White Blood Count


 10.3 x10^3/uL


(4.0-11.0) 





 


Red Blood Count


 4.80 x10^6/uL


(4.30-5.70) 





 


Hemoglobin


 15.1 g/dL


(13.0-17.5) 





 


Hematocrit


 45.3 %


(39.0-53.0) 





 


Mean Corpuscular Volume


 94 fL ()


 





 


Mean Corpuscular Hemoglobin 32 pg (25-35)   


 


Mean Corpuscular Hemoglobin


Concent 33 g/dL


(31-37) 





 


Red Cell Distribution Width


 13.9 %


(11.5-14.5) 





 


Platelet Count


 194 x10^3/uL


(140-400) 





 


Neutrophils (%) (Auto) 83 % (31-73)  H 


 


Lymphocytes (%) (Auto) 10 % (24-48)  L 


 


Monocytes (%) (Auto) 5 % (0-9)   


 


Eosinophils (%) (Auto) 2 % (0-3)   


 


Basophils (%) (Auto) 0 % (0-3)   


 


Neutrophils # (Auto)


 8.5 x10^3/uL


(1.8-7.7)  H 





 


Lymphocytes # (Auto)


 1.0 x10^3/uL


(1.0-4.8) 





 


Monocytes # (Auto)


 0.5 x10^3/uL


(0.0-1.1) 





 


Eosinophils # (Auto)


 0.2 x10^3/uL


(0.0-0.7) 





 


Basophils # (Auto)


 0.0 x10^3/uL


(0.0-0.2) 





 


Sodium Level


 142 mmol/L


(136-145) 





 


Potassium Level


 3.6 mmol/L


(3.5-5.1) 





 


Chloride Level


 105 mmol/L


() 





 


Carbon Dioxide Level


 31 mmol/L


(21-32) 





 


Anion Gap 6 (6-14)   


 


Blood Urea Nitrogen


 12 mg/dL


(8-26) 





 


Creatinine


 1.1 mg/dL


(0.7-1.3) 





 


Estimated GFR


(Cockcroft-Gault) 81.5  


 





 


BUN/Creatinine Ratio 11 (6-20)   


 


Glucose Level


 104 mg/dL


(70-99)  H 





 


Calcium Level


 8.3 mg/dL


(8.5-10.1)  L 





 


Total Bilirubin


 0.2 mg/dL


(0.2-1.0) 





 


Aspartate Amino Transferase


(AST) 21 U/L (15-37)


 





 


Alanine Aminotransferase (ALT)


 29 U/L (16-63)


 





 


Alkaline Phosphatase


 68 U/L


() 





 


Total Protein


 6.7 g/dL


(6.4-8.2) 





 


Albumin


 3.4 g/dL


(3.4-5.0) 





 


Albumin/Globulin Ratio 1.0 (1.0-1.7)   


 


Influenza Type A Antigen


 


 Negative


(NEGATIVE)


 


Influenza Type B Antigen


 


 Negative


(NEGATIVE)





                                Laboratory Tests


11/24/21 03:38








                                Laboratory Tests


11/24/21 03:38








Vital Signs:





                                   Vital Signs








  Date Time  Temp Pulse Resp B/P (MAP) Pulse Ox O2 Delivery O2 Flow Rate FiO2


 


11/24/21 03:38 98.1 69 18 177/86 (116) 95 Room Air  





 98.1       











EKG:


EKG:


[]





Radiology/Procedures:


Radiology/Procedures:


[]





Course & Med Decision Making:


Course & Med Decision Making


Pertinent Labs and Imaging studies reviewed. (See chart for details)





[] Patient treatment included IV fluids.  CBC CMP without acute abnormalities 

influenza and rapid Covid negative





Dragon Disclaimer:


Dragon Disclaimer:


This electronic medical record was generated, in whole or in part, using a voice

 recognition dictation system.





Departure


Departure


Impression:  


   Primary Impression:  


   Acute gastroenteritis


Disposition:  01 HOME / SELF CARE / HOMELESS


Condition:  STABLE


Referrals:  


SHAINA HAYES MD (PCP)


Patient Instructions:  Viral Gastroenteritis


Scripts


Ondansetron Hcl (ZOFRAN) 4 Mg Tablet


1 TAB PO Q6HRS, #20 TAB


   Prov: ODILON LOAIZA DO         11/24/21











ODILON LOAIZA DO            Nov 24, 2021 04:48

## 2021-12-26 ENCOUNTER — HOSPITAL ENCOUNTER (EMERGENCY)
Dept: HOSPITAL 61 - ER | Age: 66
Discharge: HOME | End: 2021-12-26
Payer: COMMERCIAL

## 2021-12-26 VITALS — BODY MASS INDEX: 27.16 KG/M2 | WEIGHT: 173.06 LBS | HEIGHT: 67 IN

## 2021-12-26 VITALS — DIASTOLIC BLOOD PRESSURE: 70 MMHG | SYSTOLIC BLOOD PRESSURE: 160 MMHG

## 2021-12-26 DIAGNOSIS — G25.81: ICD-10-CM

## 2021-12-26 DIAGNOSIS — F17.200: ICD-10-CM

## 2021-12-26 DIAGNOSIS — N30.90: Primary | ICD-10-CM

## 2021-12-26 DIAGNOSIS — G89.29: ICD-10-CM

## 2021-12-26 LAB
ANION GAP SERPL CALC-SCNC: 7 MMOL/L (ref 6–14)
APTT PPP: (no result) S
BACTERIA #/AREA URNS HPF: (no result) /HPF
BILIRUB UR QL STRIP: (no result)
BUN SERPL-MCNC: 12 MG/DL (ref 8–26)
CALCIUM SERPL-MCNC: 8.5 MG/DL (ref 8.5–10.1)
CHLORIDE SERPL-SCNC: 106 MMOL/L (ref 98–107)
CO2 SERPL-SCNC: 28 MMOL/L (ref 21–32)
CREAT SERPL-MCNC: 0.9 MG/DL (ref 0.7–1.3)
FIBRINOGEN PPP-MCNC: (no result) MG/DL
GFR SERPLBLD BASED ON 1.73 SQ M-ARVRAT: 102.8 ML/MIN
GLUCOSE SERPL-MCNC: 86 MG/DL (ref 70–99)
NITRITE UR QL STRIP: POSITIVE
PH UR STRIP: 6 [PH]
POTASSIUM SERPL-SCNC: 3.4 MMOL/L (ref 3.5–5.1)
PROT UR STRIP-MCNC: >=300 MG/DL
RBC #/AREA URNS HPF: (no result) /HPF (ref 0–2)
SODIUM SERPL-SCNC: 141 MMOL/L (ref 136–145)
UROBILINOGEN UR-MCNC: 1 MG/DL
WBC #/AREA URNS HPF: (no result) /HPF (ref 0–4)

## 2021-12-26 PROCEDURE — 81001 URINALYSIS AUTO W/SCOPE: CPT

## 2021-12-26 PROCEDURE — 87077 CULTURE AEROBIC IDENTIFY: CPT

## 2021-12-26 PROCEDURE — 87086 URINE CULTURE/COLONY COUNT: CPT

## 2021-12-26 PROCEDURE — 74176 CT ABD & PELVIS W/O CONTRAST: CPT

## 2021-12-26 PROCEDURE — 99285 EMERGENCY DEPT VISIT HI MDM: CPT

## 2021-12-26 PROCEDURE — 87186 SC STD MICRODIL/AGAR DIL: CPT

## 2021-12-26 PROCEDURE — 36415 COLL VENOUS BLD VENIPUNCTURE: CPT

## 2021-12-26 PROCEDURE — 80048 BASIC METABOLIC PNL TOTAL CA: CPT

## 2021-12-26 PROCEDURE — 96374 THER/PROPH/DIAG INJ IV PUSH: CPT

## 2021-12-26 NOTE — PHYS DOC
Past Medical History


Past Medical History:  Other


Additional Past Medical Histor:  CHRONIC PAIN, RLS


Past Surgical History:  Hip Replacement


Additional Past Surgical Histo:  BACK, HERNIA, bilateral hip replacement


Smoking Status:  Current Some Day Smoker


Alcohol Use:  None


Drug Use:  None





General Adult


EDM:


Chief Complaint:  BLOOD IN URINE





HPI:


HPI:


66-year-old male past medical history of tobacco use, restless leg syndrome and 

chronic back pain, presents the ED with complaints of painful urination, blood 

in urine, suprapubic pain described as cramping that radiates to the left pelvic

and left flank region with urinating, present for the past 2 days, stating he 

has never had the symptoms before.  Pain is described as burning/cramping in 

nature and worse with urinating.  Is not on any blood thinners or 

anticoagulants.  No history of blood transfusions or kidney stones.  Denies any 

blunt trauma. No associated urethral discharge or  rash. No recent surgeries, 

hospitalizations or  procedures (no stokes hx).





Review of Systems:


Review of Systems:


Constitutional:   Denies fever or chills. []


Eyes:   Denies change in visual acuity. []


HENT:   Denies nasal congestion or sore throat. [] 


Respiratory:   Denies cough or shortness of breath. [] 


Cardiovascular:   Denies chest pain or edema. [] 


GI:   Denies abdominal pain, nausea, vomiting, bloody stools or diarrhea. [] 


:  Denies urethral discharge or incontinence


Musculoskeletal:   Denies midline back pain or joint pain. [] 


Integument:   Denies rash or diaphoresis


Neurologic:   Denies headache, focal weakness or sensory changes. [] 


Endocrine:   Denies polyuria or polydipsia. [] 


Lymphatic:  Denies swollen glands. [] 


Psychiatric:  Denies depression or anxiety. []





Heart Score:


C/O Chest Pain:  No


Risk Factors:


Risk Factors:  DM, Current or recent (<one month) smoker, HTN, HLP, family 

history of CAD, obesity.


Risk Scores:


Score 0 - 3:  2.5% MACE over next 6 weeks - Discharge Home


Score 4 - 6:  20.3% MACE over next 6 weeks - Admit for Clinical Observation


Score 7 - 10:  72.7% MACE over next 6 weeks - Early Invasive Strategies





Current Medications:





Current Medications








 Medications


  (Trade)  Dose


 Ordered  Sig/Andrew  Start Time


 Stop Time Status Last Admin


Dose Admin


 


 Acetaminophen/


 Hydrocodone Bitart


  (Lortab 5/325)  1 tab  1X  ONCE  21 08:45


 21 08:46 DC 21 08:50


1 TAB


 


 Ceftriaxone Sodium


  (Rocephin)  1 gm  1X  ONCE  21 09:30


 21 09:31 DC 21 09:50


1 GM


 


 Phenazopyridine


 HCl


  (Pyridium)  200 mg  1X  ONCE  21 10:00


 21 10:01 DC 21 10:05


200 MG











Allergies:


Allergies:





Allergies








Coded Allergies Type Severity Reaction Last Updated Verified


 


  No Known Drug Allergies    21 No











Physical Exam:


PE:





Constitutional: Well developed, well nourished, no acute distress, non-toxic 

appearance. 


HENT: Normocephalic, atraumatic,


Eyes: EOMI, conjunctiva normal, no discharge.  


Neck: Normal range of motion,  supple, 


Cardiovascular: S1/2 present, regular rhythm


Lungs & Thorax: Speaking in full sentences, bilateral equal chest rise, no 

tachypnea or increased work of breathing


Abdomen:  soft, no tenderness, reports left pelvic pain with palpation-no 

rigidity or guarding, 


Skin: Warm, dry, no erythema, no rash. [] 


Back: No tenderness, no CVA tenderness. [] 


Extremities: No tenderness, no cyanosis, no lower extremity edema


Neurologic: Alert and oriented X 3, normal motor function, normal sensory func

tion, no focal deficits noted. []


Psychologic: Affect normal, judgement normal, mood normal. []





Current Patient Data:


Labs:





                                Laboratory Tests








Test


 21


08:15 21


08:55


 


Urine Collection Type Void   


 


Urine Color Red   


 


Urine Clarity Cloudy   


 


Urine pH


 6.0 (<5.0-8.0)


 





 


Urine Specific Gravity


 1.020


(1.000-1.030) 





 


Urine Protein


 >=300 mg/dL


(NEG-TRACE) 





 


Urine Glucose (UA)


 Negative mg/dL


(NEG) 





 


Urine Ketones (Stick)


 Trace mg/dL


(NEG) 





 


Urine Blood Large (NEG)   


 


Urine Nitrite


 Positive (NEG)


 





 


Urine Bilirubin Small (NEG)   


 


Urine Urobilinogen Dipstick


 1.0 mg/dL (0.2


mg/dL) 





 


Urine Leukocyte Esterase Large (NEG)   


 


Urine RBC


 Tntc /HPF


(0-2) 





 


Urine WBC


 Tntc /HPF


(0-4) 





 


Urine Squamous Epithelial


Cells Few /LPF  


 





 


Urine Bacteria


 Many /HPF


(0-FEW) 





 


Urine Mucus Marked /LPF   


 


Sodium Level


 


 141 mmol/L


(136-145)


 


Potassium Level


 


 3.4 mmol/L


(3.5-5.1)  L


 


Chloride Level


 


 106 mmol/L


()


 


Carbon Dioxide Level


 


 28 mmol/L


(21-32)


 


Anion Gap  7 (6-14)  


 


Blood Urea Nitrogen


 


 12 mg/dL


(8-26)


 


Creatinine


 


 0.9 mg/dL


(0.7-1.3)


 


Estimated GFR


(Cockcroft-Gault) 


 102.8  





 


Glucose Level


 


 86 mg/dL


(70-99)


 


Calcium Level


 


 8.5 mg/dL


(8.5-10.1)





                                Laboratory Tests


21 08:55








Vital Signs:





                                   Vital Signs








  Date Time  Temp Pulse Resp B/P (MAP) Pulse Ox O2 Delivery O2 Flow Rate FiO2


 


21 08:50   18  97   


 


21 08:20 97.9 74  182/81 (114)  Room Air  





 97.9       











EKG:


EKG:


[]





Radiology/Procedures:


Radiology/Procedures:


                                 IMAGING REPORT





                                     Signed





PATIENT: OSMIN BARRAZA ACCOUNT: [id number]     MRN#: [id number]


: 1955           LOCATION: ER              AGE: 66


SEX: M                    EXAM DT: 21         ACCESSION#: [id number]


STATUS: REG ER            ORD. PHYSICIAN: ALFREDO DELGADO DO


REASON: left flank pain, hematuria, dysuria


PROCEDURE: CT ABDOMEN PELVIS WO CONTRAST





PQRS Compliance Statement:





One or more of the following individualized dose reduction techniques were 

utilized for this examination:  


1. Automated exposure control  


2. Adjustment of the mA and/or kV according to patient size  


3. Use of iterative reconstruction technique








CT ABDOMEN+PELVIS WO





Clinical Indication: Reason: left flank pain, hematuria, dysuria / Spl. Instruc

tions:  / History: 





Comparison: CT abdomen and pelvis with contrast 2020.





Technique: Helical CT imaging of the abdomen and pelvis is performed without IV 

or oral contrast.





Findings: 


Evaluation of solid organs and bowel is limited without oral and IV contrast, 

decreasing sensitivity for detection of pathology.





There is minimal atelectasis or scarring in the lung bases. Large calcified 

granuloma lateral left lower lobe. Cardiac size normal.





Liver, gallbladder, spleen, pancreas, and abdominal aorta caliber are normal. 

There is bilateral adrenal gland hyperplasia.





 The right kidney is normal. There may be minimal left hydroureteronephrosis. 

There is left periureteral stranding. There is minimal left perinephric 

stranding, nonspecific. The distal ureters and the urinary bladder are 

completely obscured due to beam hardening artifact from bilateral hip 

arthroplasties. Cannot exclude a distal ureteral calculus. There is a punctate 

nonobstructing calculus in the upper pole of the left kidney.





The stomach is unremarkable. Small fat-containing umbilical hernia. No small 

bowel obstruction is identified. There is descending colon diverticulosis. No 

colon wall thickening is identified. The appendix is normal.





Cannot exclude mild pelvic free fluid.





There is redemonstrated posterior fusion hardware of L3-S1. There is posterior 

decompression of L4-L5.





IMPRESSION:


1.  There is mild left perinephric and periureteral stranding. There may be 

minimal left hydroureteronephrosis. The distal ureters and urinary bladder are 

completely obscured due to beam hardening artifact from bilateral hip 

arthroplasties. Cannot exclude a distal left ureteral calculus and mild left 

obstructive uropathy versus urinary tract infection/pyelonephritis.


2.  Descending colon diverticulosis.





Electronically signed by: Ac Givens MD (2021 9:31 AM) [facility]














DICTATED and SIGNED BY:     AC GIVENS MD


DATE:     21 [id number]





Course & Med Decision Making:


Course & Med Decision Making


Pertinent Labs and Imaging studies reviewed. (See chart for details)





Concern for urinary tract infection and cystitis.   CT imaging cannot fully 

evaluate for obstruction, possible pyelonephritis.  Patient tolerating oral int

wendy. Patient with no fever, chills, nausea, vomiting or dehydration-clinically 

is not presenting with pyelonephritis.  Pain improved in emergency department 

with Norco and Azo. Pt with no pain out of proportion, is comfortable. CT 

imaging viewed by myself-no aorta enlargement-aaa or dissection of low suspicion

 given well appearing exam. Will treat for complicated UTI.  Patient with 

asymptomatic hypertension with no evidence of renal insufficiency.  Repeat blood

 pressure on ED monitor shows 157/81.  Will discharge home with strict ED return

 precautions were given for severe pain, syncope, brisk of bleeding or 

neurologic deficits. Encouraged urgent outpatient follow-up with PMD for repeat 

follow-up, urology for definitive management/further imaging.  Life-threatening 

processes were considered but are low suspicion at this time, given history, 

physical exam and ED workup. Pt was educated on all prescription medications and

 adverse effects.  All patient's questions were answered and pt was stable at 

time of discharge.





Life/limb-threatening differential includes but is not limited to, trauma, 

infection, nephrolithiasis, kidney disease, malignancy, obstructive uropathy, 

BPH, AAA/AVF/aortic dissection, or schistosomiasis. 





I have spoken with the patient and/or caregivers.  I explained the patient's 

condition, diagnoses and treatment plan based on the information available to me

 at this time.  I have answered the patient and/or caregiver's questions and 

addressed any concerns.  The patient and/or caregivers have a good understanding

 of patient's diagnosis, condition and treatment plan as can be expected at this

 point.  Vital signs have been stable.  Patient's condition is stable and 

appropriate for discharge from the emergency department. 





Patient will pursue further outpatient evaluation with primary care physician or

 other designated or consulting physician as outlined in the discharge 

instructions.  The patient and/or caregivers are agreeable to this plan of care 

and follow-up instructions have been explained in detail.  The patient and/or 

caregivers have received these instructions in written form and have expressed 

an understanding of the discharge instructions.  The patient and/or caregivers 

are aware that any significant change of condition or worsening of symptoms 

should prompt immediate return to this or the closest emergency department or 

call to 911.





Tamara Disclaimer:


Dragon Disclaimer:


This electronic medical record was generated, in whole or in part, using a voice

 recognition dictation system.





Departure


Departure


Impression:  


   Primary Impression:  


   Complicated urinary tract infection


   Additional Impression:  


   Cystitis


Disposition:   HOME / SELF CARE / HOMELESS


Condition:  STABLE


Referrals:  


SHAINA HAYES MD (PCP)


Follow-up with your primary care physician for reevaluation and blood pressure 

check OR


FOLLOW UP WITH FAMILY MEDICINE:


[address], [address]


[place] [address]


Phone: [phone]


Patient Instructions:  Hematuria, Adult, Urinary Tract Infection





Additional Instructions:  


FOLLOW UP WITH UROLOGY: FOR DEFINITIVE MANAGEMENT of hematuria/further 

evaluation and imaging if needed


[facility]


[address] [place] [address]


[phone]





[facility]


[address]


[place] [address]


[phone]





[facility]


[address]


[place] [address]


[phone]





EMERGENCY DEPARTMENT GENERAL DISCHARGE INSTRUCTIONS





Thank you for coming to [facility] Emergency Department (ED) 

today and 


trusting us with you care.  We trust that you had a positive experience in our 

Emergency 


Department.  If you wish to speak to the department management, you may call the

 Director at 


[phone].





YOUR FOLLOW UP INSTRUCTIONS ARE AS FOLLOWS:





1.  Do you have a private Doctor?  If you do not have a private doctor, please 

ask for a 


resource list of physicians or clinics that may be able to assist you with 

follow up care.





2.  The Emergency Physicain has interpreted your x-rays.  The X-Ray specialist 

will also 


review them.  If there is a change in the findings, you will be notified in 48 

hours when at 


all possible.





3.  A lab test or culture has been done, your results will be reviewed and you 

will be 


notified if you need a change in treatment.





ADDITIONAL INSTRUCTIONS AND INFORMATION:





1.  Your care today has been supervised by a physician who is specially trained 

in emergency 


care.  Many problems require more than one evaluation for a complete diagnosis 

and 


treatment.  We recommend that you schedule your follow up appointment as 

recommended to 


ensure complete treatment of you illness or injury.  If you are unable to obtain

 follow up 


care and continue to have a problem, or if your condition worsens, we recommend 

that you 


return to the ED.





2.  We are not able to safely determine your condition over the phone nor are we

 able to 


give sound medical advice over the phone.  For these safety reasons, if you call

 for medical 


advice we will ask you to come to the ED for further evaluation.





3.  If you have any questions regarding these discharge instructions please call

 the ED at 


[phone].





SAFETY INFORMATION:





In the interest of safety, wellness, and injury prevention; we encourage you to 

wear your 


sealbelt, if you smoke; quite smoking, and we encourage family to use a prote

ctive helmet 


for bicycling and other sporting events that present an increased risk for head 

injury.





IF YOUR SYMPTOMS WORSEN OR NEW SYMPTOMS DEVELOP, OR YOU HAVE CONCERNS ABOUT YOUR

 CONDITION; 


OR IF YOUR CONDITION WORSENS WHILE YOU ARE WAITING FOR YOUR FOLLOW UP 

APPOINTMENT; EITHER 


CONTACT YOUR PRIMARY CARE DOCTOR, THE PHYSICIAN WHOSE NAME AND NUMBER YOU WERE 

GIVEN, OR 


RETURN TO THE ED IMMEDIATELY.


Scripts


Phenazopyridine Hcl (PHENAZOPYRIDINE HCL) 100 Mg Tablet


1 TAB PO TID for urinary discomfort for 3 Days, #9 TAB 0 Refills


   Prov: ALFREDO DELGADO DO         21 


Cefpodoxime Proxetil (CEFPODOXIME PROXETIL) 200 Mg Tablet


1 TAB PO BID for 14 Days, #28 TAB


   Prov: ALFREDO DELGADO DO         21











ALFREDO DELGADO DO               Dec 26, 2021 11:20

## 2021-12-26 NOTE — RAD
PQRS Compliance Statement:



One or more of the following individualized dose reduction techniques were utilized for this examinat
ion:  

1. Automated exposure control  

2. Adjustment of the mA and/or kV according to patient size  

3. Use of iterative reconstruction technique





CT ABDOMEN+PELVIS WO



Clinical Indication: Reason: left flank pain, hematuria, dysuria / Spl. Instructions:  / History: 



Comparison: CT abdomen and pelvis with contrast January 20, 2020.



Technique: Helical CT imaging of the abdomen and pelvis is performed without IV or oral contrast.



Findings: 

Evaluation of solid organs and bowel is limited without oral and IV contrast, decreasing sensitivity 
for detection of pathology.



There is minimal atelectasis or scarring in the lung bases. Large calcified granuloma lateral left lo
wer lobe. Cardiac size normal.



Liver, gallbladder, spleen, pancreas, and abdominal aorta caliber are normal. There is bilateral adre
nal gland hyperplasia.



 The right kidney is normal. There may be minimal left hydroureteronephrosis. There is left periurete
ral stranding. There is minimal left perinephric stranding, nonspecific. The distal ureters and the u
rinary bladder are completely obscured due to beam hardening artifact from bilateral hip arthroplasti
es. Cannot exclude a distal ureteral calculus. There is a punctate nonobstructing calculus in the upp
er pole of the left kidney.



The stomach is unremarkable. Small fat-containing umbilical hernia. No small bowel obstruction is myesha
ntified. There is descending colon diverticulosis. No colon wall thickening is identified. The append
ix is normal.



Cannot exclude mild pelvic free fluid.



There is redemonstrated posterior fusion hardware of L3-S1. There is posterior decompression of L4-L5
.



IMPRESSION:

1.  There is mild left perinephric and periureteral stranding. There may be minimal left hydrouretero
nephrosis. The distal ureters and urinary bladder are completely obscured due to beam hardening artif
act from bilateral hip arthroplasties. Cannot exclude a distal left ureteral calculus and mild left o
bstructive uropathy versus urinary tract infection/pyelonephritis.

2.  Descending colon diverticulosis.



Electronically signed by: Ac Givens MD (12/26/2021 9:31 AM) [facility]

## 2022-02-03 ENCOUNTER — HOSPITAL ENCOUNTER (EMERGENCY)
Dept: HOSPITAL 61 - ER | Age: 67
Discharge: HOME | End: 2022-02-03
Payer: COMMERCIAL

## 2022-02-03 VITALS — WEIGHT: 178.35 LBS | BODY MASS INDEX: 27.99 KG/M2 | HEIGHT: 67 IN

## 2022-02-03 VITALS — SYSTOLIC BLOOD PRESSURE: 142 MMHG | DIASTOLIC BLOOD PRESSURE: 61 MMHG

## 2022-02-03 DIAGNOSIS — G89.29: ICD-10-CM

## 2022-02-03 DIAGNOSIS — G25.81: ICD-10-CM

## 2022-02-03 DIAGNOSIS — F17.200: ICD-10-CM

## 2022-02-03 DIAGNOSIS — N30.01: Primary | ICD-10-CM

## 2022-02-03 DIAGNOSIS — Z98.890: ICD-10-CM

## 2022-02-03 LAB
ALBUMIN SERPL-MCNC: 3.5 G/DL (ref 3.4–5)
ALBUMIN/GLOB SERPL: 0.7 {RATIO} (ref 1–1.7)
ALP SERPL-CCNC: 84 U/L (ref 46–116)
ALT SERPL-CCNC: 17 U/L (ref 16–63)
ANION GAP SERPL CALC-SCNC: 6 MMOL/L (ref 6–14)
APTT PPP: (no result) S
AST SERPL-CCNC: 16 U/L (ref 15–37)
BACTERIA #/AREA URNS HPF: (no result) /HPF
BASOPHILS # BLD AUTO: 0 X10^3/UL (ref 0–0.2)
BASOPHILS NFR BLD: 0 % (ref 0–3)
BILIRUB SERPL-MCNC: 0.6 MG/DL (ref 0.2–1)
BILIRUB UR QL STRIP: (no result)
BUN SERPL-MCNC: 14 MG/DL (ref 8–26)
BUN/CREAT SERPL: 14 (ref 6–20)
CALCIUM SERPL-MCNC: 8.6 MG/DL (ref 8.5–10.1)
CHLORIDE SERPL-SCNC: 103 MMOL/L (ref 98–107)
CO2 SERPL-SCNC: 27 MMOL/L (ref 21–32)
CREAT SERPL-MCNC: 1 MG/DL (ref 0.7–1.3)
EOSINOPHIL NFR BLD: 0.1 X10^3/UL (ref 0–0.7)
EOSINOPHIL NFR BLD: 1 % (ref 0–3)
ERYTHROCYTE [DISTWIDTH] IN BLOOD BY AUTOMATED COUNT: 13.3 % (ref 11.5–14.5)
FIBRINOGEN PPP-MCNC: (no result) MG/DL
GFR SERPLBLD BASED ON 1.73 SQ M-ARVRAT: 91 ML/MIN
GLUCOSE SERPL-MCNC: 98 MG/DL (ref 70–99)
HCT VFR BLD CALC: 44.2 % (ref 39–53)
HGB BLD-MCNC: 15.2 G/DL (ref 13–17.5)
LIPASE: 106 U/L (ref 73–393)
LYMPHOCYTES # BLD: 0.7 X10^3/UL (ref 1–4.8)
LYMPHOCYTES NFR BLD AUTO: 7 % (ref 24–48)
MCH RBC QN AUTO: 31 PG (ref 25–35)
MCHC RBC AUTO-ENTMCNC: 34 G/DL (ref 31–37)
MCV RBC AUTO: 92 FL (ref 79–100)
MONO #: 0.6 X10^3/UL (ref 0–1.1)
MONOCYTES NFR BLD: 6 % (ref 0–9)
NEUT #: 9 X10^3/UL (ref 1.8–7.7)
NEUTROPHILS NFR BLD AUTO: 86 % (ref 31–73)
NITRITE UR QL STRIP: NEGATIVE
PH UR STRIP: 6 [PH]
PLATELET # BLD AUTO: 287 X10^3/UL (ref 140–400)
POTASSIUM SERPL-SCNC: 3.6 MMOL/L (ref 3.5–5.1)
PROT SERPL-MCNC: 8.4 G/DL (ref 6.4–8.2)
PROT UR STRIP-MCNC: 100 MG/DL
RBC # BLD AUTO: 4.83 X10^6/UL (ref 4.3–5.7)
RBC #/AREA URNS HPF: (no result) /HPF (ref 0–2)
SODIUM SERPL-SCNC: 136 MMOL/L (ref 136–145)
UROBILINOGEN UR-MCNC: 1 MG/DL
WBC # BLD AUTO: 10.4 X10^3/UL (ref 4–11)
WBC #/AREA URNS HPF: (no result) /HPF (ref 0–4)

## 2022-02-03 PROCEDURE — 74177 CT ABD & PELVIS W/CONTRAST: CPT

## 2022-02-03 PROCEDURE — 87077 CULTURE AEROBIC IDENTIFY: CPT

## 2022-02-03 PROCEDURE — 80053 COMPREHEN METABOLIC PANEL: CPT

## 2022-02-03 PROCEDURE — 87040 BLOOD CULTURE FOR BACTERIA: CPT

## 2022-02-03 PROCEDURE — 96375 TX/PRO/DX INJ NEW DRUG ADDON: CPT

## 2022-02-03 PROCEDURE — 96374 THER/PROPH/DIAG INJ IV PUSH: CPT

## 2022-02-03 PROCEDURE — 87186 SC STD MICRODIL/AGAR DIL: CPT

## 2022-02-03 PROCEDURE — 96361 HYDRATE IV INFUSION ADD-ON: CPT

## 2022-02-03 PROCEDURE — 83605 ASSAY OF LACTIC ACID: CPT

## 2022-02-03 PROCEDURE — 36415 COLL VENOUS BLD VENIPUNCTURE: CPT

## 2022-02-03 PROCEDURE — 87086 URINE CULTURE/COLONY COUNT: CPT

## 2022-02-03 PROCEDURE — 83690 ASSAY OF LIPASE: CPT

## 2022-02-03 PROCEDURE — 81001 URINALYSIS AUTO W/SCOPE: CPT

## 2022-02-03 PROCEDURE — 85025 COMPLETE CBC W/AUTO DIFF WBC: CPT

## 2022-02-03 PROCEDURE — 99285 EMERGENCY DEPT VISIT HI MDM: CPT

## 2022-02-03 NOTE — RAD
CT abdomen pelvis



HISTORY: Recurrent UTI, abdominal pelvic pain



CT abdomen pelvis was done using 75 mL Isovue-370 contrast. Comparison is made with a study from Dece
mber 26, 2021. There is a trace of pleural effusion on each side with mild atelectasis in the lung ba
ses. A liver lesion is not identified. Spleen is unremarkable. Adrenal glands are normal. Pancreatic 
lesion is not identified. Pancreatic duct upper normal in size. There is no mass or hydronephrosis in
 the kidneys. There are small bilateral renal cysts. There is no bowel obstruction. Appendix is jacquie
l. There are artifacts off the hip prosthesis on each side. There is mild diverticulosis without dive
rticulitis. Bladder is mainly obscured by the artifacts. Patient's had previous lower lumbar laminect
yogesh and fusion.



IMPRESSION:

1. Trace of pleural effusion on each side with mild atelectasis in the lung bases.

2. No bowel obstruction.

3. Normal appendix.

4. Bladder mainly obscured by artifact.

5. No other acute finding noted.













PQRS Compliance Statement:



One or more of the following individualized dose reduction techniques were utilized for this examinat
ion:  

1. Automated exposure control  

2. Adjustment of the mA and/or kV according to patient size  

3. Use of iterative reconstruction technique



Electronically signed by: Isaias Rodríguez MD (2/3/2022 11:38 AM) [id number]

## 2022-02-03 NOTE — PHYS DOC
Past Medical History


Past Medical History:  Other


Additional Past Medical Histor:  CHRONIC PAIN, RLS


Past Surgical History:  Hip Replacement


Additional Past Surgical Histo:  BACK, HERNIA, bilateral hip replacement


Smoking Status:  Current Some Day Smoker


Alcohol Use:  None


Drug Use:  None





General Adult


EDM:


Chief Complaint:  BLOOD IN URINE





HPI:


HPI:





Patient is a 66  year old male who presents with pelvic/suprapubic abdominal 

pain, gross hematuria, dysuria, urinary urgency.  He reports that symptoms began

yesterday but worsened this morning.  He was seen here in December and was 

diagnosed with a urinary tract infection.  CT imaging revealed possibility of 

pyelonephritis.  His urine culture was reviewed by me today, he had pan 

sensitivity to all antibiotics, and he grew E. coli.  He reports that after he 

took the full course of antibiotics, he felt much better.  He was prescribed a 

14-day course.  He followed up subsequently with his primary care doctor after 

this as well.  He denies vomiting but reports mild nausea.  He denies 

constipation or diarrhea.  He denies bleeding from other sources or sites.  He 

is not taking any anticoagulant medications.  He denies any pelvic or abdominal 

trauma.  He denies genital, scrotal or testicle trauma or injury.  He is able to

pass urine, and he feels he is fully evacuating his bladder.  His knowledge he 

has no history of prostate problems.





Review of Systems:


Review of Systems:


Constitutional:   Denies fever or chills. []


Respiratory:   Denies cough or shortness of breath. [] 


Cardiovascular:   Denies chest pain or edema. [] 


GI:   Pelvics and suprapubic abdominal pain.  Nausea, no vomiting.  No diarrhea 

or constipation.  No reported hematochezia or melena.


: Scrotal pain, urethral pain, dysuria, gross hematuria, urinary urgency and 

frequency


Musculoskeletal:   Denies back pain or joint pain. [] 


Integument:   Denies rash.  No genital lesions reported.


Neurologic:   Denies headache, focal weakness or sensory changes. [] 


Psychiatric: Anxiety as it pertains to current clinical condition.





Heart Score:


C/O Chest Pain:  No


Risk Factors:


Risk Factors:  DM, Current or recent (<one month) smoker, HTN, HLP, family h

istory of CAD, obesity.


Risk Scores:


Score 0 - 3:  2.5% MACE over next 6 weeks - Discharge Home


Score 4 - 6:  20.3% MACE over next 6 weeks - Admit for Clinical Observation


Score 7 - 10:  72.7% MACE over next 6 weeks - Early Invasive Strategies





Allergies:


Allergies:





Allergies








Coded Allergies Type Severity Reaction Last Updated Verified


 


  No Known Drug Allergies    21 No











Physical Exam:


PE:





Constitutional: Well developed, well nourished, no acute distress, non-toxic 

appearance. []


HENT: Normocephalic, atraumatic


Eyes: Conjunctiva normal, no discharge. [] 


Neck: Normal range of motion, no tenderness, supple, no stridor. [] 


Cardiovascular:Heart rate regular rhythm, +2 radial and +2 posterior tibial 

pulses bilaterally.  No peripheral edema.  No cyanosis.


Lungs & Thorax:  Bilateral breath sounds clear to auscultation []


Abdomen: Abdomen is soft, nondistended, suprapubic tenderness to palpation, 

bilateral lower quadrant tenderness to palpation.  Voluntary guarding noted.  No

 rebound tenderness.  No palpable pulsatile mass.  No audible bruit.  Normal 

bowel sounds noted.  No CVA tenderness.  No flank or abdominal ecchymoses are 

noted.  No palpable organomegaly.  No fluid wave or ascites noted.  No evidence 

of abdominal trauma noted.


: Testes are descended, bilateral scrotal tenderness, no edema, no erythema, 

no open wounds, no crepitus or subcutaneous emphysema, no evidence of perineal, 

scrotal skin cellulitis, warmth or erythema noted.  Penile urethral meatus is 

patent.  No significant blood at the meatus, though there is residual blood from

 most recent passed urine near the glans.  No ulcers, no vesicles no abnormal 

skin lesions of the genital area.  


Skin: Warm, dry, no erythema, no rash. [] 


Back: No tenderness, no CVA tenderness. [] 


Extremities: No tenderness, no cyanosis, no clubbing, ROM intact, no edema.  No 

calf tenderness


Neurologic: Alert and oriented X 3, normal motor function, normal sensory 

function, no focal deficits noted. []


Psychologic: Anxious, overall cooperative.





EKG:


EKG:


[]





Radiology/Procedures:


Radiology/Procedures:


                                 IMAGING REPORT





                                     Signed





PATIENT: OSMIN BARRAZA ACCOUNT: [id number]     MRN#: [id number]


: 1955           LOCATION: ER              AGE: 66


SEX: M                    EXAM DT: 22         ACCESSION#: [id number]


STATUS: REG ER            ORD. PHYSICIAN: GRACIA DE LA CRUZ DO


REASON: recurrent UTI, abdominal/pelvic pain


PROCEDURE: CT ABD PELV W/ IV CONTRST ONLY





CT abdomen pelvis





HISTORY: Recurrent UTI, abdominal pelvic pain





CT abdomen pelvis was done using 75 mL Isovue-370 contrast. Comparison is made 

with a study from 2021. There is a trace of pleural effusion on 

each side with mild atelectasis in the lung bases. A liver lesion is not 

identified. Spleen is unremarkable. Adrenal glands are normal. Pancreatic lesion

 is not identified. Pancreatic duct upper normal in size. There is no mass or 

hydronephrosis in the kidneys. There are small bilateral renal cysts. There is 

no bowel obstruction. Appendix is normal. There are artifacts off the hip prost

hesis on each side. There is mild diverticulosis without diverticulitis. Bladder

 is mainly obscured by the artifacts. Patient's had previous lower lumbar 

laminectomy and fusion.





IMPRESSION:


1. Trace of pleural effusion on each side with mild atelectasis in the lung 

bases.


2. No bowel obstruction.


3. Normal appendix.


4. Bladder mainly obscured by artifact.


5. No other acute finding noted.




















PQRS Compliance Statement:





One or more of the following individualized dose reduction techniques were 

utilized for this examination:  


1. Automated exposure control  


2. Adjustment of the mA and/or kV according to patient size  


3. Use of iterative reconstruction technique





Electronically signed by: Isaias Rodríguez MD (2/3/2022 11:38 AM) [id number]














DICTATED and SIGNED BY:     ISAIAS RODRÍGUEZ MD


DATE:     22 [id number] 0





Course & Med Decision Making:


Course & Med Decision Making


Pertinent Labs and Imaging studies reviewed. (See chart for details)





The patient is given IV fluids, IV morphine, IV Dilaudid, IV Zofran as well as a

 dose of IV Rocephin.  The patient is resting comfortably.  He has a benign, 

nonsurgical abdominal exam.  He is nontoxic, afebrile, manifest no evidence of 

significant distress.  He reports feeling much better.  I reviewed his previous 

records, previous cultures.  He will be discharged on a cephalosporin, as needed

 analgesia for dysuria, as needed pain medication and antiemetics.  I have given

 him information for outpatient urology follow-up, and I recommend he contact 

his PCP this week to arrange for close follow-up as well.  He may very well 

require outpatient cystoscopy to ensure absence of any underlying pathology such

 as bladder malignancy.  Strict return precautions are given.  He verbalizes 

understanding.





Dragon Disclaimer:


Dragon Disclaimer:


This electronic medical record was generated, in whole or in part, using a voice

 recognition dictation system.





Departure


Departure


Impression:  


   Primary Impression:  


   Urinary tract infection


   Qualified Codes:  N30.01 - Acute cystitis with hematuria


   Additional Impression:  


   Gross hematuria


Disposition:   HOME / SELF CARE / HOMELESS


Condition:  STABLE


Referrals:  


SHAINA HAYES MD (PCP)








GIGI HUTTON MD


Patient Instructions:  Hematuria, Adult, Urinary Tract Infection





Additional Instructions:  


Please take the full course of antibiotics.  Use the pain medicine as needed/as 

directed.  Make sure you stay hydrated, drink plenty of fluids.  Return to the 

ER for more severe pain, temperature 100.4 or higher, uncontrolled vomiting, 

dehydration or for any other concerns.  You do appear to have a urinary tract 

infection which appears to be most consistent with an bladder infection.  Please

 make sure you follow-up with not only her primary care doctor but also with 

outpatient urology, as you may require cystoscopy or a scope to look more 

directly at your bladder to make sure there is not more significant problems, 

such as possible bladder cancer or prostate problems.  Contact your doctor and 

urology this week to arrange for outpatient follow-up.


Scripts


Cefpodoxime Proxetil (CEFPODOXIME PROXETIL) 200 Mg Tablet


1 TAB PO BID for 14 Days, #28 TAB


   Prov: GRACIA DE LA CRUZ DO         2/3/22 


Phenazopyridine Hcl (PYRIDIUM) 200 Mg Tablet


1 TAB PO TID for urinary discomfort for 3 Days, #9 TAB 0 Refills


   take with food


   Prov: GRACIA DE LA CRUZ DO         2/3/22 


Ondansetron Hcl (ONDANSETRON HCL) 4 Mg Tablet


1 TAB PO PRN Q6HRS for vomiting, #20 TAB 1 Refill


   Prov: GRACIA DE LA CRUZ DO         2/3/22 


Hydrocodone Bit/Acetaminophen (HYDROCODONE-APAP 5-325  **) 1 Tab Tablet


1 TAB PO PRN Q6HRS PRN for PAIN, #20 TAB 0 Refills


   Prov: GRACIA DE LA CRUZ DO         2/3/22











GRACIA DE LA CRUZ DO              Feb 3, 2022 09:09

## 2022-02-05 ENCOUNTER — HOSPITAL ENCOUNTER (EMERGENCY)
Dept: HOSPITAL 61 - ER | Age: 67
Discharge: HOME | End: 2022-02-05
Payer: COMMERCIAL

## 2022-02-05 VITALS — DIASTOLIC BLOOD PRESSURE: 70 MMHG | SYSTOLIC BLOOD PRESSURE: 149 MMHG

## 2022-02-05 VITALS — WEIGHT: 178.35 LBS | BODY MASS INDEX: 27.99 KG/M2 | HEIGHT: 67 IN

## 2022-02-05 DIAGNOSIS — G89.29: ICD-10-CM

## 2022-02-05 DIAGNOSIS — F17.200: ICD-10-CM

## 2022-02-05 DIAGNOSIS — N39.0: Primary | ICD-10-CM

## 2022-02-05 DIAGNOSIS — Z96.643: ICD-10-CM

## 2022-02-05 DIAGNOSIS — N45.3: ICD-10-CM

## 2022-02-05 LAB
ANION GAP SERPL CALC-SCNC: 7 MMOL/L (ref 6–14)
APTT PPP: (no result) S
BACTERIA #/AREA URNS HPF: (no result) /HPF
BASOPHILS # BLD AUTO: 0 X10^3/UL (ref 0–0.2)
BASOPHILS NFR BLD: 0 % (ref 0–3)
BILIRUB UR QL STRIP: (no result)
BUN SERPL-MCNC: 9 MG/DL (ref 8–26)
CALCIUM SERPL-MCNC: 7.9 MG/DL (ref 8.5–10.1)
CHLORIDE SERPL-SCNC: 105 MMOL/L (ref 98–107)
CO2 SERPL-SCNC: 28 MMOL/L (ref 21–32)
CREAT SERPL-MCNC: 0.8 MG/DL (ref 0.7–1.3)
EOSINOPHIL NFR BLD: 0.2 X10^3/UL (ref 0–0.7)
EOSINOPHIL NFR BLD: 2 % (ref 0–3)
ERYTHROCYTE [DISTWIDTH] IN BLOOD BY AUTOMATED COUNT: 13.3 % (ref 11.5–14.5)
FIBRINOGEN PPP-MCNC: (no result) MG/DL
GFR SERPLBLD BASED ON 1.73 SQ M-ARVRAT: 117.8 ML/MIN
GLUCOSE SERPL-MCNC: 108 MG/DL (ref 70–99)
HCT VFR BLD CALC: 39.5 % (ref 39–53)
HGB BLD-MCNC: 12.9 G/DL (ref 13–17.5)
LYMPHOCYTES # BLD: 1.2 X10^3/UL (ref 1–4.8)
LYMPHOCYTES NFR BLD AUTO: 11 % (ref 24–48)
MCH RBC QN AUTO: 30 PG (ref 25–35)
MCHC RBC AUTO-ENTMCNC: 33 G/DL (ref 31–37)
MCV RBC AUTO: 92 FL (ref 79–100)
MONO #: 0.8 X10^3/UL (ref 0–1.1)
MONOCYTES NFR BLD: 7 % (ref 0–9)
NEUT #: 9 X10^3/UL (ref 1.8–7.7)
NEUTROPHILS NFR BLD AUTO: 80 % (ref 31–73)
NITRITE UR QL STRIP: (no result)
PH UR STRIP: (no result) [PH]
PLATELET # BLD AUTO: 293 X10^3/UL (ref 140–400)
POTASSIUM SERPL-SCNC: 3.1 MMOL/L (ref 3.5–5.1)
PROT UR STRIP-MCNC: (no result) MG/DL
RBC # BLD AUTO: 4.31 X10^6/UL (ref 4.3–5.7)
RBC #/AREA URNS HPF: >40 /HPF (ref 0–2)
SODIUM SERPL-SCNC: 140 MMOL/L (ref 136–145)
UROBILINOGEN UR-MCNC: (no result) MG/DL
WBC # BLD AUTO: 11.2 X10^3/UL (ref 4–11)
WBC #/AREA URNS HPF: >40 /HPF (ref 0–4)

## 2022-02-05 PROCEDURE — 36415 COLL VENOUS BLD VENIPUNCTURE: CPT

## 2022-02-05 PROCEDURE — 81001 URINALYSIS AUTO W/SCOPE: CPT

## 2022-02-05 PROCEDURE — 80048 BASIC METABOLIC PNL TOTAL CA: CPT

## 2022-02-05 PROCEDURE — 76870 US EXAM SCROTUM: CPT

## 2022-02-05 PROCEDURE — 85025 COMPLETE CBC W/AUTO DIFF WBC: CPT

## 2022-02-05 NOTE — RAD
CLINICAL HISTORY: Reason: L testicular pain, swelling / Spl. Instructions:  / History: 



COMPARISON: CT 2/3/2022



TECHNIQUE:  Ultrasound images of the scrotum was performed with gray-scale and color doppler.



FINDINGS:

Right testicle measures 3.7 x 3.1 x 2.0 cm.



Left testicle measures 3.2 x 2.9 x 2.1 cm.



 There is no intratesticular abnormality. Mild increased vascularity in the left testicle.



Enlargement of the left epididymis which has increased vascularity.



There is no hydrocele or varicocele.



IMPRESSION: 

Findings of left epididymoorchitis



Electronically signed by: Amelia El MD (2/5/2022 6:05 PM) [facility]

## 2022-02-05 NOTE — PHYS DOC
Past Medical History


Past Medical History:  Other


Additional Past Medical Histor:  CHRONIC PAIN, RLS


Past Surgical History:  Hip Replacement


Additional Past Surgical Histo:  BACK, HERNIA, bilateral hip replacement


Smoking Status:  Current Some Day Smoker


Alcohol Use:  None


Drug Use:  None





General Adult


EDM:


Chief Complaint:  MALE UROGENITAL PROBLEMS





HPI:


HPI:





Patient is a 66  year old male with history of recent ED visit on 2/3 diagnosed 

with urinary tract infection started on Cefpodoxime who presents with left-sided

testicular pain.


Symptoms on 2/3 included dysuria, urgency, hematuria, and suprapubic discomfort.

 States that his left testicle has become increasingly more uncomfortable since.


Denies fever/chills. Urine has been bloody to orange in appearance, but has been

taking pyridium.


Urine culture on 2/3 showed pansensitive E. coli.





Review of Systems:


Review of Systems:


Constitutional:   Denies fever or chills. []


Eyes:   Denies change in visual acuity. []


HENT:   Denies nasal congestion or sore throat. [] 


Respiratory:   Denies cough or shortness of breath. [] 


Cardiovascular:   Denies chest pain or edema. [] 


GI:   Denies abdominal pain, nausea, vomiting, bloody stools or diarrhea. [] 


: Reports dysuria, hematuria, left testicular pain.


Musculoskeletal:   Denies back pain or joint pain. [] 


Integument:   Denies rash. [] 


Neurologic:   Denies headache, focal weakness or sensory changes. [] 


Psychiatric:  Denies depression or anxiety. []





Heart Score:


C/O Chest Pain:  No





Current Medications:





Current Medications








 Medications


  (Trade)  Dose


 Ordered  Sig/[facility]  Start Time


 Stop Time Status Last Admin


Dose Admin


 


 Oxycodone HCl


  (Roxicodone)  5 mg  1X  ONCE  22 16:45


 22 16:46 DC 22 16:48


5 MG











Allergies:


Allergies:





Allergies








Coded Allergies Type Severity Reaction Last Updated Verified


 


  No Known Drug Allergies    21 No











Physical Exam:


PE:





Constitutional: Well developed, well nourished, no acute distress, non-toxic 

appearance. []


HENT: Normocephalic, atraumatic, bilateral external ears normal, oropharynx 

moist, no oral exudates, nose normal. []


Eyes: PERRLA, EOMI, conjunctiva normal, no discharge. [] 


Neck: Normal range of motion, no tenderness, supple, no stridor. [] 


Cardiovascular:Heart rate regular rhythm, no murmur []


Lungs & Thorax:  Bilateral breath sounds clear to auscultation []


Abdomen: Bowel sounds normal, soft, no tenderness, no masses, no pulsatile 

masses. [] 


: Left testicle is firm and tender throughout with evidence of left epididymal

 tenderness as well. No R sided testicular tenderness. 


Extremities: No tenderness, no cyanosis, no clubbing, ROM intact, no edema. [] 


Neurologic: Alert and oriented X 3, normal motor function, normal sensory 

function, no focal deficits noted. []


Psychologic: Affect normal, judgement normal, mood normal. []





Current Patient Data:


Labs:





Laboratory Tests








Test


 22


16:35 22


16:53


 


Urine Collection Type Unknown  


 


Urine Color Orange  


 


Urine Clarity Cloudy  


 


Urine pH   


 


Urine Specific Gravity   


 


Urine Protein  mg/dL  


 


Urine Glucose (UA)  mg/dL  


 


Urine Ketones (Stick)  mg/dL  


 


Urine Blood   


 


Urine Nitrite   


 


Urine Bilirubin   


 


Urine Urobilinogen Dipstick  mg/dL  


 


Urine Leukocyte Esterase   


 


Urine RBC >40 /HPF  


 


Urine WBC >40 /HPF  


 


Urine Bacteria Few /HPF  


 


Urine Mucus Mod /LPF  


 


White Blood Count  11.2 x10^3/uL 


 


Red Blood Count  4.31 x10^6/uL 


 


Hemoglobin  12.9 g/dL 


 


Hematocrit  39.5 % 


 


Mean Corpuscular Volume  92 fL 


 


Mean Corpuscular Hemoglobin  30 pg 


 


Mean Corpuscular Hemoglobin


Concent 


 33 g/dL 





 


Red Cell Distribution Width  13.3 % 


 


Platelet Count  293 x10^3/uL 


 


Neutrophils (%) (Auto)  80 % 


 


Lymphocytes (%) (Auto)  11 % 


 


Monocytes (%) (Auto)  7 % 


 


Eosinophils (%) (Auto)  2 % 


 


Basophils (%) (Auto)  0 % 


 


Neutrophils # (Auto)  9.0 x10^3/uL 


 


Lymphocytes # (Auto)  1.2 x10^3/uL 


 


Monocytes # (Auto)  0.8 x10^3/uL 


 


Eosinophils # (Auto)  0.2 x10^3/uL 


 


Basophils # (Auto)  0.0 x10^3/uL 


 


Sodium Level  140 mmol/L 


 


Potassium Level  3.1 mmol/L 


 


Chloride Level  105 mmol/L 


 


Carbon Dioxide Level  28 mmol/L 


 


Anion Gap  7 


 


Blood Urea Nitrogen  9 mg/dL 


 


Creatinine  0.8 mg/dL 


 


Estimated GFR


(Cockcroft-Gault) 


 117.8 





 


Glucose Level  108 mg/dL 


 


Calcium Level  7.9 mg/dL 








Current Medications








 Medications


  (Trade)  Dose


 Ordered  Sig/[facility]


 Route


 PRN Reason  Start Time


 Stop Time Status Last Admin


Dose Admin


 


 Oxycodone HCl


  (Roxicodone)  5 mg  1X  ONCE


 PO


   22 16:45


 22 16:46 DC 22 16:48











Vital Signs:


v


Vital Signs








  Date Time  Temp Pulse Resp B/P (MAP) Pulse Ox O2 Delivery O2 Flow Rate FiO2


 


22 16:30 98.4 59 16 167/77 (107) 97 Room Air  





 98.4       








Vital Signs








  Date Time  Temp Pulse Resp B/P (MAP) Pulse Ox O2 Delivery O2 Flow Rate FiO2


 


22 16:48   16   Room Air  


 


22 16:30 98.4 59  167/77 (107) 97   





 98.4       











EKG:


EKG:


[]





Radiology/Procedures:


Radiology/Procedures:


[]


Impression:


                            [facility]


                    [address]  [place], [place] [id number] [phone]


                                        


                                 IMAGING REPORT





                                     Signed





PATIENT: OSMIN BARRAZA ACCOUNT: [id number]     MRN#: [id number]


: 1955           LOCATION: ER              AGE: 66


SEX: M                    EXAM DT: 22         ACCESSION#: [id number]


STATUS: REG ER            ORD. PHYSICIAN: GOLD MADDOX MD


REASON: L testicular pain, swelling


PROCEDURE: TESTICULAR/SCROTUM





CLINICAL HISTORY: Reason: L testicular pain, swelling / Spl. Instructions:  / 

History: 





COMPARISON: CT 2/3/2022





TECHNIQUE:  Ultrasound images of the scrotum was performed with gray-scale and 

color doppler.





FINDINGS:


Right testicle measures 3.7 x 3.1 x 2.0 cm.





Left testicle measures 3.2 x 2.9 x 2.1 cm.





 There is no intratesticular abnormality. Mild increased vascularity in the left

 testicle.





Enlargement of the left epididymis which has increased vascularity.





There is no hydrocele or varicocele.





IMPRESSION: 


Findings of left epididymoorchitis





Electronically signed by: Amelia Schwarz MD (2022 6:05 PM) [facility]














DICTATED and SIGNED BY:     AMELIA SCHWARZ MD


DATE:     22 [id number] 0





Course & Med Decision Making:


Course & Med Decision Making


Pertinent Labs and Imaging studies reviewed. (See chart for details)





Patient is 66-year-old male seen on 2/3 and diagnosed with a pansensitive E. 

coli urinary tract infection and discharged on Cefpodoxime who presents with 

increasing left testicular discomfort.


Concern for orchititis/epidymitis, scrotal US ordered.  No e/o of skin changes 

to suggest fourniers/nec fasc.


Scrotal US shows signs of epididymoorchitis without abscess.


Antibiotics will be changed to levofloxacin.  Provided with urology follow-up.





Dragon Disclaimer:


Dragon Disclaimer:


This electronic medical record was generated, in whole or in part, using a voice

 recognition dictation system.





Departure


Departure


Impression:  


   Primary Impression:  


   UTI (urinary tract infection)


   Additional Impressions:  


   Left testicular pain


   Epididymo-orchitis


Disposition:   HOME / SELF CARE / HOMELESS


Condition:  STABLE


Referrals:  


SHAINA HAYES MD (PCP)








RIVAS HUFF DO


Schedule an appointment with the urologist for follow-up.


Patient Instructions:  Epididymitis, Orchitis





Additional Instructions:  


Please switch antibiotics to levofloxacin 750 mg daily for 14 days. Do not 

continue to take your cefpodoxime. 


I am concerned that the infection has moved to around your testicle.


Please call the number previously provided for urology follow-up.  You can see 

this number again is attached for Dr. Huff.


You develop high fevers,, or other new/concerning symptoms please return to the 

emergency department for reevaluation.


Scripts


Levofloxacin (LEVOFLOXACIN) 750 Mg Tablet


1 TAB PO DAILY, #14 TAB 0 Refills


   Prov: GOLD MADDOX MD         22











GOLD MADDOX MD               2022 16:56

## 2022-05-30 ENCOUNTER — HOSPITAL ENCOUNTER (EMERGENCY)
Dept: HOSPITAL 61 - ER | Age: 67
Discharge: HOME | End: 2022-05-30
Payer: COMMERCIAL

## 2022-05-30 VITALS — WEIGHT: 175.27 LBS | BODY MASS INDEX: 23.74 KG/M2 | HEIGHT: 72 IN

## 2022-05-30 VITALS — DIASTOLIC BLOOD PRESSURE: 61 MMHG | SYSTOLIC BLOOD PRESSURE: 125 MMHG

## 2022-05-30 DIAGNOSIS — K57.32: Primary | ICD-10-CM

## 2022-05-30 DIAGNOSIS — G89.29: ICD-10-CM

## 2022-05-30 DIAGNOSIS — F17.200: ICD-10-CM

## 2022-05-30 DIAGNOSIS — R07.89: ICD-10-CM

## 2022-05-30 LAB
ALBUMIN SERPL-MCNC: 3.5 G/DL (ref 3.4–5)
ALBUMIN/GLOB SERPL: 1 {RATIO} (ref 1–1.7)
ALP SERPL-CCNC: 72 U/L (ref 46–116)
ALT SERPL-CCNC: 32 U/L (ref 16–63)
ANION GAP SERPL CALC-SCNC: 12 MMOL/L (ref 6–14)
AST SERPL-CCNC: 18 U/L (ref 15–37)
BASOPHILS # BLD AUTO: 0.1 X10^3/UL (ref 0–0.2)
BASOPHILS NFR BLD: 1 % (ref 0–3)
BILIRUB SERPL-MCNC: 0.6 MG/DL (ref 0.2–1)
BUN SERPL-MCNC: 12 MG/DL (ref 8–26)
BUN/CREAT SERPL: 12 (ref 6–20)
CALCIUM SERPL-MCNC: 8.6 MG/DL (ref 8.5–10.1)
CHLORIDE SERPL-SCNC: 107 MMOL/L (ref 98–107)
CO2 SERPL-SCNC: 24 MMOL/L (ref 21–32)
CREAT SERPL-MCNC: 1 MG/DL (ref 0.7–1.3)
EOSINOPHIL NFR BLD: 0.2 X10^3/UL (ref 0–0.7)
EOSINOPHIL NFR BLD: 3 % (ref 0–3)
ERYTHROCYTE [DISTWIDTH] IN BLOOD BY AUTOMATED COUNT: 14.4 % (ref 11.5–14.5)
GFR SERPLBLD BASED ON 1.73 SQ M-ARVRAT: 91 ML/MIN
GLUCOSE SERPL-MCNC: 130 MG/DL (ref 70–99)
HCT VFR BLD CALC: 43.9 % (ref 39–53)
HGB BLD-MCNC: 14.9 G/DL (ref 13–17.5)
LIPASE: 88 U/L (ref 73–393)
LYMPHOCYTES # BLD: 1 X10^3/UL (ref 1–4.8)
LYMPHOCYTES NFR BLD AUTO: 12 % (ref 24–48)
MCH RBC QN AUTO: 32 PG (ref 25–35)
MCHC RBC AUTO-ENTMCNC: 34 G/DL (ref 31–37)
MCV RBC AUTO: 93 FL (ref 79–100)
MONO #: 0.6 X10^3/UL (ref 0–1.1)
MONOCYTES NFR BLD: 8 % (ref 0–9)
NEUT #: 6.3 X10^3/UL (ref 1.8–7.7)
NEUTROPHILS NFR BLD AUTO: 77 % (ref 31–73)
PLATELET # BLD AUTO: 177 X10^3/UL (ref 140–400)
POTASSIUM SERPL-SCNC: 3.4 MMOL/L (ref 3.5–5.1)
PROT SERPL-MCNC: 6.9 G/DL (ref 6.4–8.2)
RBC # BLD AUTO: 4.73 X10^6/UL (ref 4.3–5.7)
SODIUM SERPL-SCNC: 143 MMOL/L (ref 136–145)
WBC # BLD AUTO: 8.2 X10^3/UL (ref 4–11)

## 2022-05-30 PROCEDURE — 96374 THER/PROPH/DIAG INJ IV PUSH: CPT

## 2022-05-30 PROCEDURE — 93005 ELECTROCARDIOGRAM TRACING: CPT

## 2022-05-30 PROCEDURE — 71045 X-RAY EXAM CHEST 1 VIEW: CPT

## 2022-05-30 PROCEDURE — 84484 ASSAY OF TROPONIN QUANT: CPT

## 2022-05-30 PROCEDURE — 99285 EMERGENCY DEPT VISIT HI MDM: CPT

## 2022-05-30 PROCEDURE — 80053 COMPREHEN METABOLIC PANEL: CPT

## 2022-05-30 PROCEDURE — 85025 COMPLETE CBC W/AUTO DIFF WBC: CPT

## 2022-05-30 PROCEDURE — 74176 CT ABD & PELVIS W/O CONTRAST: CPT

## 2022-05-30 PROCEDURE — 83880 ASSAY OF NATRIURETIC PEPTIDE: CPT

## 2022-05-30 PROCEDURE — 83690 ASSAY OF LIPASE: CPT

## 2022-05-30 PROCEDURE — 36415 COLL VENOUS BLD VENIPUNCTURE: CPT

## 2022-05-30 NOTE — RAD
Abdominal and Pelvis CT, Without Contrast:



History:  Reason: LLQ abd pain / Spl. Instructions:  / History: 



Comparison: February 3, 2022.



Procedure: Axial images are obtained of the abdomen and pelvis, without IV or oral contrast.



Oral Contrast:  No



Findings:



Evaluation of solid organs is limited without contrast.



The low pelvis is not well-seen due to beam hardening artifact from prior bilateral total hip arthrop
lasty. There is a trace of free fluid in the pelvis. There is left-sided colonic diverticulosis and t
here is moderate pericolonic inflammation involving the proximal descending colon.



The appendix is normal. The gallbladder is collapsed and not well evaluated but appears normal.



There is beam hardening artifact due to bilateral pedicle screws and posterior fusion rods for fusion
 laminectomy of L3-S1.







Liver: Normal.



Spleen: Normal.



Pancreas: Normal.





Adrenal Glands: Normal.



Kidneys: Normal.



There is no free air.



There is no lymphadenopathy.





The urinary bladder appears normal.





There is no pericolonic inflammation identified.



Impression:





1. Diverticulitis involving the proximal descending colon.

2. Trace of free fluid in the pelvis.



End impression



PQRS Compliance Statement:



One or more of the following individualized dose reduction techniques were utilized for this examinat
ion:  

1. Automated exposure control  

2. Adjustment of the mA and/or kV according to patient size  

3. Use of iterative reconstruction technique



Electronically signed by: Marc Ayala III, MD (5/30/2022 5:40 AM) [facility]

## 2022-05-30 NOTE — PHYS DOC
Past Medical History


Past Medical History:  Other


Additional Past Medical Histor:  CHRONIC PAIN, RLS


Past Surgical History:  Other


Additional Past Surgical Histo:  HERNIA


Smoking Status:  Current Every Day Smoker


Alcohol Use:  Occasionally


Drug Use:  None





General Adult


EDM:


Chief Complaint:  CHEST PAIN





HPI:


HPI:





Patient is a 66  year old male presents with left-sided lower abdominal pain.  

Also states that he began having some chest pain when this pain began.  Chest 

pain is sharp in nature without any radiation.  Denies any history of CAD.  

History of diverticulitis but states that this feels a lot worse than the last 

time he had it.  No fever or chills.  No vomiting or diarrhea.  No melena or 

hematochezia.  No UTI symptoms.  No radiation of the pain to the back.





Review of Systems:


Review of Systems:


Constitutional:   Denies fever or chills. []


Eyes:   Denies change in visual acuity. []


HENT:   Denies nasal congestion or sore throat. [] 


Respiratory:   Denies cough or shortness of breath. [] 


Cardiovascular:   Positive for chest pain


GI:   Positive for abdominal pain


:  Denies dysuria. [] 


Musculoskeletal:   Denies back pain or joint pain. [] 


Integument:   Denies rash. [] 


Neurologic:   Denies headache, focal weakness or sensory changes. [] 


Endocrine:   Denies polyuria or polydipsia. [] 


Lymphatic:  Denies swollen glands. [] 


Psychiatric:  Denies depression or anxiety. []





Heart Score:


C/O Chest Pain:  Yes


HEART Score for Chest Pain:  








HEART Score for Chest Pain Response (Comments) Value


 


History Slighlty/Non-Suspicious 0


 


ECG Normal 0


 


Age >45 - < 65 1


 


Risk Factors                            1 or 2 Risk Factors 1


 


Troponin < Normal Limit 0


 


Total  2








Risk Factors:


Risk Factors:  DM, Current or recent (<one month) smoker, HTN, HLP, family 

history of CAD, obesity.


Risk Scores:


Score 0 - 3:  2.5% MACE over next 6 weeks - Discharge Home


Score 4 - 6:  20.3% MACE over next 6 weeks - Admit for Clinical Observation


Score 7 - 10:  72.7% MACE over next 6 weeks - Early Invasive Strategies





Current Medications:





Current Medications








 Medications


  (Trade)  Dose


 Ordered  Sig/Andrew  Start Time


 Stop Time Status Last Admin


Dose Admin


 


 Amoxicillin/


 Clavulanate


 Potassium


  (Augmentin 875/


 125mg)  1 tab  1X  ONCE  5/30/22 06:30


 5/30/22 06:31   





 


 Aspirin


  (Aspirin


 Chewable)  324 mg  1X  ONCE  5/30/22 04:15


 5/30/22 04:19 DC 5/30/22 04:52


324 MG


 


 Morphine Sulfate


  (Morphine


 Sulfate)  2 mg  1X  ONCE  5/30/22 05:45


 5/30/22 05:50 DC 5/30/22 05:52


2 MG


 


 Nitroglycerin


  (Nitro-Bid Oint)  1 inch  1X  ONCE  5/30/22 04:15


 5/30/22 04:19 DC 5/30/22 04:53


1 INCH











Allergies:


Allergies:





Allergies








Coded Allergies Type Severity Reaction Last Updated Verified


 


  No Known Drug Allergies    2/5/22 No











Physical Exam:


PE:





Constitutional: Well developed, well nourished, no acute distress, non-toxic 

appearance. []


HENT: Normocephalic, atraumatic, bilateral external ears normal, oropharynx 

moist, no oral exudates, nose normal. []


Eyes: PERRLA, EOMI, conjunctiva normal, no discharge. [] 


Neck: Normal range of motion, no tenderness, supple, no stridor. [] 


Cardiovascular:Heart rate regular rhythm, no murmur []


Lungs & Thorax:  Bilateral breath sounds clear to auscultation []


Abdomen: Tenderness in the left upper and left lower quadrant


Skin: Warm, dry, no erythema, no rash. [] 


Back: No tenderness, no CVA tenderness. [] 


Extremities: No tenderness, no cyanosis, no clubbing, ROM intact, no edema. [] 


Neurologic: Alert and oriented X 3, normal motor function, normal sensory 

function, no focal deficits noted. []


Psychologic: Affect normal, judgement normal, mood normal. []





Current Patient Data:


Labs:





                                Laboratory Tests








Test


 5/30/22


04:23


 


White Blood Count


 8.2 x10^3/uL


(4.0-11.0)


 


Red Blood Count


 4.73 x10^6/uL


(4.30-5.70)


 


Hemoglobin


 14.9 g/dL


(13.0-17.5)


 


Hematocrit


 43.9 %


(39.0-53.0)


 


Mean Corpuscular Volume


 93 fL ()





 


Mean Corpuscular Hemoglobin 32 pg (25-35)  


 


Mean Corpuscular Hemoglobin


Concent 34 g/dL


(31-37)


 


Red Cell Distribution Width


 14.4 %


(11.5-14.5)


 


Platelet Count


 177 x10^3/uL


(140-400)


 


Neutrophils (%) (Auto) 77 % (31-73)  H


 


Lymphocytes (%) (Auto) 12 % (24-48)  L


 


Monocytes (%) (Auto) 8 % (0-9)  


 


Eosinophils (%) (Auto) 3 % (0-3)  


 


Basophils (%) (Auto) 1 % (0-3)  


 


Neutrophils # (Auto)


 6.3 x10^3/uL


(1.8-7.7)


 


Lymphocytes # (Auto)


 1.0 x10^3/uL


(1.0-4.8)


 


Monocytes # (Auto)


 0.6 x10^3/uL


(0.0-1.1)


 


Eosinophils # (Auto)


 0.2 x10^3/uL


(0.0-0.7)


 


Basophils # (Auto)


 0.1 x10^3/uL


(0.0-0.2)


 


Sodium Level


 143 mmol/L


(136-145)


 


Potassium Level


 3.4 mmol/L


(3.5-5.1)  L


 


Chloride Level


 107 mmol/L


()


 


Carbon Dioxide Level


 24 mmol/L


(21-32)


 


Anion Gap 12 (6-14)  


 


Blood Urea Nitrogen


 12 mg/dL


(8-26)


 


Creatinine


 1.0 mg/dL


(0.7-1.3)


 


Estimated GFR


(Cockcroft-Gault) 91.0  





 


BUN/Creatinine Ratio 12 (6-20)  


 


Glucose Level


 130 mg/dL


(70-99)  H


 


Calcium Level


 8.6 mg/dL


(8.5-10.1)


 


Total Bilirubin


 0.6 mg/dL


(0.2-1.0)


 


Aspartate Amino Transferase


(AST) 18 U/L (15-37)





 


Alanine Aminotransferase (ALT)


 32 U/L (16-63)





 


Alkaline Phosphatase


 72 U/L


()


 


Troponin I High Sensitivity 8 ng/L (4-75)  


 


NT-Pro-B-Type Natriuretic


Peptide 38 pg/mL


(0-124)


 


Total Protein


 6.9 g/dL


(6.4-8.2)


 


Albumin


 3.5 g/dL


(3.4-5.0)


 


Albumin/Globulin Ratio 1.0 (1.0-1.7)  


 


Lipase


 88 U/L


()





                                Laboratory Tests


5/30/22 04:23








                                Laboratory Tests


5/30/22 04:23








Vital Signs:





                                   Vital Signs








  Date Time  Temp Pulse Resp B/P (MAP) Pulse Ox O2 Delivery O2 Flow Rate FiO2


 


5/30/22 05:52   26  97 Room Air  


 


5/30/22 04:53  68  164/80    


 


5/30/22 03:54 98.8       





 98.8       











EKG:


EKG:


[]





Radiology/Procedures:


Radiology/Procedures:


[]





Course & Med Decision Making:


Course & Med Decision Making


Pertinent Labs and Imaging studies reviewed. (See chart for details)





[]





Dragon Disclaimer:


Dragon Disclaimer:


This electronic medical record was generated, in whole or in part, using a voice

 recognition dictation system.





Departure


Departure


Impression:  


   Primary Impression:  


   Abdominal pain


   Additional Impression:  


   Diverticulitis large intestine


Disposition:  01 HOME / SELF CARE / HOMELESS


Condition:  STABLE


Referrals:  


SHAINA HAYES MD (PCP)


Patient Instructions:  Diverticulitis, Easy-to-Read


Scripts


Amoxicillin/Potassium Clav (AUGMENTIN 875-125 TABLET) 1 Each Tablet


1 TAB PO TID for 5 Days, #15 TAB 0 Refills


   Prov: SHAWNA DRISCOLL MD         5/30/22











SHAWNA DRISCOLL MD                 May 30, 2022 06:35

## 2022-05-30 NOTE — RAD
XR CHEST 1V



Clinical History: Reason: chest pain / Spl. Instructions:  / History: 



Technique:  AP view of the chest was obtained at 5/30/2022 4:19 AM.



Comparison: May 9, 2021.



Findings:

The cardiomediastinal silhouette is normal. The pulmonary vasculature is normal. The lungs and pleura
l margins are clear.



Impression:  



No evidence of an acute cardiopulmonary process.



Electronically signed by: Marc Ayala III, MD (5/30/2022 4:55 AM) [facility]

## 2022-05-31 NOTE — EKG
[facility]

              [address] [place] [address]

Test Date:    2022               Test Time:    04:12:55

Pat Name:     OSMIN BARRAZA           Department:   

Patient ID:   [id number]           Room:          

Gender:       M                        Technician:   

:          [id number]               Requested By: LINDSEY CONNELLY

Order Number: [id number]           Reading MD:   Terrence Cooper MD

                                 Measurements

Intervals                              Axis          

Rate:         74                       P:            5

NY:           176                      QRS:          128

QRSD:         108                      T:            47

QT:           370                                    

QTc:          411                                    

                           Interpretive Statements

SINUS RHYTHM

RBBB

Electronically Signed On 2022 11:00:24 CDT by Terrence Cooper MD